# Patient Record
Sex: FEMALE | Race: BLACK OR AFRICAN AMERICAN | NOT HISPANIC OR LATINO | Employment: UNEMPLOYED | URBAN - METROPOLITAN AREA
[De-identification: names, ages, dates, MRNs, and addresses within clinical notes are randomized per-mention and may not be internally consistent; named-entity substitution may affect disease eponyms.]

---

## 2017-03-07 ENCOUNTER — HISTORICAL (OUTPATIENT)
Dept: FAMILY MEDICINE | Facility: CLINIC | Age: 62
End: 2017-03-07

## 2017-03-28 ENCOUNTER — HISTORICAL (OUTPATIENT)
Dept: FAMILY MEDICINE | Facility: CLINIC | Age: 62
End: 2017-03-28

## 2017-07-27 ENCOUNTER — HISTORICAL (OUTPATIENT)
Dept: ADMINISTRATIVE | Facility: HOSPITAL | Age: 62
End: 2017-07-27

## 2017-07-27 LAB
CHOLEST SERPL-MCNC: 150 MG/DL
CHOLEST/HDLC SERPL: 3.5 {RATIO} (ref 0–4.4)
EST. AVERAGE GLUCOSE BLD GHB EST-MCNC: 200 MG/DL
HBA1C MFR BLD: 8.6 % (ref 4.2–6.3)
HDLC SERPL-MCNC: 43 MG/DL
LDLC SERPL CALC-MCNC: 74 MG/DL (ref 0–130)
TRIGL SERPL-MCNC: 165 MG/DL
VLDLC SERPL CALC-MCNC: 33 MG/DL

## 2017-10-20 ENCOUNTER — HISTORICAL (OUTPATIENT)
Dept: INTERNAL MEDICINE | Facility: CLINIC | Age: 62
End: 2017-10-20

## 2017-10-20 LAB
EST. AVERAGE GLUCOSE BLD GHB EST-MCNC: 206 MG/DL
HBA1C MFR BLD: 8.8 % (ref 4.2–6.3)

## 2018-02-05 ENCOUNTER — HISTORICAL (OUTPATIENT)
Dept: ADMINISTRATIVE | Facility: HOSPITAL | Age: 63
End: 2018-02-05

## 2018-02-05 LAB
ABS NEUT (OLG): 6.19 X10(3)/MCL (ref 2.1–9.2)
ALBUMIN SERPL-MCNC: 4.1 GM/DL (ref 3.4–5)
ALBUMIN/GLOB SERPL: 1 RATIO (ref 1–2)
ALP SERPL-CCNC: 96 UNIT/L (ref 45–117)
ALT SERPL-CCNC: 21 UNIT/L (ref 12–78)
AST SERPL-CCNC: 16 UNIT/L (ref 15–37)
BASOPHILS # BLD AUTO: 0.04 X10(3)/MCL
BASOPHILS NFR BLD AUTO: 0 % (ref 0–1)
BILIRUB SERPL-MCNC: 0.3 MG/DL (ref 0.2–1)
BILIRUBIN DIRECT+TOT PNL SERPL-MCNC: <0.1 MG/DL
BILIRUBIN DIRECT+TOT PNL SERPL-MCNC: ABNORMAL MG/DL
BUN SERPL-MCNC: 40 MG/DL (ref 7–18)
CALCIUM SERPL-MCNC: 9.5 MG/DL (ref 8.5–10.1)
CHLORIDE SERPL-SCNC: 109 MMOL/L (ref 98–107)
CO2 SERPL-SCNC: 24 MMOL/L (ref 21–32)
CREAT SERPL-MCNC: 1.8 MG/DL (ref 0.6–1.3)
EOSINOPHIL # BLD AUTO: 0.28 X10(3)/MCL
EOSINOPHIL NFR BLD AUTO: 3 % (ref 0–5)
ERYTHROCYTE [DISTWIDTH] IN BLOOD BY AUTOMATED COUNT: 14 % (ref 11.5–14.5)
EST. AVERAGE GLUCOSE BLD GHB EST-MCNC: 203 MG/DL
GLOBULIN SER-MCNC: 3.7 GM/ML (ref 2.3–3.5)
GLUCOSE SERPL-MCNC: 201 MG/DL (ref 74–106)
HBA1C MFR BLD: 8.7 % (ref 4.2–6.3)
HCT VFR BLD AUTO: 35.8 % (ref 35–46)
HGB BLD-MCNC: 11.2 GM/DL (ref 12–16)
IMM GRANULOCYTES # BLD AUTO: 0.03 10*3/UL
IMM GRANULOCYTES NFR BLD AUTO: 0 %
LYMPHOCYTES # BLD AUTO: 2.63 X10(3)/MCL
LYMPHOCYTES NFR BLD AUTO: 27 % (ref 15–40)
MCH RBC QN AUTO: 26.2 PG (ref 26–34)
MCHC RBC AUTO-ENTMCNC: 31.3 GM/DL (ref 31–37)
MCV RBC AUTO: 83.6 FL (ref 80–100)
MONOCYTES # BLD AUTO: 0.57 X10(3)/MCL
MONOCYTES NFR BLD AUTO: 6 % (ref 4–12)
NEUTROPHILS # BLD AUTO: 6.19 X10(3)/MCL
NEUTROPHILS NFR BLD AUTO: 64 X10(3)/MCL
PLATELET # BLD AUTO: 253 X10(3)/MCL (ref 130–400)
PMV BLD AUTO: 12 FL (ref 7.4–10.4)
POTASSIUM SERPL-SCNC: 5 MMOL/L (ref 3.5–5.1)
PROT SERPL-MCNC: 7.8 GM/DL (ref 6.4–8.2)
RBC # BLD AUTO: 4.28 X10(6)/MCL (ref 4–5.2)
SODIUM SERPL-SCNC: 141 MMOL/L (ref 136–145)
WBC # SPEC AUTO: 9.7 X10(3)/MCL (ref 4.5–11)

## 2018-02-14 ENCOUNTER — HISTORICAL (OUTPATIENT)
Dept: FAMILY MEDICINE | Facility: CLINIC | Age: 63
End: 2018-02-14

## 2018-03-08 ENCOUNTER — HISTORICAL (OUTPATIENT)
Dept: FAMILY MEDICINE | Facility: CLINIC | Age: 63
End: 2018-03-08

## 2018-03-08 LAB
BUN SERPL-MCNC: 32 MG/DL (ref 7–18)
CALCIUM SERPL-MCNC: 9.2 MG/DL (ref 8.5–10.1)
CHLORIDE SERPL-SCNC: 106 MMOL/L (ref 98–107)
CO2 SERPL-SCNC: 27 MMOL/L (ref 21–32)
CREAT SERPL-MCNC: 1.3 MG/DL (ref 0.6–1.3)
CREAT/UREA NIT SERPL: 25
GLUCOSE SERPL-MCNC: 118 MG/DL (ref 74–106)
POTASSIUM SERPL-SCNC: 4 MMOL/L (ref 3.5–5.1)
SODIUM SERPL-SCNC: 141 MMOL/L (ref 136–145)

## 2018-03-29 ENCOUNTER — HISTORICAL (OUTPATIENT)
Dept: RADIOLOGY | Facility: HOSPITAL | Age: 63
End: 2018-03-29

## 2018-04-17 ENCOUNTER — HISTORICAL (OUTPATIENT)
Dept: FAMILY MEDICINE | Facility: CLINIC | Age: 63
End: 2018-04-17

## 2018-05-01 ENCOUNTER — HISTORICAL (OUTPATIENT)
Dept: ADMINISTRATIVE | Facility: HOSPITAL | Age: 63
End: 2018-05-01

## 2018-05-01 LAB
BUN SERPL-MCNC: 30 MG/DL (ref 7–18)
CALCIUM SERPL-MCNC: 8.8 MG/DL (ref 8.5–10.1)
CHLORIDE SERPL-SCNC: 110 MMOL/L (ref 98–107)
CO2 SERPL-SCNC: 26 MMOL/L (ref 21–32)
CREAT SERPL-MCNC: 1.6 MG/DL (ref 0.6–1.3)
CREAT/UREA NIT SERPL: 19
EST. AVERAGE GLUCOSE BLD GHB EST-MCNC: 214 MG/DL
GLUCOSE SERPL-MCNC: 154 MG/DL (ref 74–106)
HBA1C MFR BLD: 9.1 % (ref 4.2–6.3)
POTASSIUM SERPL-SCNC: 4 MMOL/L (ref 3.5–5.1)
SODIUM SERPL-SCNC: 137 MMOL/L (ref 136–145)

## 2018-07-03 ENCOUNTER — HISTORICAL (OUTPATIENT)
Dept: ADMINISTRATIVE | Facility: HOSPITAL | Age: 63
End: 2018-07-03

## 2018-07-03 LAB
BUN SERPL-MCNC: 33 MG/DL (ref 7–18)
CALCIUM SERPL-MCNC: 9.8 MG/DL (ref 8.5–10.1)
CHLORIDE SERPL-SCNC: 105 MMOL/L (ref 98–107)
CHOLEST SERPL-MCNC: 170 MG/DL
CHOLEST/HDLC SERPL: 4.9 {RATIO} (ref 0–4.4)
CO2 SERPL-SCNC: 27 MMOL/L (ref 21–32)
CREAT SERPL-MCNC: 1.6 MG/DL (ref 0.6–1.3)
CREAT/UREA NIT SERPL: 21
GLUCOSE SERPL-MCNC: 254 MG/DL (ref 74–106)
HDLC SERPL-MCNC: 35 MG/DL
LDLC SERPL CALC-MCNC: 82 MG/DL (ref 0–130)
POTASSIUM SERPL-SCNC: 5.8 MMOL/L (ref 3.5–5.1)
SODIUM SERPL-SCNC: 136 MMOL/L (ref 136–145)
TRIGL SERPL-MCNC: 267 MG/DL
VLDLC SERPL CALC-MCNC: 53 MG/DL

## 2018-09-13 ENCOUNTER — HISTORICAL (OUTPATIENT)
Dept: ADMINISTRATIVE | Facility: HOSPITAL | Age: 63
End: 2018-09-13

## 2018-09-13 LAB
ABS NEUT (OLG): 4.99 X10(3)/MCL (ref 2.1–9.2)
ALBUMIN SERPL-MCNC: 3.9 GM/DL (ref 3.4–5)
ALBUMIN/GLOB SERPL: 1 RATIO (ref 1–2)
ALP SERPL-CCNC: 118 UNIT/L (ref 45–117)
ALT SERPL-CCNC: 23 UNIT/L (ref 12–78)
APPEARANCE, UA: CLEAR
AST SERPL-CCNC: 33 UNIT/L (ref 15–37)
BACTERIA #/AREA URNS AUTO: ABNORMAL /[HPF]
BASOPHILS # BLD AUTO: 0.03 X10(3)/MCL
BASOPHILS NFR BLD AUTO: 0 %
BILIRUB SERPL-MCNC: 0.2 MG/DL (ref 0.2–1)
BILIRUB UR QL STRIP: NEGATIVE
BILIRUBIN DIRECT+TOT PNL SERPL-MCNC: <0.1 MG/DL
BILIRUBIN DIRECT+TOT PNL SERPL-MCNC: ABNORMAL MG/DL
BUN SERPL-MCNC: 42 MG/DL (ref 7–18)
CALCIUM SERPL-MCNC: 9.4 MG/DL (ref 8.5–10.1)
CHLORIDE SERPL-SCNC: 106 MMOL/L (ref 98–107)
CO2 SERPL-SCNC: 28 MMOL/L (ref 21–32)
COLOR UR: NORMAL
CREAT SERPL-MCNC: 1.7 MG/DL (ref 0.6–1.3)
EOSINOPHIL # BLD AUTO: 0.23 X10(3)/MCL
EOSINOPHIL NFR BLD AUTO: 3 %
ERYTHROCYTE [DISTWIDTH] IN BLOOD BY AUTOMATED COUNT: 13.3 % (ref 11.5–14.5)
EST. AVERAGE GLUCOSE BLD GHB EST-MCNC: 203.85 MG/DL
GLOBULIN SER-MCNC: 4 GM/ML (ref 2.3–3.5)
GLUCOSE (UA): NORMAL
GLUCOSE SERPL-MCNC: 135 MG/DL (ref 74–106)
HBA1C MFR BLD: 8.73 % (ref 4.2–6.3)
HCT VFR BLD AUTO: 37.4 % (ref 35–46)
HGB BLD-MCNC: 11.4 GM/DL (ref 12–16)
HGB UR QL STRIP: NEGATIVE
HYALINE CASTS #/AREA URNS LPF: ABNORMAL /[LPF]
IMM GRANULOCYTES # BLD AUTO: 0.02 10*3/UL
IMM GRANULOCYTES NFR BLD AUTO: 0 %
KETONES UR QL STRIP: NEGATIVE
LEUKOCYTE ESTERASE UR QL STRIP: NEGATIVE
LYMPHOCYTES # BLD AUTO: 2.56 X10(3)/MCL
LYMPHOCYTES NFR BLD AUTO: 31 % (ref 13–40)
MCH RBC QN AUTO: 26 PG (ref 26–34)
MCHC RBC AUTO-ENTMCNC: 30.5 GM/DL (ref 31–37)
MCV RBC AUTO: 85.2 FL (ref 80–100)
MONOCYTES # BLD AUTO: 0.53 X10(3)/MCL
MONOCYTES NFR BLD AUTO: 6 % (ref 4–12)
NEUTROPHILS # BLD AUTO: 4.99 X10(3)/MCL
NEUTROPHILS NFR BLD AUTO: 60 X10(3)/MCL
NITRITE UR QL STRIP: NEGATIVE
PH UR STRIP: 5.5 [PH] (ref 4.5–8)
PLATELET # BLD AUTO: 229 X10(3)/MCL (ref 130–400)
PMV BLD AUTO: 13.2 FL (ref 7.4–10.4)
POTASSIUM SERPL-SCNC: 4.6 MMOL/L (ref 3.5–5.1)
PROT SERPL-MCNC: 7.9 GM/DL (ref 6.4–8.2)
PROT UR QL STRIP: NEGATIVE
RBC # BLD AUTO: 4.39 X10(6)/MCL (ref 4–5.2)
RBC #/AREA URNS AUTO: ABNORMAL /[HPF]
SODIUM SERPL-SCNC: 141 MMOL/L (ref 136–145)
SP GR UR STRIP: 1.01 (ref 1–1.03)
SQUAMOUS #/AREA URNS LPF: ABNORMAL /[LPF]
UROBILINOGEN UR STRIP-ACNC: NORMAL
WBC # SPEC AUTO: 8.4 X10(3)/MCL (ref 4.5–11)
WBC #/AREA URNS AUTO: ABNORMAL /HPF

## 2018-10-30 ENCOUNTER — HISTORICAL (OUTPATIENT)
Dept: ADMINISTRATIVE | Facility: HOSPITAL | Age: 63
End: 2018-10-30

## 2018-10-30 LAB
ABS NEUT (OLG): 4.93 X10(3)/MCL (ref 2.1–9.2)
ALBUMIN SERPL-MCNC: 3.4 GM/DL (ref 3.4–5)
ALBUMIN/GLOB SERPL: 1 RATIO (ref 1–2)
ALP SERPL-CCNC: 109 UNIT/L (ref 45–117)
ALT SERPL-CCNC: 21 UNIT/L (ref 12–78)
APPEARANCE, UA: CLEAR
AST SERPL-CCNC: 36 UNIT/L (ref 15–37)
BACTERIA #/AREA URNS AUTO: ABNORMAL /[HPF]
BASOPHILS # BLD AUTO: 0.02 X10(3)/MCL
BASOPHILS NFR BLD AUTO: 0 %
BILIRUB SERPL-MCNC: 0.2 MG/DL (ref 0.2–1)
BILIRUB UR QL STRIP: NEGATIVE
BILIRUBIN DIRECT+TOT PNL SERPL-MCNC: 0.1 MG/DL
BILIRUBIN DIRECT+TOT PNL SERPL-MCNC: 0.1 MG/DL
BUN SERPL-MCNC: 25 MG/DL (ref 7–18)
CALCIUM SERPL-MCNC: 9.2 MG/DL (ref 8.5–10.1)
CHLORIDE SERPL-SCNC: 107 MMOL/L (ref 98–107)
CO2 SERPL-SCNC: 29 MMOL/L (ref 21–32)
COLOR UR: YELLOW
CREAT SERPL-MCNC: 1.4 MG/DL (ref 0.6–1.3)
CREAT UR-MCNC: 98 MG/DL
DEPRECATED CALCIDIOL+CALCIFEROL SERPL-MC: 38.25 NG/ML (ref 30–80)
EOSINOPHIL # BLD AUTO: 0.32 10*3/UL
EOSINOPHIL NFR BLD AUTO: 4 %
ERYTHROCYTE [DISTWIDTH] IN BLOOD BY AUTOMATED COUNT: 13.3 % (ref 11.5–14.5)
FERRITIN SERPL-MCNC: 87.7 NG/ML (ref 8–388)
GLOBULIN SER-MCNC: 3.8 GM/ML (ref 2.3–3.5)
GLUCOSE (UA): NORMAL
GLUCOSE SERPL-MCNC: 183 MG/DL (ref 74–106)
HCT VFR BLD AUTO: 34.3 % (ref 35–46)
HGB BLD-MCNC: 10.4 GM/DL (ref 12–16)
HGB UR QL STRIP: NEGATIVE
HYALINE CASTS #/AREA URNS LPF: ABNORMAL /[LPF]
IMM GRANULOCYTES # BLD AUTO: 0.02 10*3/UL
IMM GRANULOCYTES NFR BLD AUTO: 0 %
IRON SATN MFR SERPL: 19.7 % (ref 15–50)
IRON SERPL-MCNC: 45 MCG/DL (ref 50–170)
KETONES UR QL STRIP: NEGATIVE
LEUKOCYTE ESTERASE UR QL STRIP: NEGATIVE
LYMPHOCYTES # BLD AUTO: 2.31 X10(3)/MCL
LYMPHOCYTES NFR BLD AUTO: 28 % (ref 13–40)
MAGNESIUM SERPL-MCNC: 1.8 MG/DL (ref 1.8–2.4)
MCH RBC QN AUTO: 25.7 PG (ref 26–34)
MCHC RBC AUTO-ENTMCNC: 30.3 GM/DL (ref 31–37)
MCV RBC AUTO: 84.9 FL (ref 80–100)
MONOCYTES # BLD AUTO: 0.61 X10(3)/MCL
MONOCYTES NFR BLD AUTO: 7 % (ref 4–12)
NEUTROPHILS # BLD AUTO: 4.93 X10(3)/MCL
NEUTROPHILS NFR BLD AUTO: 60 X10(3)/MCL
NITRITE UR QL STRIP: NEGATIVE
PH UR STRIP: 6 [PH] (ref 4.5–8)
PHOSPHATE SERPL-MCNC: 2.3 MG/DL (ref 2.5–4.9)
PLATELET # BLD AUTO: 228 X10(3)/MCL (ref 130–400)
PMV BLD AUTO: 12.6 FL (ref 7.4–10.4)
POTASSIUM SERPL-SCNC: 4.1 MMOL/L (ref 3.5–5.1)
PROT SERPL-MCNC: 7.2 GM/DL (ref 6.4–8.2)
PROT UR QL STRIP: NEGATIVE
PROT UR STRIP-MCNC: 5.1 MG/DL
PROT/CREAT UR-RTO: 52 MG/GM
PTH-INTACT SERPL-MCNC: 151.8 PG/ML (ref 18.4–80.1)
RBC # BLD AUTO: 4.04 X10(6)/MCL (ref 4–5.2)
RBC #/AREA URNS AUTO: ABNORMAL /[HPF]
SODIUM SERPL-SCNC: 140 MMOL/L (ref 136–145)
SP GR UR STRIP: 1.01 (ref 1–1.03)
SQUAMOUS #/AREA URNS LPF: ABNORMAL /[LPF]
TIBC SERPL-MCNC: 229 MCG/DL (ref 250–450)
TRANSFERRIN SERPL-MCNC: 186 MG/DL (ref 200–360)
UROBILINOGEN UR STRIP-ACNC: NORMAL
WBC # SPEC AUTO: 8.2 X10(3)/MCL (ref 4.5–11)
WBC #/AREA URNS AUTO: ABNORMAL /HPF

## 2019-02-13 ENCOUNTER — HISTORICAL (OUTPATIENT)
Dept: ADMINISTRATIVE | Facility: HOSPITAL | Age: 64
End: 2019-02-13

## 2019-02-13 LAB
EST. AVERAGE GLUCOSE BLD GHB EST-MCNC: 255 MG/DL
HBA1C MFR BLD: 10.5 % (ref 4.2–6.3)

## 2019-02-22 ENCOUNTER — HISTORICAL (OUTPATIENT)
Dept: NEPHROLOGY | Facility: CLINIC | Age: 64
End: 2019-02-22

## 2019-02-22 LAB
ABS NEUT (OLG): 3.65 X10(3)/MCL (ref 2.1–9.2)
ALBUMIN SERPL-MCNC: 3.7 GM/DL (ref 3.4–5)
ALBUMIN/GLOB SERPL: 1 RATIO (ref 1.1–2)
ALP SERPL-CCNC: 85 UNIT/L (ref 45–117)
ALT SERPL-CCNC: 20 UNIT/L (ref 12–78)
APPEARANCE, UA: CLEAR
AST SERPL-CCNC: 20 UNIT/L (ref 15–37)
BACTERIA #/AREA URNS AUTO: ABNORMAL /[HPF]
BASOPHILS # BLD AUTO: 0.03 X10(3)/MCL
BASOPHILS NFR BLD AUTO: 0 %
BILIRUB SERPL-MCNC: 0.3 MG/DL (ref 0.2–1)
BILIRUB UR QL STRIP: NEGATIVE
BILIRUBIN DIRECT+TOT PNL SERPL-MCNC: <0.1 MG/DL
BILIRUBIN DIRECT+TOT PNL SERPL-MCNC: >0.2 MG/DL
BUN SERPL-MCNC: 43 MG/DL (ref 7–18)
CALCIUM SERPL-MCNC: 9.4 MG/DL (ref 8.5–10.1)
CHLORIDE SERPL-SCNC: 109 MMOL/L (ref 98–107)
CO2 SERPL-SCNC: 28 MMOL/L (ref 21–32)
COLOR UR: NORMAL
CREAT SERPL-MCNC: 1.6 MG/DL (ref 0.6–1.3)
CREAT UR-MCNC: 122 MG/DL
DEPRECATED CALCIDIOL+CALCIFEROL SERPL-MC: 42.52 NG/ML (ref 30–80)
EOSINOPHIL # BLD AUTO: 0.23 10*3/UL
EOSINOPHIL NFR BLD AUTO: 3 %
ERYTHROCYTE [DISTWIDTH] IN BLOOD BY AUTOMATED COUNT: 13.6 % (ref 11.5–14.5)
FERRITIN SERPL-MCNC: 202.7 NG/ML (ref 8–388)
GLOBULIN SER-MCNC: 3.8 GM/ML (ref 2.3–3.5)
GLUCOSE (UA): NORMAL
GLUCOSE SERPL-MCNC: 154 MG/DL (ref 74–106)
HCT VFR BLD AUTO: 35.3 % (ref 35–46)
HGB BLD-MCNC: 10.8 GM/DL (ref 12–16)
HGB UR QL STRIP: NEGATIVE
HYALINE CASTS #/AREA URNS LPF: ABNORMAL /[LPF]
IMM GRANULOCYTES # BLD AUTO: 0.01 10*3/UL
IMM GRANULOCYTES NFR BLD AUTO: 0 %
IRON SATN MFR SERPL: 28.3 % (ref 15–50)
IRON SERPL-MCNC: 65 MCG/DL (ref 50–170)
KETONES UR QL STRIP: NEGATIVE
LEUKOCYTE ESTERASE UR QL STRIP: NEGATIVE
LYMPHOCYTES # BLD AUTO: 2.36 X10(3)/MCL
LYMPHOCYTES NFR BLD AUTO: 34 % (ref 13–40)
MAGNESIUM SERPL-MCNC: 2 MG/DL (ref 1.6–2.6)
MCH RBC QN AUTO: 25.8 PG (ref 26–34)
MCHC RBC AUTO-ENTMCNC: 30.6 GM/DL (ref 31–37)
MCV RBC AUTO: 84.2 FL (ref 80–100)
MONOCYTES # BLD AUTO: 0.56 X10(3)/MCL
MONOCYTES NFR BLD AUTO: 8 % (ref 4–12)
NEUTROPHILS # BLD AUTO: 3.65 X10(3)/MCL
NEUTROPHILS NFR BLD AUTO: 53 X10(3)/MCL
NITRITE UR QL STRIP: NEGATIVE
PH UR STRIP: 7 [PH] (ref 4.5–8)
PHOSPHATE SERPL-MCNC: 3 MG/DL (ref 2.5–4.9)
PLATELET # BLD AUTO: 222 X10(3)/MCL (ref 130–400)
PMV BLD AUTO: 11.4 FL (ref 7.4–10.4)
POTASSIUM SERPL-SCNC: 4.2 MMOL/L (ref 3.5–5.1)
PROT SERPL-MCNC: 7.5 GM/DL (ref 6.4–8.2)
PROT UR QL STRIP: NEGATIVE
PROT UR STRIP-MCNC: 9.1 MG/DL
PROT/CREAT UR-RTO: 74.6 MG/GM
PTH-INTACT SERPL-MCNC: 159 PG/ML (ref 18.4–80.1)
RBC # BLD AUTO: 4.19 X10(6)/MCL (ref 4–5.2)
RBC #/AREA URNS AUTO: ABNORMAL /[HPF]
SODIUM SERPL-SCNC: 142 MMOL/L (ref 136–145)
SP GR UR STRIP: 1.01 (ref 1–1.03)
SQUAMOUS #/AREA URNS LPF: ABNORMAL /[LPF]
TIBC SERPL-MCNC: 230 MCG/DL (ref 250–450)
TRANSFERRIN SERPL-MCNC: 184 MG/DL (ref 200–360)
UROBILINOGEN UR STRIP-ACNC: NORMAL
WBC # SPEC AUTO: 6.8 X10(3)/MCL (ref 4.5–11)
WBC #/AREA URNS AUTO: ABNORMAL /HPF

## 2019-05-29 ENCOUNTER — HISTORICAL (OUTPATIENT)
Dept: ADMINISTRATIVE | Facility: HOSPITAL | Age: 64
End: 2019-05-29

## 2019-05-29 LAB
EST. AVERAGE GLUCOSE BLD GHB EST-MCNC: 194 MG/DL
HBA1C MFR BLD: 8.4 % (ref 4.2–6.3)

## 2019-06-13 ENCOUNTER — HISTORICAL (OUTPATIENT)
Dept: ADMINISTRATIVE | Facility: HOSPITAL | Age: 64
End: 2019-06-13

## 2019-06-28 ENCOUNTER — HISTORICAL (OUTPATIENT)
Dept: RADIOLOGY | Facility: HOSPITAL | Age: 64
End: 2019-06-28

## 2019-09-17 ENCOUNTER — HISTORICAL (OUTPATIENT)
Dept: ADMINISTRATIVE | Facility: HOSPITAL | Age: 64
End: 2019-09-17

## 2019-09-23 ENCOUNTER — HISTORICAL (OUTPATIENT)
Dept: FAMILY MEDICINE | Facility: CLINIC | Age: 64
End: 2019-09-23

## 2019-09-23 LAB
ABS NEUT (OLG): 5.01 X10(3)/MCL (ref 2.1–9.2)
ALBUMIN SERPL-MCNC: 3.6 GM/DL (ref 3.4–5)
ALBUMIN/GLOB SERPL: 1 RATIO (ref 1.1–2)
ALP SERPL-CCNC: 79 UNIT/L (ref 45–117)
ALT SERPL-CCNC: 20 UNIT/L (ref 12–78)
APPEARANCE, UA: CLEAR
AST SERPL-CCNC: 19 UNIT/L (ref 15–37)
BACTERIA #/AREA URNS AUTO: ABNORMAL /[HPF]
BASOPHILS # BLD AUTO: 0 X10(3)/MCL (ref 0–0.2)
BASOPHILS NFR BLD AUTO: 0 %
BILIRUB SERPL-MCNC: 0.3 MG/DL (ref 0.2–1)
BILIRUB UR QL STRIP: NEGATIVE
BILIRUBIN DIRECT+TOT PNL SERPL-MCNC: <0.1 MG/DL (ref 0–0.2)
BILIRUBIN DIRECT+TOT PNL SERPL-MCNC: ABNORMAL MG/DL
BUN SERPL-MCNC: 39 MG/DL (ref 7–18)
CALCIUM SERPL-MCNC: 9.4 MG/DL (ref 8.5–10.1)
CHLORIDE SERPL-SCNC: 110 MMOL/L (ref 98–107)
CO2 SERPL-SCNC: 31 MMOL/L (ref 21–32)
COLOR UR: NORMAL
CREAT SERPL-MCNC: 1.9 MG/DL (ref 0.6–1.3)
CREAT UR-MCNC: 113 MG/DL
DEPRECATED CALCIDIOL+CALCIFEROL SERPL-MC: 41.91 NG/ML (ref 30–80)
EOSINOPHIL # BLD AUTO: 0.4 X10(3)/MCL (ref 0–0.9)
EOSINOPHIL NFR BLD AUTO: 5 %
ERYTHROCYTE [DISTWIDTH] IN BLOOD BY AUTOMATED COUNT: 12.9 % (ref 11.5–14.5)
GLOBULIN SER-MCNC: 3.5 GM/ML (ref 2.3–3.5)
GLUCOSE (UA): NORMAL
GLUCOSE SERPL-MCNC: 131 MG/DL (ref 74–106)
HCT VFR BLD AUTO: 36.3 % (ref 35–46)
HGB BLD-MCNC: 10.9 GM/DL (ref 12–16)
HGB UR QL STRIP: NEGATIVE
HYALINE CASTS #/AREA URNS LPF: ABNORMAL /[LPF]
IMM GRANULOCYTES # BLD AUTO: 0.03 10*3/UL
IMM GRANULOCYTES NFR BLD AUTO: 0 %
KETONES UR QL STRIP: NEGATIVE
LEUKOCYTE ESTERASE UR QL STRIP: NEGATIVE
LYMPHOCYTES # BLD AUTO: 2.3 X10(3)/MCL (ref 0.6–4.6)
LYMPHOCYTES NFR BLD AUTO: 27 %
MAGNESIUM SERPL-MCNC: 1.9 MG/DL (ref 1.6–2.6)
MCH RBC QN AUTO: 26.7 PG (ref 26–34)
MCHC RBC AUTO-ENTMCNC: 30 GM/DL (ref 31–37)
MCV RBC AUTO: 88.8 FL (ref 80–100)
MONOCYTES # BLD AUTO: 0.6 X10(3)/MCL (ref 0.1–1.3)
MONOCYTES NFR BLD AUTO: 8 %
NEUTROPHILS # BLD AUTO: 5.01 X10(3)/MCL (ref 2.1–9.2)
NEUTROPHILS NFR BLD AUTO: 60 %
NITRITE UR QL STRIP: NEGATIVE
PH UR STRIP: 7.5 [PH] (ref 4.5–8)
PHOSPHATE SERPL-MCNC: 2.6 MG/DL (ref 2.5–4.9)
PLATELET # BLD AUTO: 243 X10(3)/MCL (ref 130–400)
PMV BLD AUTO: 12 FL (ref 7.4–10.4)
POTASSIUM SERPL-SCNC: 5 MMOL/L (ref 3.5–5.1)
PROT SERPL-MCNC: 7.1 GM/DL (ref 6.4–8.2)
PROT UR QL STRIP: NEGATIVE
PROT UR STRIP-MCNC: 11.6 MG/DL
PROT/CREAT UR-RTO: 102.7 MG/GM
PTH-INTACT SERPL-MCNC: 153.8 PG/ML (ref 18.4–80.1)
RBC # BLD AUTO: 4.09 X10(6)/MCL (ref 4–5.2)
RBC #/AREA URNS AUTO: ABNORMAL /[HPF]
SODIUM SERPL-SCNC: 144 MMOL/L (ref 136–145)
SP GR UR STRIP: 1.01 (ref 1–1.03)
SQUAMOUS #/AREA URNS LPF: ABNORMAL /[LPF]
UROBILINOGEN UR STRIP-ACNC: NORMAL
WBC # SPEC AUTO: 8.4 X10(3)/MCL (ref 4.5–11)
WBC #/AREA URNS AUTO: ABNORMAL /HPF

## 2019-09-26 ENCOUNTER — HISTORICAL (OUTPATIENT)
Dept: ADMINISTRATIVE | Facility: HOSPITAL | Age: 64
End: 2019-09-26

## 2019-09-30 ENCOUNTER — HISTORICAL (OUTPATIENT)
Dept: ADMINISTRATIVE | Facility: HOSPITAL | Age: 64
End: 2019-09-30

## 2019-09-30 LAB — URATE SERPL-MCNC: 11.3 MG/DL (ref 2.6–6)

## 2020-03-02 ENCOUNTER — HISTORICAL (OUTPATIENT)
Dept: ADMINISTRATIVE | Facility: HOSPITAL | Age: 65
End: 2020-03-02

## 2020-03-02 LAB
EST. AVERAGE GLUCOSE BLD GHB EST-MCNC: 151 MG/DL
HBA1C MFR BLD: 6.9 % (ref 4.2–6.3)

## 2020-04-08 ENCOUNTER — HISTORICAL (OUTPATIENT)
Dept: ADMINISTRATIVE | Facility: HOSPITAL | Age: 65
End: 2020-04-08

## 2020-04-08 LAB
BUN SERPL-MCNC: 49 MG/DL (ref 7–18)
CALCIUM SERPL-MCNC: 9.8 MG/DL (ref 8.5–10.1)
CHLORIDE SERPL-SCNC: 108 MMOL/L (ref 98–107)
CO2 SERPL-SCNC: 26 MMOL/L (ref 21–32)
CREAT SERPL-MCNC: 2.1 MG/DL (ref 0.6–1.3)
CREAT/UREA NIT SERPL: 23
GLUCOSE SERPL-MCNC: 196 MG/DL (ref 74–106)
POTASSIUM SERPL-SCNC: 5.3 MMOL/L (ref 3.5–5.1)
SODIUM SERPL-SCNC: 138 MMOL/L (ref 136–145)
URATE SERPL-MCNC: 9.7 MG/DL (ref 2.6–6)

## 2020-06-22 ENCOUNTER — HISTORICAL (OUTPATIENT)
Dept: ADMINISTRATIVE | Facility: HOSPITAL | Age: 65
End: 2020-06-22

## 2020-06-22 LAB
ABS NEUT (OLG): 5.61 X10(3)/MCL (ref 2.1–9.2)
BASOPHILS # BLD AUTO: 0 X10(3)/MCL (ref 0–0.2)
BASOPHILS NFR BLD AUTO: 0 %
BUN SERPL-MCNC: 51 MG/DL (ref 7–18)
CALCIUM SERPL-MCNC: 9.6 MG/DL (ref 8.5–10.1)
CHLORIDE SERPL-SCNC: 110 MMOL/L (ref 98–107)
CO2 SERPL-SCNC: 24 MMOL/L (ref 21–32)
CREAT SERPL-MCNC: 1.9 MG/DL (ref 0.6–1.3)
CREAT/UREA NIT SERPL: 27
EOSINOPHIL # BLD AUTO: 0.4 X10(3)/MCL (ref 0–0.9)
EOSINOPHIL NFR BLD AUTO: 4 %
ERYTHROCYTE [DISTWIDTH] IN BLOOD BY AUTOMATED COUNT: 14.1 % (ref 11.5–14.5)
EST. AVERAGE GLUCOSE BLD GHB EST-MCNC: 237 MG/DL
GLUCOSE SERPL-MCNC: 251 MG/DL (ref 74–106)
HBA1C MFR BLD: 9.9 % (ref 4.2–6.3)
HCT VFR BLD AUTO: 36.3 % (ref 35–46)
HGB BLD-MCNC: 11 GM/DL (ref 12–16)
IMM GRANULOCYTES # BLD AUTO: 0.03 10*3/UL
IMM GRANULOCYTES NFR BLD AUTO: 0 %
LYMPHOCYTES # BLD AUTO: 2.1 X10(3)/MCL (ref 0.6–4.6)
LYMPHOCYTES NFR BLD AUTO: 24 %
MCH RBC QN AUTO: 25.8 PG (ref 26–34)
MCHC RBC AUTO-ENTMCNC: 30.3 GM/DL (ref 31–37)
MCV RBC AUTO: 85 FL (ref 80–100)
MONOCYTES # BLD AUTO: 0.7 X10(3)/MCL (ref 0.1–1.3)
MONOCYTES NFR BLD AUTO: 8 %
NEUTROPHILS # BLD AUTO: 5.61 X10(3)/MCL (ref 2.1–9.2)
NEUTROPHILS NFR BLD AUTO: 64 %
PLATELET # BLD AUTO: 239 X10(3)/MCL (ref 130–400)
PMV BLD AUTO: 12.7 FL (ref 7.4–10.4)
POTASSIUM SERPL-SCNC: 5.5 MMOL/L (ref 3.5–5.1)
RBC # BLD AUTO: 4.27 X10(6)/MCL (ref 4–5.2)
SODIUM SERPL-SCNC: 139 MMOL/L (ref 136–145)
URATE SERPL-MCNC: 9.4 MG/DL (ref 2.6–6)
WBC # SPEC AUTO: 8.8 X10(3)/MCL (ref 4.5–11)

## 2020-07-06 ENCOUNTER — HISTORICAL (OUTPATIENT)
Dept: ADMINISTRATIVE | Facility: HOSPITAL | Age: 65
End: 2020-07-06

## 2020-07-06 LAB
BUN SERPL-MCNC: 53 MG/DL (ref 7–18)
CALCIUM SERPL-MCNC: 9.9 MG/DL (ref 8.5–10.1)
CHLORIDE SERPL-SCNC: 113 MMOL/L (ref 98–107)
CO2 SERPL-SCNC: 24 MMOL/L (ref 21–32)
CREAT SERPL-MCNC: 1.7 MG/DL (ref 0.6–1.3)
CREAT/UREA NIT SERPL: 31
GLUCOSE SERPL-MCNC: 119 MG/DL (ref 74–106)
POTASSIUM SERPL-SCNC: 4.4 MMOL/L (ref 3.5–5.1)
SODIUM SERPL-SCNC: 142 MMOL/L (ref 136–145)

## 2020-07-21 ENCOUNTER — HISTORICAL (OUTPATIENT)
Dept: FAMILY MEDICINE | Facility: CLINIC | Age: 65
End: 2020-07-21

## 2020-07-21 LAB
BUN SERPL-MCNC: 29 MG/DL (ref 7–18)
CALCIUM SERPL-MCNC: 9.6 MG/DL (ref 8.5–10.1)
CHLORIDE SERPL-SCNC: 112 MMOL/L (ref 98–107)
CO2 SERPL-SCNC: 26 MMOL/L (ref 21–32)
CREAT SERPL-MCNC: 1.5 MG/DL (ref 0.6–1.3)
CREAT UR-MCNC: 71 MG/DL
CREAT/UREA NIT SERPL: 19
GLUCOSE SERPL-MCNC: 186 MG/DL (ref 74–106)
MICROALBUMIN UR-MCNC: 7.7 MG/L (ref 0–19)
MICROALBUMIN/CREAT RATIO PNL UR: 10.8 MCG/MG CR (ref 0–29)
POTASSIUM SERPL-SCNC: 4.3 MMOL/L (ref 3.5–5.1)
SODIUM SERPL-SCNC: 143 MMOL/L (ref 136–145)

## 2020-08-03 ENCOUNTER — HISTORICAL (OUTPATIENT)
Dept: ADMINISTRATIVE | Facility: HOSPITAL | Age: 65
End: 2020-08-03

## 2020-08-03 LAB
CHOLEST SERPL-MCNC: 174 MG/DL
CHOLEST/HDLC SERPL: 4.2 {RATIO} (ref 0–4.4)
HDLC SERPL-MCNC: 41 MG/DL (ref 40–59)
LDLC SERPL CALC-MCNC: 93 MG/DL
TRIGL SERPL-MCNC: 202 MG/DL
VLDLC SERPL CALC-MCNC: 40 MG/DL

## 2020-08-05 ENCOUNTER — HISTORICAL (OUTPATIENT)
Dept: RADIOLOGY | Facility: HOSPITAL | Age: 65
End: 2020-08-05

## 2020-12-01 ENCOUNTER — HISTORICAL (OUTPATIENT)
Dept: FAMILY MEDICINE | Facility: CLINIC | Age: 65
End: 2020-12-01

## 2020-12-01 LAB
EST. AVERAGE GLUCOSE BLD GHB EST-MCNC: 185.8 MG/DL
HBA1C MFR BLD: 8.1 %

## 2020-12-06 LAB
LEFT EYE DM RETINOPATHY: NEGATIVE
RIGHT EYE DM RETINOPATHY: NEGATIVE

## 2021-04-23 ENCOUNTER — HISTORICAL (OUTPATIENT)
Dept: ADMINISTRATIVE | Facility: HOSPITAL | Age: 66
End: 2021-04-23

## 2021-04-23 LAB
ABS NEUT (OLG): 5.68 X10(3)/MCL (ref 2.1–9.2)
ALBUMIN SERPL-MCNC: 4.1 GM/DL (ref 3.4–4.8)
ALBUMIN/GLOB SERPL: 1.1 RATIO (ref 1.1–2)
ALP SERPL-CCNC: 123 UNIT/L (ref 40–150)
ALT SERPL-CCNC: 21 UNIT/L (ref 0–55)
AST SERPL-CCNC: 18 UNIT/L (ref 5–34)
BASOPHILS # BLD AUTO: 0 X10(3)/MCL (ref 0–0.2)
BASOPHILS NFR BLD AUTO: 0 %
BILIRUB SERPL-MCNC: 0.3 MG/DL
BILIRUBIN DIRECT+TOT PNL SERPL-MCNC: 0.1 MG/DL (ref 0–0.5)
BILIRUBIN DIRECT+TOT PNL SERPL-MCNC: 0.2 MG/DL (ref 0–0.8)
BUN SERPL-MCNC: 23.8 MG/DL (ref 9.8–20.1)
CALCIUM SERPL-MCNC: 9.8 MG/DL (ref 8.4–10.2)
CHLORIDE SERPL-SCNC: 104 MMOL/L (ref 98–107)
CHOLEST SERPL-MCNC: 163 MG/DL
CHOLEST/HDLC SERPL: 5 {RATIO} (ref 0–5)
CO2 SERPL-SCNC: 26 MMOL/L (ref 23–31)
CREAT SERPL-MCNC: 1.67 MG/DL (ref 0.55–1.02)
DEPRECATED CALCIDIOL+CALCIFEROL SERPL-MC: 49.2 NG/ML (ref 30–80)
EOSINOPHIL # BLD AUTO: 0.4 X10(3)/MCL (ref 0–0.9)
EOSINOPHIL NFR BLD AUTO: 5 %
ERYTHROCYTE [DISTWIDTH] IN BLOOD BY AUTOMATED COUNT: 14 % (ref 11.5–14.5)
EST. AVERAGE GLUCOSE BLD GHB EST-MCNC: 200.1 MG/DL
GLOBULIN SER-MCNC: 3.6 GM/DL (ref 2.4–3.5)
GLUCOSE SERPL-MCNC: 255 MG/DL (ref 82–115)
HBA1C MFR BLD: 8.6 %
HCT VFR BLD AUTO: 36.7 % (ref 35–46)
HDLC SERPL-MCNC: 35 MG/DL (ref 35–60)
HGB BLD-MCNC: 11.3 GM/DL (ref 12–16)
IMM GRANULOCYTES # BLD AUTO: 0.03 10*3/UL
IMM GRANULOCYTES NFR BLD AUTO: 0 %
LDLC SERPL CALC-MCNC: 85 MG/DL (ref 50–140)
LYMPHOCYTES # BLD AUTO: 2.1 X10(3)/MCL (ref 0.6–4.6)
LYMPHOCYTES NFR BLD AUTO: 24 %
MCH RBC QN AUTO: 25.5 PG (ref 26–34)
MCHC RBC AUTO-ENTMCNC: 30.8 GM/DL (ref 31–37)
MCV RBC AUTO: 82.8 FL (ref 80–100)
MONOCYTES # BLD AUTO: 0.5 X10(3)/MCL (ref 0.1–1.3)
MONOCYTES NFR BLD AUTO: 6 %
NEUTROPHILS # BLD AUTO: 5.68 X10(3)/MCL (ref 2.1–9.2)
NEUTROPHILS NFR BLD AUTO: 65 %
PLATELET # BLD AUTO: 243 X10(3)/MCL (ref 130–400)
PMV BLD AUTO: 12.1 FL (ref 7.4–10.4)
POTASSIUM SERPL-SCNC: 3.9 MMOL/L (ref 3.5–5.1)
PROT SERPL-MCNC: 7.7 GM/DL (ref 5.8–7.6)
RBC # BLD AUTO: 4.43 X10(6)/MCL (ref 4–5.2)
SODIUM SERPL-SCNC: 140 MMOL/L (ref 136–145)
TRIGL SERPL-MCNC: 216 MG/DL (ref 37–140)
VLDLC SERPL CALC-MCNC: 43 MG/DL
WBC # SPEC AUTO: 8.8 X10(3)/MCL (ref 4.5–11)

## 2021-08-23 ENCOUNTER — HISTORICAL (OUTPATIENT)
Dept: ADMINISTRATIVE | Facility: HOSPITAL | Age: 66
End: 2021-08-23

## 2021-08-23 LAB
EST. AVERAGE GLUCOSE BLD GHB EST-MCNC: 171.4 MG/DL
HBA1C MFR BLD: 7.6 %

## 2021-09-20 ENCOUNTER — HISTORICAL (OUTPATIENT)
Dept: RADIOLOGY | Facility: HOSPITAL | Age: 66
End: 2021-09-20

## 2022-01-03 LAB — OCCULT BLOOD: NEGATIVE

## 2022-04-10 ENCOUNTER — HISTORICAL (OUTPATIENT)
Dept: ADMINISTRATIVE | Facility: HOSPITAL | Age: 67
End: 2022-04-10
Payer: MEDICARE

## 2022-04-30 VITALS
HEIGHT: 69 IN | SYSTOLIC BLOOD PRESSURE: 142 MMHG | BODY MASS INDEX: 39.87 KG/M2 | SYSTOLIC BLOOD PRESSURE: 119 MMHG | DIASTOLIC BLOOD PRESSURE: 75 MMHG | DIASTOLIC BLOOD PRESSURE: 74 MMHG | WEIGHT: 269.19 LBS | WEIGHT: 269.19 LBS | DIASTOLIC BLOOD PRESSURE: 81 MMHG | BODY MASS INDEX: 39.87 KG/M2 | HEIGHT: 69 IN | SYSTOLIC BLOOD PRESSURE: 134 MMHG | WEIGHT: 261 LBS | BODY MASS INDEX: 42.52 KG/M2 | HEIGHT: 69 IN | OXYGEN SATURATION: 97 % | SYSTOLIC BLOOD PRESSURE: 128 MMHG | WEIGHT: 287.06 LBS | DIASTOLIC BLOOD PRESSURE: 75 MMHG | HEIGHT: 69 IN | BODY MASS INDEX: 38.66 KG/M2

## 2022-05-02 NOTE — HISTORICAL [facility]
This is a historical note converted from [employer]. Formatting and pictures may have been removed.  Please reference Jose Luis for original formatting and attached multimedia. Chief Complaint  LEFT FOOT PAIN. ?SHE BROKE THE ANKLE 10 YEARS AGO.  History of Present Illness  Patient previously seen for L knee OA presents for evaluation of L ankle pain.? Has a history of ORIF L ankle about 10 year ago with worsening L foot/ankle pain for the last 1-2 weeks.? She denies recent injury.? Complains of worsening pain, swelling to L foot and ankle over the last 1 week.? Has had worsening swelling with pain as well.? Unable to take NSAIDs due to renal insufficiency.? Denies popping, catching, locking.  Review of Systems  Denies fevers, chills, chest pain, shortness of breath, swelling.? Comprehensive review of systems performed and otherwise negative except as in HPI.  Physical Exam  Vitals & Measurements  T:?98.1? ?F (Oral)? BP:?128/81?  HT:?175?cm? WT:?122.1?kg? BMI:?39.87?  General: No acute distress, alert and oriented, healthy appearing  HEENT: Head is atraumatic, mucous membranes are moist  Neck: Supples, no JVD  Cardiovascular: Palpable dorsalis pedis and posterior tibial pulses, regular rate and rhythm to those pulses  Lungs: Breathing non-labored  Skin: no rashes appreciated  Neurologic: Can flex and extend knees, ankles, and toes.? Sensation is grossly intact  ?  L ankle - incisions c/d/i.? BCR distally.? mild swelling with TTP to lateral ankle joint.? Dorsiflexion to 10 deg.? PF to 20 deg.? mild pain with forced DF.  Assessment/Plan  1.?Post-traumatic osteoarthritis, left ankle and foot?M19.172  ?Patient with L ankle post-traumatic OA.? Will start with topical NSAIDs and a boot to try and calm this down.? Discussed injection and eventual fusion if pain persists.? Will re-evaluation in 4-6 weeks.  Ordered:  diclofenac topical, 2 gm =, TOP, QID, PRN PRN as needed for pain, # 100 gm, 0 Refill(s), Pharmacy: [id number]  Clinic Follow up, *Est. 10/15/19 15:15:00 CDT, Order for future visit, Post-traumatic osteoarthritis, left ankle and foot, [facility]  Durable Medical Equipment, 09/17/19 16:36:00 CDT, Fracture boot, [id number], Post-traumatic osteoarthritis, left ankle and foot  Office/Outpatient Visit Level 3 Established [id number] PC, Post-traumatic osteoarthritis, left ankle and foot, [facility], 09/17/19 16:36:00 CDT  ?  Orders:  XR Foot Left Minimum 3 Views, Routine, 09/17/19 15:57:00 CDT, Pain, None, Ambulatory, Rad Type, Left foot pain, Not Scheduled, 09/17/19 15:57:00 CDT  Referrals  Clinic Follow up, *Est. 10/15/19 15:15:00 CDT, Order for future visit, Post-traumatic osteoarthritis, left ankle and foot, [facility]   Problem List/Past Medical History  Ongoing  Breast cancer screening  Cervical cancer screening  Chronic GERD  Chronic kidney disease (CKD)  Diabetes  DM (diabetes mellitus), type 2  High blood pressure disorder  Hyperlipidemia  Hypertension  Immunization due  Lower back pain  Need for influenza vaccination  Need for pneumococcal vaccine  Obesity  Historical  No qualifying data  Procedure/Surgical History  Back fusion (2003)   Medications  amlodipine 10 mg oral tablet, 10 mg= 1 tab(s), Oral, Daily, 3 refills  aspirin 81 mg oral Delayed Release (EC) tablet, 81 mg= 1 tab(s), Oral, Daily  aspirin 81 mg oral tablet, 81 mg= 1 tab(s), Oral, Daily, 3 refills  carvedilol 25 mg oral tablet, 25 mg= 1 tab(s), Oral, BID, 6 refills  Claritin 10 mg oral tablet, 10 mg= 1 tab(s), Oral, Daily, 3 refills  Crestor 20 mg oral tablet, 20 mg= 1 tab(s), Oral, Daily, 3 refills  dexamethasone-tobramycin 0.1%-0.3% ophthalmic suspension, 2 drop(s), Eye-Right, TID  diclofenac 1% topical gel, See Instructions, 4 refills  diclofenac 1% topical gel, 2 gm, TOP, QID, PRN  esomeprazole 40 mg oral DR capsule, 40 mg= 1 cap(s), Oral, Daily,? ?Not Taking per Prescriber  ferrous sulfate 325 mg oral tablet, See Instructions, 1  refills  Fish Oil, Oral  furosemide 40 mg oral tablet, 40 mg= 1 tab(s), Oral, BID  furosemide 80 mg oral tablet, 80 mg= 1 tab(s), Oral, BID,? ?Not taking  gabapentin 300 mg oral capsule, 300 mg= 1 cap(s), Oral, BID, 1 refills  glimepiride 4 mg oral tablet, 4 mg= 1 tab(s), Oral, BID, 3 refills  GLIMEPIRIDE 4MG TAB, See Instructions, 3 refills  hydrochlorothiazide-telmisartan 12.5 mg-80 mg oral tablet, 1 tab(s), Oral, Daily, 3 refills  Insulin needles, See Instructions, 11 refills  Lantus Solostar Pen 100 units/mL subcutaneous solution, See Instructions, 3 refills  Lantus Solostar Pen 100 units/mL subcutaneous solution, 50 units, Subcutaneous, At Bedtime, 11 refills  multivitamin with minerals (Adult Tab), 1 tab(s), Oral, Daily  Next Contour Test Strips, See Instructions, 11 refills  ranitidine 150 mg oral tablet, See Instructions, 1 refills  spironolactone 50 mg oral tablet, See Instructions, 3 refills  Trulicity Pen 0.75 mg/0.5 mL subcutaneous solution, 0.75 mg= 0.5 mL, Subcutaneous, qWeek, 11 refills  Allergies  No Known Allergies  Social History  Abuse/Neglect  No, No, Yes, 09/11/2019  Alcohol - Denies Alcohol Use, 05/28/2015  Never, 09/13/2018  Employment/School  Unemployed, 10/21/2016  Exercise  Exercise type: Walking., 10/21/2016  Home/Environment  Lives with Significant other. Living situation: Home/Independent., 02/13/2019  Nutrition/Health  Diabetic, 10/21/2016  Sexual  Sexually active: No. Sexual orientation: Straight or heterosexual. Gender Identity Identifies as female., 02/13/2019  Substance Use - Denies Substance Abuse, 05/28/2015  Never, 09/03/2019  Tobacco - Denies Tobacco Use, 05/28/2015  Never (less than 100 in lifetime), N/A, Household tobacco concerns: No. Smokeless Tobacco Use: Never., 09/11/2019  Family History  Diabetes mellitus type 2: Mother.  Hypertension.: Mother and Father.  Primary malignant neoplasm of colon: Father.  Immunizations  Vaccine Date Status   influenza virus vaccine,  inactivated 10/29/2018 Recorded   influenza virus vaccine, inactivated 10/25/2017 Given   tetanus/diphtheria/pertussis, acel(Tdap) 03/08/2017 Given   influenza virus vaccine, inactivated 10/21/2016 Given   pneumococcal 13-valent conjugate vaccine 03/09/2016 Given   influenza virus vaccine, inactivated 12/21/2015 Given   zoster vaccine live 10/11/2015 Recorded   influenza virus vaccine, inactivated 10/08/2014 Recorded   tetanus/diphtheria/pertussis, acel(Tdap) 05/28/2014 Recorded   influenza virus vaccine, inactivated 10/25/2011 Recorded   influenza virus vaccine, inactivated 10/20/2010 Recorded   Health Maintenance  Health Maintenance  ???Pending?(in the next year)  ??? ??OverDue  ??? ? ? ?Pneumococcal Vaccine due??and every?  ??? ? ? ?Bone Density Screening due??07/16/15??and every 2??year(s)  ??? ? ? ?Diabetes Maintenance-Fasting Lipid Profile due??07/03/19??and every 1??year(s)  ??? ? ? ?Diabetes Maintenance-Foot Exam due??09/13/19??and every 1??year(s)  ??? ??Due?  ??? ? ? ?Diabetes Maintenance-Eye Exam due??09/17/19??and every?  ??? ? ? ?Hypertension Management-Education due??09/17/19??and every 1??year(s)  ??? ? ? ?Pneumococcal Vaccine due??09/17/19??Variable frequency  ??? ??Due In Future?  ??? ? ? ?Advance Directive not due until??01/01/20??and every 1??year(s)  ??? ? ? ?Alcohol Misuse Screening not due until??01/01/20??and every 1??year(s)  ??? ? ? ?Cognitive Screening not due until??01/01/20??and every 1??year(s)  ??? ? ? ?Fall Risk Assessment not due until??01/01/20??and every 1??year(s)  ??? ? ? ?Functional Assessment not due until??01/01/20??and every 1??year(s)  ??? ? ? ?Geriatric Depression Screening not due until??01/01/20??and every 1??year(s)  ??? ? ? ?Obesity Screening not due until??01/01/20??and every 1??year(s)  ??? ? ? ?Colorectal Screening (Senior Wellness) not due until??02/20/20??and every 1??year(s)  ??? ? ? ?Diabetes Maintenance-Urine Dipstick not due until??02/22/20??and every  1??year(s)  ??? ? ? ?Diabetes Maintenance-Serum Creatinine not due until??02/22/20??and every 1??year(s)  ??? ? ? ?ADL Screening not due until??03/01/20??and every 1??year(s)  ??? ? ? ?Diabetes Maintenance-HgbA1c not due until??09/02/20??and every 1??year(s)  ??? ? ? ?Aspirin Therapy for CVD Prevention not due until??09/03/20??and every 1??year(s)  ???Satisfied?(in the past 1 year)  ??? ??Satisfied?  ??? ? ? ?ADL Screening on??03/01/19.??Satisfied by Gissel Miner  ??? ? ? ?Advance Directive on??03/01/19.??Satisfied by Gissel Miner  ??? ? ? ?Alcohol Misuse Screening on??02/13/19.??Satisfied by Roslyn Vogel RN  ??? ? ? ?Aspirin Therapy for CVD Prevention on??09/03/19.??Satisfied by Oscar Lucas MD  ??? ? ? ?Blood Pressure Screening on??09/17/19.??Satisfied by Kiesha Walker  ??? ? ? ?Body Mass Index Check on??09/17/19.??Satisfied by Kiesha Walker  ??? ? ? ?Breast Cancer Screening on??06/28/19.??Satisfied by Darlene Coelho  ??? ? ? ?Breast Cancer Screening (Senior Wellness) on??06/28/19.??Satisfied by Darlene Coelho  ??? ? ? ?Cognitive Screening on??03/01/19.??Satisfied by Gissel Miner  ??? ? ? ?Colorectal Screening on??02/20/19.??Satisfied by Dottie Mercer  ??? ? ? ?Colorectal Screening (Senior Wellness) on??02/20/19.??Satisfied by Dottie Mercer  ??? ? ? ?Depression Screening on??03/01/19.??Satisfied by Gissel Miner  ??? ? ? ?Diabetes Maintenance-HgbA1c on??09/03/19.??Satisfied by Dottie Damon  ??? ? ? ?Diabetes Maintenance-Serum Creatinine on??02/22/19.??Satisfied by Gretchen Jackson  ??? ? ? ?Diabetes Maintenance-Urine Dipstick on??02/22/19.??Satisfied by Cecilio Gibson  ??? ? ? ?Diabetes Screening on??05/29/19.??Satisfied by Omaira Kwan  ??? ? ? ?Fall Risk Assessment on??03/01/19.??Satisfied by Gissel Miner  ??? ? ? ?Functional Assessment on??06/18/19.??Satisfied by Dayami Shaffer LPN  ??? ? ? ?Geriatric Depression Screening  on??03/01/19.??Satisfied by Gissel Miner  ??? ? ? ?Hypertension Management-Blood Pressure on??09/17/19.??Satisfied by Kiesha Walker  ??? ? ? ?Hypertension Management-BMP on??02/22/19.??Satisfied by Gretchen Jackson  ??? ? ? ?Influenza Vaccine on??10/29/18.??Satisfied by Gissel Miner  ??? ? ? ?Obesity Screening on??09/17/19.??Satisfied by Kiesha Walker  ?  Diagnostic Results  AP, Lat L foot reviewed - patient with end stage post traumatic OA to L tibiotalar joint

## 2022-05-02 NOTE — HISTORICAL [facility]
This is a historical note converted from Jose Luis. Formatting and pictures may have been removed.  Please reference Jose Luis for original formatting and attached multimedia. Chief Complaint  Routine follow up for chronic gout. 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  ?  No acute complaints today. ?Still having some infrequent?flares of gout.? Does not want to try Uloric at this time.? Due for labs today.  ?  States her sugars have not been well controlled at home.? Was previously having lows so had stopped Trulicity but was ill and with no appetite at that time due to SJS/TEN.? Not compliant with diet.  Review of Systems  Constitutional:?no fever,?no chills,?no fatigue  Respiratory:?no SOB,?no cough,?no wheezing  Cardiovascular:?no chest pain,?no palpitations,?no peripheral edema  Physical Exam  Vitals & Measurements  T:?36.5? ?C (Oral)? HR:?67(Peripheral)? RR:?18? BP:?134/84? SpO2:?100%?  HT:?175?cm? WT:?123.3?kg? BMI:?40.26?  General: NAD, alert, well-appearing  Neck: supple, full ROM, no LAD, no JVD, no thyromegaly  CV: RRR, S1, S2,?no murmurs; no edema  Resp: CTAB, nonlabored respirations, no adventitious breath sounds  Skin: warm, dry, intact, diffuse post inflammatory hypopigmentation on trunk and extremities  Assessment/Plan  1.?DM (diabetes mellitus), type 2?E11.9  2.?Chronic gout?[id number]  3.?Chronic kidney disease (CKD)?N18.9  4.?Hypertension?I10  Orders:  insulin glargine, See Instructions, 50 units once before bedtime. rotate injection sites., # 45 mL, 0 Refill(s), Pharmacy: [id number], 175, cm, Height/Length Dosing, 06/22/20 11:24:00 CDT, 123.3, kg, Weight Dosing, 06/22/20 11:24:00 CDT  Misc Prescription, Insulin needles, See Instructions, Lantus pen needles give enough for 1 month supply DM E11.9, # 1 kit(s), 11 Refill(s), Pharmacy:  [id number], 175, cm, Height/Length Dosing, 06/22/20 11:24:00 CDT, 123.3, kg, Weight Dosing, 06/22/20 11:24:0...  ?  Labs today: Uric acid, BMP, A1c, CBC, urine mi/cr  Continue current meds; may need to add back Trulicity  RTC in 1 month to est with new PCP  Referrals  Clinic Follow up, *Est. 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 ?Unable to obtain  ferrous sulfate 325 mg (65 mg elemental iron) oral tablet, See Instructions  Fish Oil, Oral  glipiZIDE 10 mg oral tablet, 10 mg= 1 tab(s), Oral, Daily  Insulin needles, See Instructions, 11 refills  Lantus Solostar Pen 100 units/mL subcutaneous solution, See Instructions  Lasix 20 mg oral tablet, 20 mg= 1 tab(s), Oral, Daily  loratadine 10 mg oral tablet, 10 mg= 1 tab(s), Oral, Daily  losartan 100 mg oral tablet, 100 mg= 1 tab(s), Oral, Daily, 2 refills  multivitamin with minerals (Adult Tab), 1 tab(s), Oral, Daily  Next Contour Test Strips, See Instructions, 11 refills  Pantoprazole 40 mg  ORAL EC-Tablet, 40 mg= 1 tab(s), Oral, Daily  RELION PEN NEEDLE [id number] MIS, See Instructions, 11 refills  rosuvastatin 20 mg oral tablet, 20 mg= 1 tab(s), Oral, qPM  Therapeutic Multiple Vitamins with Minerals oral tablet, 1 tab(s), Oral, Daily  Allergies  allopurinol?(Moses Abilio syndrome)  Social History  Abuse/Neglect  No, No, Yes, 06/22/2020  Alcohol - Denies Alcohol Use, 05/28/2015  Never, 10/15/2019  Employment/School  Unemployed, 10/21/2016  Exercise  Exercise type: Walking., 10/21/2016  Home/Environment  Lives with Significant other. Living situation: Home/Independent., 02/13/2019  Nutrition/Health  Diabetic, 10/21/2016  Sexual  Sexually active: No. Sexual orientation: Straight or heterosexual. Gender Identity Identifies as female., 02/13/2019  Spiritual/Cultural  Restorationist, 04/08/2020  Substance Use - Denies Substance Abuse, 05/28/2015  Never, 10/15/2019  Tobacco - Denies Tobacco Use, 05/28/2015  Former smoker, quit more than 30 days ago, N/A, 06/22/2020  Family History  Diabetes mellitus type 2: Mother.  Hypertension.: Mother and Father.  Primary malignant neoplasm of colon: Father.  Immunizations  Vaccine Date Status   pneumococcal 13-valent conjugate vaccine 11/08/2019 Recorded   influenza virus vaccine, inactivated 11/08/2019 Recorded   influenza virus vaccine, inactivated 10/29/2018 Recorded   influenza virus vaccine, inactivated 10/25/2017 Given   tetanus/diphtheria/pertussis, acel(Tdap) 03/08/2017 Given   influenza virus vaccine, inactivated 10/21/2016 Given   pneumococcal 13-valent conjugate vaccine 03/09/2016 Given   influenza virus vaccine, inactivated 12/21/2015 Given   zoster vaccine live 10/11/2015 Recorded   influenza virus vaccine, inactivated 10/08/2014 Recorded   tetanus/diphtheria/pertussis, acel(Tdap) 05/28/2014 Recorded   influenza virus vaccine, inactivated 10/25/2011 Recorded   influenza virus vaccine, inactivated 10/20/2010 Recorded   Health Maintenance  Health  Maintenance  ???Pending?(in the next year)  ??? ??OverDue  ??? ? ? ?Pneumococcal Vaccine due??and every?  ??? ? ? ?Bone Density Screening due??07/16/15??and every 2??year(s)  ??? ? ? ?Diabetes Maintenance-Fasting Lipid Profile due??07/03/19??and every 1??year(s)  ??? ? ? ?Diabetes Maintenance-Foot Exam due??09/13/19??and every 1??year(s)  ??? ? ? ?Advance Directive due??01/01/20??and every 1??year(s)  ??? ? ? ?Colorectal Screening (Senior Wellness) due??02/20/20??and every 1??year(s)  ??? ??Due?  ??? ? ? ?Diabetes Maintenance-Eye Exam due??06/22/20??and every?  ??? ? ? ?Hypertension Management-Education due??06/22/20??and every 1??year(s)  ??? ? ? ?Medicare Annual Wellness Exam due??06/22/20??and every 1??year(s)  ??? ??Due In Future?  ??? ? ? ?Diabetes Maintenance-Urine Dipstick not due until??12/21/20??and every 1??year(s)  ??? ? ? ?ADL Screening not due until??12/23/20??and every 1??year(s)  ??? ? ? ?Alcohol Misuse Screening not due until??01/01/21??and every 1??year(s)  ??? ? ? ?Cognitive Screening not due until??01/01/21??and every 1??year(s)  ??? ? ? ?Fall Risk Assessment not due until??01/01/21??and every 1??year(s)  ??? ? ? ?Functional Assessment not due until??01/01/21??and every 1??year(s)  ??? ? ? ?Obesity Screening not due until??01/01/21??and every 1??year(s)  ??? ? ? ?Aspirin Therapy for CVD Prevention not due until??01/31/21??and every 1??year(s)  ??? ? ? ?Diabetes Maintenance-HgbA1c not due until??03/02/21??and every 1??year(s)  ??? ? ? ?Hypertension Management-BMP not due until??04/08/21??and every 1??year(s)  ??? ? ? ?Diabetes Maintenance-Serum Creatinine not due until??04/08/21??and every 1??year(s)  ???Satisfied?(in the past 1 year)  ??? ??Satisfied?  ??? ? ? ?ADL Screening on??12/23/19.??Satisfied by Dinah Smith RN  ??? ? ? ?Alcohol Misuse Screening on??03/02/20.??Satisfied by Xiomara Bauer LPN  ??? ? ? ?Aspirin Therapy for CVD Prevention on??01/31/20.??Satisfied by Lance KENDRICK,  Oscar  ??? ? ? ?Blood Pressure Screening on??06/22/20.??Satisfied by Genoveva Abel  ??? ? ? ?Body Mass Index Check on??06/22/20.??Satisfied by Genoveva Abel  ??? ? ? ?Breast Cancer Screening on??06/28/19.??Satisfied by Darlene Coelho  ??? ? ? ?Breast Cancer Screening ([facility]) on??06/28/19.??Satisfied by Darlene Coelho  ??? ? ? ?Cognitive Screening on??03/02/20.??Satisfied by Xiomara Bauer LPN  ??? ? ? ?Depression Screening on??01/31/20.??Satisfied by Alexandra Sutton LPN  ??? ? ? ?Diabetes Maintenance-Serum Creatinine on??04/08/20.??Satisfied by Em Calderón  ??? ? ? ?Diabetes Maintenance-HgbA1c on??03/02/20.??Satisfied by Teresa López  ??? ? ? ?Diabetes Maintenance-Urine Dipstick on??12/21/19.??Satisfied by Yolie Truong  ??? ? ? ?Diabetes Screening on??04/08/20.??Satisfied by Em Calderón  ??? ? ? ?Fall Risk Assessment on??06/22/20.??Satisfied by Genoveva Abel  ??? ? ? ?Functional Assessment on??03/02/20.??Satisfied by Xiomara Bauer LPN  ??? ? ? ?Hypertension Management-Blood Pressure on??06/22/20.??Satisfied by Genoveva Abel  ??? ? ? ?Hypertension Management-BMP on??04/08/20.??Satisfied by Em Calderón  ??? ? ? ?Influenza Vaccine on??11/08/19.??Satisfied by Roslyn Vogel RN  ??? ? ? ?Obesity Screening on??06/22/20.??Satisfied by Genoveva Abel  ??? ? ? ?Pneumococcal Vaccine on??11/08/19.??Satisfied by Roslyn Vogel RN  ?      I was present with the resident during the history and exam.  ?  [ x ] I discussed the case with the resident and agree with the findings and plan as documented in the residents note.

## 2022-05-02 NOTE — HISTORICAL [facility]ERNER
This is a historical note converted from [employer]. Formatting and pictures may have been removed.  Please reference C[facility] for original formatting and attached multimedia. Chief Complaint  RIGHT FOOT PAIN STARTED MONDAY AND SHE COULD NOT BEAR WEIGHT.  History of Present Illness  63-year-old female previously seen for bilateral knees as well as left ankle at last week.? She is currently being evaluated for a boot.? Presents today for evaluation of right foot pain. ?This been going on for the last week. ?She is having swelling and pain to her right foot. ?Denies any injuries.? Worse with mobility. ?She is having difficulty walking due to her foot pain.  Review of Systems  Denies fevers, chills, chest pain, shortness of breath. Comprehensive review of systems performed and otherwise negative except as noted in HPI.  Physical Exam  Vitals & Measurements  T:?97.7? ?F (Oral)? BP:?119/74?  HT:?175?cm? WT:?122.1?kg? BMI:?39.87?  General: No acute distress, alert and oriented, healthy appearing?  HEENT: Head is atraumatic, mucous membranes are moist  Neck: Supples, no JVD  Cardiovascular: Palpable dorsalis pedis and posterior tibial pulses, regular rate and rhythm to those pulses  Lungs: Breathing non-labored  Skin: no rashes appreciated  Neurologic: Can flex and extend knees, ankles, and toes. Sensation is grossly intact  ?  Right foot:  Patient overall with a planovalgus foot deformity. ?She has significant falling arches?compared to contralateral side. ?She has diffuse tenderness and swelling about the right foot?mainly in the medial aspect of her deltoid  Assessment/Plan  1.?Swelling of right foot?M79.89  ?Patient with swelling to the right foot. ?We will send her to physical therapy to work on stretching and range of motion given her significant limited?dorsiflexion.? From there if she continues to have pain, we could get her fitted for inserts.  Orders:  XR Foot Right Minimum 3 Views, Routine, 09/26/19 9:13:00 CDT, Pain,  None, Ambulatory, Rad Type, Right foot pain, Not Scheduled, 09/26/19 9:13:00 CDT   Problem List/Past Medical History  Ongoing  Breast cancer screening  Cervical cancer screening  Chronic GERD  Chronic kidney disease (CKD)  Diabetes  DM (diabetes mellitus), type 2  High blood pressure disorder  Hyperlipidemia  Hypertension  Immunization due  Lower back pain  Need for influenza vaccination  Need for pneumococcal vaccine  Obesity  Historical  No qualifying data  Procedure/Surgical History  Back fusion (2003)   Medications  amlodipine 10 mg oral tablet, 10 mg= 1 tab(s), Oral, Daily, 3 refills  aspirin 81 mg oral Delayed Release (EC) tablet, 81 mg= 1 tab(s), Oral, Daily  aspirin 81 mg oral tablet, 81 mg= 1 tab(s), Oral, Daily, 3 refills  carvedilol 25 mg oral tablet, 25 mg= 1 tab(s), Oral, BID, 6 refills  Claritin 10 mg oral tablet, 10 mg= 1 tab(s), Oral, Daily, 3 refills  Crestor 20 mg oral tablet, 20 mg= 1 tab(s), Oral, Daily, 3 refills  dexamethasone-tobramycin 0.1%-0.3% ophthalmic suspension, 2 drop(s), Eye-Right, TID  diclofenac 1% topical gel, See Instructions, 4 refills  diclofenac 1% topical gel, 2 gm, TOP, QID, PRN  esomeprazole 40 mg oral DR capsule, 40 mg= 1 cap(s), Oral, Daily,? ?Not Taking per Prescriber  ferrous sulfate 325 mg oral tablet, See Instructions, 1 refills  Fish Oil, Oral  furosemide 40 mg oral tablet, 40 mg= 1 tab(s), Oral, BID  furosemide 80 mg oral tablet, 80 mg= 1 tab(s), Oral, BID,? ?Not taking  gabapentin 300 mg oral capsule, 300 mg= 1 cap(s), Oral, BID, 1 refills  glimepiride 4 mg oral tablet, 4 mg= 1 tab(s), Oral, BID, 3 refills  GLIMEPIRIDE 4MG TAB, See Instructions, 3 refills  hydrochlorothiazide-telmisartan 12.5 mg-80 mg oral tablet, 1 tab(s), Oral, Daily, 3 refills  Insulin needles, See Instructions, 11 refills  Lantus Solostar Pen 100 units/mL subcutaneous solution, See Instructions, 3 refills  Lantus Solostar Pen 100 units/mL subcutaneous solution, 50 units, Subcutaneous, At  Bedtime, 11 refills  multivitamin with minerals (Adult Tab), 1 tab(s), Oral, Daily  Next Contour Test Strips, See Instructions, 11 refills  ranitidine 150 mg oral tablet, See Instructions, 1 refills  spironolactone 50 mg oral tablet, See Instructions, 3 refills  Trulicity Pen 0.75 mg/0.5 mL subcutaneous solution, 0.75 mg= 0.5 mL, Subcutaneous, qWeek, 11 refills  Allergies  No Known Allergies  Social History  Abuse/Neglect  No, No, Yes, 09/11/2019  Alcohol - Denies Alcohol Use, 05/28/2015  Never, 09/13/2018  Employment/School  Unemployed, 10/21/2016  Exercise  Exercise type: Walking., 10/21/2016  Home/Environment  Lives with Significant other. Living situation: Home/Independent., 02/13/2019  Nutrition/Health  Diabetic, 10/21/2016  Sexual  Sexually active: No. Sexual orientation: Straight or heterosexual. Gender Identity Identifies as female., 02/13/2019  Substance Use - Denies Substance Abuse, 05/28/2015  Never, 09/03/2019  Tobacco - Denies Tobacco Use, 05/28/2015  Never (less than 100 in lifetime), N/A, Household tobacco concerns: No. Smokeless Tobacco Use: Never., 09/11/2019  Family History  Diabetes mellitus type 2: Mother.  Hypertension.: Mother and Father.  Primary malignant neoplasm of colon: Father.  Immunizations  Vaccine Date Status   influenza virus vaccine, inactivated 10/29/2018 Recorded   influenza virus vaccine, inactivated 10/25/2017 Given   tetanus/diphtheria/pertussis, acel(Tdap) 03/08/2017 Given   influenza virus vaccine, inactivated 10/21/2016 Given   pneumococcal 13-valent conjugate vaccine 03/09/2016 Given   influenza virus vaccine, inactivated 12/21/2015 Given   zoster vaccine live 10/11/2015 Recorded   influenza virus vaccine, inactivated 10/08/2014 Recorded   tetanus/diphtheria/pertussis, acel(Tdap) 05/28/2014 Recorded   influenza virus vaccine, inactivated 10/25/2011 Recorded   influenza virus vaccine, inactivated 10/20/2010 Recorded   Health Maintenance  Health Maintenance  ???Pending?(in  the next year)  ??? ??OverDue  ??? ? ? ?Pneumococcal Vaccine due??and every?  ??? ? ? ?Bone Density Screening due??07/16/15??and every 2??year(s)  ??? ? ? ?Diabetes Maintenance-Fasting Lipid Profile due??07/03/19??and every 1??year(s)  ??? ? ? ?Diabetes Maintenance-Foot Exam due??09/13/19??and every 1??year(s)  ??? ??Due?  ??? ? ? ?Diabetes Maintenance-Eye Exam due??09/26/19??and every?  ??? ? ? ?Hypertension Management-Education due??09/26/19??and every 1??year(s)  ??? ? ? ?Pneumococcal Vaccine due??09/26/19??Variable frequency  ??? ??Due In Future?  ??? ? ? ?Advance Directive not due until??01/01/20??and every 1??year(s)  ??? ? ? ?Alcohol Misuse Screening not due until??01/01/20??and every 1??year(s)  ??? ? ? ?Cognitive Screening not due until??01/01/20??and every 1??year(s)  ??? ? ? ?Fall Risk Assessment not due until??01/01/20??and every 1??year(s)  ??? ? ? ?Functional Assessment not due until??01/01/20??and every 1??year(s)  ??? ? ? ?Geriatric Depression Screening not due until??01/01/20??and every 1??year(s)  ??? ? ? ?Obesity Screening not due until??01/01/20??and every 1??year(s)  ??? ? ? ?Colorectal Screening (Senior Wellness) not due until??02/20/20??and every 1??year(s)  ??? ? ? ?ADL Screening not due until??03/01/20??and every 1??year(s)  ??? ? ? ?Aspirin Therapy for CVD Prevention not due until??09/03/20??and every 1??year(s)  ??? ? ? ?Diabetes Maintenance-HgbA1c not due until??09/16/20??and every 1??year(s)  ??? ? ? ?Diabetes Maintenance-Urine Dipstick not due until??09/23/20??and every 1??year(s)  ??? ? ? ?Diabetes Maintenance-Serum Creatinine not due until??09/23/20??and every 1??year(s)  ???Satisfied?(in the past 1 year)  ??? ??Satisfied?  ??? ? ? ?ADL Screening on??03/01/19.??Satisfied by Gissel Miner  ??? ? ? ?Advance Directive on??03/01/19.??Satisfied by Gissel Miner  ??? ? ? ?Alcohol Misuse Screening on??02/13/19.??Satisfied by Roslyn Vogel RN  ??? ? ? ?Aspirin Therapy for CVD  Prevention on??09/03/19.??Satisfied by Oscar Lucas MD  ??? ? ? ?Blood Pressure Screening on??09/26/19.??Satisfied by Kiesha Walker  ??? ? ? ?Body Mass Index Check on??09/26/19.??Satisfied by Kiesha Walker  ??? ? ? ?Breast Cancer Screening on??06/28/19.??Satisfied by Darlene Coelho  ??? ? ? ?Breast Cancer Screening (Senior Wellness) on??06/28/19.??Satisfied by Darlene Coelho  ??? ? ? ?Cognitive Screening on??03/01/19.??Satisfied by Gissel Miner  ??? ? ? ?Colorectal Screening on??02/20/19.??Satisfied by Dottie Mercer  ??? ? ? ?Colorectal Screening (Senior Wellness) on??02/20/19.??Satisfied by Dottie Mercer  ??? ? ? ?Depression Screening on??03/01/19.??Satisfied by Gissel Miner  ??? ? ? ?Diabetes Maintenance-Serum Creatinine on??09/23/19.??Satisfied by Em Calderón  ??? ? ? ?Diabetes Maintenance-Urine Dipstick on??09/23/19.??Satisfied by Yasmin Leal  ??? ? ? ?Diabetes Maintenance-HgbA1c on??09/03/19.??Satisfied by Dottie Damon  ??? ? ? ?Diabetes Screening on??09/23/19.??Satisfied by Em Calderón  ??? ? ? ?Fall Risk Assessment on??03/01/19.??Satisfied by Gissel Miner  ??? ? ? ?Functional Assessment on??06/18/19.??Satisfied by Dayami Shaffer LPN  ??? ? ? ?Geriatric Depression Screening on??03/01/19.??Satisfied by Gissel Miner  ??? ? ? ?Hypertension Management-Blood Pressure on??09/26/19.??Satisfied by Denise, Kiesha  ??? ? ? ?Hypertension Management-BMP on??09/23/19.??Satisfied by Em Calderón  ??? ? ? ?Influenza Vaccine on??10/29/18.??Satisfied by Gissel Miner  ??? ? ? ?Obesity Screening on??09/26/19.??Satisfied by Kiesha Walker  ?  Diagnostic Results  AP lateral right foot reviewed. ?Patient with some early arthritic changes to her midfoot although no acute abnormalities noted.

## 2022-05-02 NOTE — HISTORICAL [facility]
This is a historical note converted from [employer]. Formatting and pictures may have been removed.  Please reference [employer] for original formatting and attached multimedia. Chief Complaint  follow up on diabetes  History of Present Illness  65 yo F here to address and follow up on her diabetes. PCP is Dr. Lucas. She saw him on 01/31/2020 and had her other chronic conditions addressed.  ?  Last HgbA1c: 8.4 % (05/2019)  Rx: Lantus 50 units at bedtime, Glipizide 10mg daily, (was previously on Trulicity, DCd 12/2019)  Sugars: fasting sugars ranging from 115-150. Denies any symptoms of hypoglycemia. Reports compliance with medications and says she has been adhering to diabetic diet.  ?  Due: Eye exam, foot exam.  Review of Systems  Constitutional: no fever, no chills, no weight loss  CV: no swelling, no edema, no chest pain, no palpitations  ENT: no cough, no nasal congestion  : no urinary retention, no urinary incontinence  GI:? no constipation, no diarrhea, no nausea, no vomiting  RESP: no SOB, no wheezing, no difficulty breathing  Skin: + rash, no wound, no discolorations  Psych: no depression, no anxiety  Physical Exam  Vitals & Measurements  T:?36.7? ?C (Oral)? HR:?84(Peripheral)? RR:?22? BP:?132/65? SpO2:?97%? WT:?111.9?kg? BMI:?36.54?  General: NAD; A&O x 3; responds appropriately to commands  PSYCH: alert and oriented with?appropriate mood and affect  RESP: lungs CTA b/l, no wheezing, non-labored respirations  CV: +2 symmetrical pulses (radial), no edema of LEs b/l, no murmurs, rubs or gallops upon auscultation  ABD: NTTP, BS +4, no organomegaly  FOOT: no sensory deficits noted at 8 distinct points bilaterally. No cracks, ulcerations or fissures. Pedal pulses +2  Assessment/Plan  1.?Diabetes?E11.9  -HgbA1c today  -Foot exam today  -Referred for fundus photo today  -Continue Lantus 50 units at bedtime and Glipizide 10mg  -ED precautions given  -Discussed: medication compliance, diet exercise, insulin compliance,  CBG logs  Ordered:  Clinic Follow up, *Est. 04/13/20 3:00:00 CDT, Order for future visit, Diabetes, [facility]  Hemoglobin A1C [facility], Routine collect, *Est. 03/02/20 3:00:00 CST, Blood, Order for future visit, *Est. Stop date 03/02/20 3:00:00 CST, Lab Collect, Diabetes, 03/02/20 15:31:00 CST  ?  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 ?Not taking  carvedilol 25 mg oral tablet, 25 mg= 1 tab(s), Oral, BID  docusate-senna 50 mg-8.6 mg oral tablet, 1 tab(s), Oral, BID  Fish Oil, Oral  glipiZIDE 10 mg oral tablet, 10 mg= 1 tab(s), Oral, Daily  hydrochlorothiazide-losartan 12.5 mg-100 mg oral tablet, 1 tab(s), Oral, Daily  hydrOXYzine hydrochloride 50 mg oral tablet, 50 mg= 1 tab(s), Oral, TID, PRN,? ?Not taking  Insulin needles, See Instructions, 11 refills  Lantus Solostar Pen 100 units/mL subcutaneous solution, Subcutaneous, qPM  loratadine 10 mg oral tablet, 10 mg= 1 tab(s), Oral, Daily  Melatonin 3 mg oral tablet, 3 mg= 1 tab(s), Oral, Once a day (at bedtime), PRN,? ?Not taking  multivitamin with minerals (Adult Tab), 1 tab(s), Oral, Daily  Next Contour Test Strips, See Instructions, 11 refills  Pantoprazole 40 mg ORAL  EC-Tablet, 40 mg= 1 tab(s), Oral, Daily  RELION PEN NEEDLE 46TH5PB MIS, See Instructions, 11 refills  rosuvastatin 20 mg oral tablet, 20 mg= 1 tab(s), Oral, qPM  Therapeutic Multiple Vitamins with Minerals oral tablet, 1 tab(s), Oral, Daily  Allergies  allopurinol?(Moses Abilio syndrome)  Social History  Abuse/Neglect  No, No, 03/02/2020  Alcohol - Denies Alcohol Use, 05/28/2015  Never, 10/15/2019  Employment/School  Unemployed, 10/21/2016  Exercise  Exercise type: Walking., 10/21/2016  Home/Environment  Lives with Significant other. Living situation: Home/Independent., 02/13/2019  Nutrition/Health  Diabetic, 10/21/2016  Sexual  Sexually active: No. Sexual orientation: Straight or heterosexual. Gender Identity Identifies as female., 02/13/2019  Substance Use - Denies Substance Abuse, 05/28/2015  Never, 10/15/2019  Tobacco - Denies Tobacco Use, 05/28/2015  Former smoker, quit more than 30 days ago, N/A, Household tobacco concerns: No. Smokeless Tobacco Use: Never., 03/02/2020  Family History  Diabetes mellitus type 2: Mother.  Hypertension.: Mother and Father.  Primary malignant neoplasm of colon: Father.  Immunizations  Vaccine Date Status   pneumococcal 13-valent conjugate vaccine 11/08/2019 Recorded   influenza virus vaccine, inactivated 11/08/2019 Recorded   influenza virus vaccine, inactivated 10/29/2018 Recorded   influenza virus vaccine, inactivated 10/25/2017 Given   tetanus/diphtheria/pertussis, acel(Tdap) 03/08/2017 Given   influenza virus vaccine, inactivated 10/21/2016 Given   pneumococcal 13-valent conjugate vaccine 03/09/2016 Given   influenza virus vaccine, inactivated 12/21/2015 Given   zoster vaccine live 10/11/2015 Recorded   influenza virus vaccine, inactivated 10/08/2014 Recorded   tetanus/diphtheria/pertussis, acel(Tdap) 05/28/2014 Recorded   influenza virus vaccine, inactivated 10/25/2011 Recorded   influenza virus vaccine, inactivated 10/20/2010 Recorded       Pt discussed and seen with  resident at the time of visit. Agree with assessment and plan.

## 2022-05-02 NOTE — HISTORICAL [facility]ER
This is a historical note converted from Jose Luis. Formatting and pictures may have been removed.  Please reference Jose Luis for original formatting and attached multimedia. Chief Complaint  BILATERAL KNEE PAIN . LEFT IS WORSE. ?SHE ?USED TO EXERCISE EVERY MORNING AND HAD TO STOP. ?NO INJURY.  History of Present Illness  63-year-old female presents today for evaluation of bilateral knee pain left worse than right. ?Patient was doing quite well she went on vacation?with her son had a lot of walking. ?She came back and had significant left knee pain. ?She has tried anti-inflammatories although she is unable to take these extensively given her renal dysfunction. ?She is tried to modification. ?She continues to have pain to both of her knees left worse than right.? Notes popping catching and cracking to her left knee at times.  Review of Systems  Denies fevers, chills, chest pain, shortness of breath. Comprehensive review of systems performed and otherwise negative except as noted in HPI.  Physical Exam  Vitals & Measurements  T:?97.4? ?F (Oral)? BP:?142/75?  HT:?175?cm? WT:?118.38?kg? BMI:?38.65?  General: No acute distress, alert and oriented, healthy appearing?  HEENT: Head is atraumatic, mucous membranes are moist  Neck: Supples, no JVD  Cardiovascular: Palpable dorsalis pedis and posterior tibial pulses, regular rate and rhythm to those pulses  Lungs: Breathing non-labored  Skin: no rashes appreciated  Neurologic: Can flex and extend knees, ankles, and toes. Sensation is grossly intact  ?  Bilateral knee:  Brisk cap refill distally. ?Sensation intact distally. ?Crepitus range of motion bilateral knees.? Range of motion of both knees is from 5-110. ?Stable to varus and valgus.? Significant patellofemoral crepitus to bilateral knees.  Assessment/Plan  1.?Primary osteoarthritis of left knee?M17.12  ?Patient left knee osteoarthritis we will try an injection to calm this down.? Patient would be a candidate for knee  arthroplasty in the future?if she fails?injection other conservative measures.  ?  After verbal consent was obtained the patients left knee was prepped with Betadine and anesthetized with ethyl chloride. The left knee joint was then injected under sterile technique with 2 mL of 2% lidocaine and 12 mg betamethasone. It was dressed with a Band-Aid. The patient received good relief from the injection and was able to ambulate normally from clinic.  Ordered:  betamethasone, 12 mg, Intra-Articular, Once, first dose 06/13/19 16:00:00 CDT, stop date 06/13/19 16:00:00 CDT  Lidocaine inj., 2 mL, Intra-Articular, Once, first dose 06/13/19 15:13:00 CDT, stop date 06/13/19 15:13:00 CDT  asp/inj jnt/bursa, major [id number] PC, 06/13/19 15:13:00 CDT, Orthopaedics Clinic, Routine, 06/13/19 15:13:00 CDT  Clinic Follow up, *Est. 07/11/19 3:00:00 CDT, Order for future visit, Primary osteoarthritis of left knee  Primary osteoarthritis of right knee, Orthopaedics  Office/Outpatient Visit Level 4 New [id number] PC, Primary osteoarthritis of left knee  Primary osteoarthritis of right knee, Orthopaedics Clinic, 06/13/19 15:13:00 CDT  ?  2.?Primary osteoarthritis of right knee?M17.11  ?Patients right knee osteoarthritis. ?She like to hold off on injection today. ?If she gets a good result in the left, she may come back for right knee injection.  Ordered:  Clinic Follow up, *Est. 07/11/19 3:00:00 CDT, Order for future visit, Primary osteoarthritis of left knee  Primary osteoarthritis of right knee, Orthopaedics  Office/Outpatient Visit Level 4 New [id number] PC, Primary osteoarthritis of left knee  Primary osteoarthritis of right knee, Orthopaedics Clinic, 06/13/19 15:13:00 CDT  ?  Orders:  XR Knee Left 4 or More Views, Routine, 06/13/19 14:30:00 CDT, Pain, None, Ambulatory, Rad Type, Bilateral knee pain, Not Scheduled, 06/13/19 14:30:00 CDT  XR Knee Right 4 or More Views, Routine, 06/13/19 14:30:00 CDT, Pain, None, Ambulatory, Rad Type,  Bilateral knee pain, Not Scheduled, 06/13/19 14:30:00 CDT  Referrals  Clinic Follow up, *Est. 07/11/19 3:00:00 CDT, Order for future visit, Primary osteoarthritis of left knee  Primary osteoarthritis of right knee, [facility]   Problem List/Past Medical History  Ongoing  Breast cancer screening  Cervical cancer screening  Chronic GERD  Chronic kidney disease (CKD)  Diabetes  DM (diabetes mellitus), type 2  High blood pressure disorder  Hyperlipidemia  Hypertension  Immunization due  Lower back pain  Need for influenza vaccination  Need for pneumococcal vaccine  Obesity  Historical  No qualifying data  Procedure/Surgical History  Back fusion (2003)   Medications  amlodipine 10 mg oral tablet, 10 mg= 1 tab(s), Oral, Daily, 3 refills  aspirin 81 mg oral tablet, 81 mg= 1 tab(s), Oral, Daily, 3 refills  carvedilol 25 mg oral tablet, 25 mg= 1 tab(s), Oral, BID, 6 refills  Celestone, 12 mg, Intra-Articular, Once  Claritin 10 mg oral tablet, 10 mg= 1 tab(s), Oral, Daily, 3 refills  Crestor 20 mg oral tablet, 20 mg= 1 tab(s), Oral, Daily, 3 refills  ferrous sulfate 325 mg oral tablet, See Instructions, 1 refills  Fish Oil, Oral  gabapentin 300 mg oral capsule, 300 mg= 1 cap(s), Oral, BID, 1 refills  glimepiride 4 mg oral tablet, 4 mg= 1 tab(s), Oral, BID, 3 refills  hydrochlorothiazide-telmisartan 12.5 mg-80 mg oral tablet, 1 tab(s), Oral, Daily, 3 refills  Insulin needles, See Instructions, 11 refills  Lantus Solostar Pen 100 units/mL subcutaneous solution, 50 units, Subcutaneous, At Bedtime, 11 refills  lidocaine 2% injectable solution, 2 mL, Intra-Articular, Once  multivitamin with minerals (Adult Tab), 1 tab(s), Oral, Daily  ranitidine 150 mg oral tablet, See Instructions, 1 refills  spironolactone 50 mg oral tablet, 50 mg= 1 tab(s), Oral, Daily, 3 refills  Trulicity Pen 0.75 mg/0.5 mL subcutaneous solution, 0.75 mg= 0.5 mL, Subcutaneous, qWeek, 11 refills  Allergies  No Known Allergies  Social  History  Abuse/Neglect  No, 06/13/2019  Alcohol - Denies Alcohol Use, 05/28/2015  Never, 09/13/2018  Employment/School  Unemployed, 10/21/2016  Exercise  Exercise type: Walking., 10/21/2016  Home/Environment  Lives with Significant other. Living situation: Home/Independent., 02/13/2019  Nutrition/Health  Diabetic, 10/21/2016  Sexual  Sexually active: No. Sexual orientation: Straight or heterosexual. Gender Identity Identifies as female., 02/13/2019  Substance Use - Denies Substance Abuse, 05/28/2015  Tobacco - Denies Tobacco Use, 05/28/2015  Never (less than 100 in lifetime), N/A, 06/13/2019  Family History  Diabetes mellitus type 2: Mother.  Hypertension.: Mother and Father.  Primary malignant neoplasm of colon: Father.  Immunizations  Vaccine Date Status   influenza virus vaccine, inactivated 10/29/2018 Recorded   influenza virus vaccine, inactivated 10/25/2017 Given   tetanus/diphtheria/pertussis, acel(Tdap) 03/08/2017 Given   influenza virus vaccine, inactivated 10/21/2016 Given   pneumococcal 13-valent conjugate vaccine 03/09/2016 Given   influenza virus vaccine, inactivated 12/21/2015 Given   zoster vaccine live 10/11/2015 Recorded   influenza virus vaccine, inactivated 10/08/2014 Recorded   tetanus/diphtheria/pertussis, acel(Tdap) 05/28/2014 Recorded   influenza virus vaccine, inactivated 10/25/2011 Recorded   influenza virus vaccine, inactivated 10/20/2010 Recorded   Health Maintenance  Health Maintenance  ???Pending?(in the next year)  ??? ??OverDue  ??? ? ? ?Pneumococcal Vaccine due??and every?  ??? ? ? ?Bone Density Screening due??07/16/15??and every 2??year(s)  ??? ? ? ?Diabetes Maintenance-Microalbumin due??03/07/18??and every 1??year(s)  ??? ? ? ?Advance Directive due??01/01/19??and every 1??year(s)  ??? ? ? ?Functional Assessment due??01/01/19??and every 1??year(s)  ??? ??Due?  ??? ? ? ?Diabetes Maintenance-Eye Exam due??06/13/19??and every?  ??? ? ? ?Hypertension Management-Education  due??06/13/19??and every 1??year(s)  ??? ? ? ?Pneumococcal Vaccine due??06/13/19??Variable frequency  ??? ??Due In Future?  ??? ? ? ?Diabetes Maintenance-Fasting Lipid Profile not due until??07/03/19??and every 1??year(s)  ??? ? ? ?Diabetes Maintenance-Foot Exam not due until??09/13/19??and every 1??year(s)  ??? ? ? ?Aspirin Therapy for CVD Prevention not due until??09/13/19??and every 1??year(s)  ??? ? ? ?Alcohol Misuse Screening not due until??01/01/20??and every 1??year(s)  ??? ? ? ?Cognitive Screening not due until??01/01/20??and every 1??year(s)  ??? ? ? ?Fall Risk Assessment not due until??01/01/20??and every 1??year(s)  ??? ? ? ?Geriatric Depression Screening not due until??01/01/20??and every 1??year(s)  ??? ? ? ?Obesity Screening not due until??01/01/20??and every 1??year(s)  ??? ? ? ?Colorectal Screening ([facility]) not due until??02/20/20??and every 1??year(s)  ??? ? ? ?Diabetes Maintenance-Urine Dipstick not due until??02/22/20??and every 1??year(s)  ??? ? ? ?Diabetes Maintenance-Serum Creatinine not due until??02/22/20??and every 1??year(s)  ??? ? ? ?ADL Screening not due until??03/01/20??and every 1??year(s)  ??? ? ? ?Breast Cancer Screening ([facility] Well[facility]) not due until??03/28/20??and every 2??year(s)  ??? ? ? ?Diabetes Maintenance-HgbA1c not due until??05/28/20??and every 1??year(s)  ???Satisfied?(in the past 1 year)  ??? ??Satisfied?  ??? ? ? ?ADL Screening on??03/01/19.??Satisfied by Gissel Miner  ??? ? ? ?Alcohol Misuse Screening on??02/13/19.??Satisfied by Roslyn Vogel RN  ??? ? ? ?Aspirin Therapy for CVD Prevention on??09/13/18.??Satisfied by Michael Hannah MD  ??? ? ? ?Blood Pressure Screening on??06/13/19.??Satisfied by Kiesha Walker  ??? ? ? ?Body Mass Index Check on??06/13/19.??Satisfied by Kiesha Walker  ??? ? ? ?Cognitive Screening on??03/01/19.??Satisfied by Gissel Miner  ??? ? ? ?Colorectal Screening (Sen[facility] Well[facility]) on??02/20/19.??Satisfied by Sylvie  Dottie Pandya  ??? ? ? ?Depression Screening on??03/01/19.??Satisfied by Gissel Miner  ??? ? ? ?Diabetes Maintenance-HgbA1c on??05/29/19.??Satisfied by Omaira Kwan  ??? ? ? ?Diabetes Screening on??05/29/19.??Satisfied by Omaira Kwan  ??? ? ? ?Fall Risk Assessment on??03/01/19.??Satisfied by Gissel Miner  ??? ? ? ?Geriatric Depression Screening on??03/01/19.??Satisfied by Gissel Miner  ??? ? ? ?Hypertension Management-Blood Pressure on??06/13/19.??Satisfied by Kiesha Walker  ??? ? ? ?Influenza Vaccine on??10/29/18.??Satisfied by Gissel Miner  ??? ? ? ?Lipid Screening on??07/03/18.??Satisfied by Yasmin Leal  ??? ? ? ?Obesity Screening on??06/13/19.??Satisfied by Kiesha Walker  ?  ?  Diagnostic Results  AP lateral bilateral knees reviewed. ?Patient with end-stage arthritis to patellofemoral joint as well as significant joint space narrowing and osteophyte formation medial lateral joint lines to both knees.

## 2022-05-20 RX ORDER — DICLOFENAC SODIUM 10 MG/G
GEL TOPICAL
COMMUNITY
Start: 2021-10-07 | End: 2022-06-03 | Stop reason: SDUPTHER

## 2022-05-20 RX ORDER — FUROSEMIDE 20 MG/1
TABLET ORAL
COMMUNITY
Start: 2021-12-28 | End: 2022-10-03 | Stop reason: DRUGHIGH

## 2022-05-20 RX ORDER — FAMOTIDINE 20 MG/1
TABLET, FILM COATED ORAL
COMMUNITY
Start: 2021-08-23 | End: 2022-12-12 | Stop reason: SDUPTHER

## 2022-05-20 RX ORDER — ISOSORBIDE MONONITRATE 60 MG/1
60 TABLET, EXTENDED RELEASE ORAL
COMMUNITY
Start: 2021-08-23

## 2022-05-20 RX ORDER — INSULIN GLARGINE 100 [IU]/ML
53 INJECTION, SOLUTION SUBCUTANEOUS NIGHTLY
Qty: 10 ML | Refills: 2 | Status: SHIPPED | OUTPATIENT
Start: 2022-05-20 | End: 2022-07-16

## 2022-05-20 RX ORDER — HYDRALAZINE HYDROCHLORIDE 100 MG/1
100 TABLET, FILM COATED ORAL 3 TIMES DAILY
COMMUNITY
Start: 2021-08-23 | End: 2024-03-28

## 2022-05-20 RX ORDER — GLIMEPIRIDE 4 MG/1
4 TABLET ORAL DAILY
COMMUNITY
Start: 2021-08-23 | End: 2023-03-27 | Stop reason: SDUPTHER

## 2022-05-20 RX ORDER — INSULIN GLARGINE 100 [IU]/ML
INJECTION, SOLUTION SUBCUTANEOUS
COMMUNITY
Start: 2021-08-23 | End: 2022-09-02 | Stop reason: SDUPTHER

## 2022-05-20 RX ORDER — LORATADINE 10 MG/1
10 TABLET ORAL
COMMUNITY
Start: 2021-08-23 | End: 2022-10-03

## 2022-05-20 RX ORDER — DULAGLUTIDE 1.5 MG/.5ML
INJECTION, SOLUTION SUBCUTANEOUS
COMMUNITY
Start: 2021-08-23 | End: 2022-09-02 | Stop reason: SDUPTHER

## 2022-05-20 RX ORDER — FERROUS SULFATE 325(65) MG
TABLET ORAL
COMMUNITY
Start: 2021-08-23 | End: 2022-05-30 | Stop reason: SDUPTHER

## 2022-05-20 RX ORDER — FLUTICASONE PROPIONATE 50 MCG
SPRAY, SUSPENSION (ML) NASAL
COMMUNITY
Start: 2021-10-28 | End: 2023-12-04 | Stop reason: SDUPTHER

## 2022-05-20 RX ORDER — PANTOPRAZOLE SODIUM 40 MG/1
40 TABLET, DELAYED RELEASE ORAL
COMMUNITY
Start: 2021-08-23 | End: 2022-05-30 | Stop reason: SDUPTHER

## 2022-05-30 RX ORDER — PEN NEEDLE, DIABETIC 30 GX3/16"
NEEDLE, DISPOSABLE MISCELLANEOUS
Qty: 90 EACH | Refills: 3 | Status: SHIPPED | OUTPATIENT
Start: 2022-05-30 | End: 2023-05-09 | Stop reason: SDUPTHER

## 2022-05-30 RX ORDER — PEN NEEDLE, DIABETIC 30 GX3/16"
NEEDLE, DISPOSABLE MISCELLANEOUS
COMMUNITY
Start: 2022-03-02 | End: 2022-05-30 | Stop reason: SDUPTHER

## 2022-05-30 RX ORDER — PANTOPRAZOLE SODIUM 40 MG/1
40 TABLET, DELAYED RELEASE ORAL DAILY
Qty: 30 TABLET | Refills: 3 | Status: SHIPPED | OUTPATIENT
Start: 2022-05-30 | End: 2022-09-29 | Stop reason: SDUPTHER

## 2022-05-30 RX ORDER — FERROUS SULFATE 325(65) MG
TABLET ORAL
Qty: 90 TABLET | Refills: 1 | Status: SHIPPED | OUTPATIENT
Start: 2022-05-30 | End: 2023-12-04 | Stop reason: SDUPTHER

## 2022-06-03 RX ORDER — DICLOFENAC SODIUM 10 MG/G
GEL TOPICAL
Qty: 50 G | Refills: 2 | Status: SHIPPED | OUTPATIENT
Start: 2022-06-03 | End: 2022-09-29 | Stop reason: SDUPTHER

## 2022-09-02 ENCOUNTER — OFFICE VISIT (OUTPATIENT)
Dept: FAMILY MEDICINE | Facility: CLINIC | Age: 67
End: 2022-09-02
Payer: MEDICARE

## 2022-09-02 VITALS
DIASTOLIC BLOOD PRESSURE: 73 MMHG | OXYGEN SATURATION: 98 % | TEMPERATURE: 98 F | SYSTOLIC BLOOD PRESSURE: 146 MMHG | BODY MASS INDEX: 43.3 KG/M2 | HEART RATE: 80 BPM | RESPIRATION RATE: 18 BRPM | HEIGHT: 68 IN | WEIGHT: 285.69 LBS

## 2022-09-02 DIAGNOSIS — N18.9 ANEMIA DUE TO CHRONIC KIDNEY DISEASE, UNSPECIFIED CKD STAGE: ICD-10-CM

## 2022-09-02 DIAGNOSIS — E11.22 TYPE 2 DIABETES MELLITUS WITH CHRONIC KIDNEY DISEASE, WITH LONG-TERM CURRENT USE OF INSULIN, UNSPECIFIED CKD STAGE: Primary | ICD-10-CM

## 2022-09-02 DIAGNOSIS — L60.2 THICKENED NAILS: ICD-10-CM

## 2022-09-02 DIAGNOSIS — I10 HYPERTENSION, UNSPECIFIED TYPE: ICD-10-CM

## 2022-09-02 DIAGNOSIS — Z79.4 TYPE 2 DIABETES MELLITUS WITH CHRONIC KIDNEY DISEASE, WITH LONG-TERM CURRENT USE OF INSULIN, UNSPECIFIED CKD STAGE: Primary | ICD-10-CM

## 2022-09-02 DIAGNOSIS — D63.1 ANEMIA DUE TO CHRONIC KIDNEY DISEASE, UNSPECIFIED CKD STAGE: ICD-10-CM

## 2022-09-02 PROBLEM — M10.9 GOUT: Status: ACTIVE | Noted: 2019-12-31

## 2022-09-02 PROBLEM — E11.9 TYPE 2 DIABETES MELLITUS: Status: ACTIVE | Noted: 2022-09-02

## 2022-09-02 PROBLEM — M54.50 LOW BACK PAIN: Status: ACTIVE | Noted: 2022-09-02

## 2022-09-02 PROBLEM — E78.2 MIXED HYPERLIPIDEMIA: Status: ACTIVE | Noted: 2019-12-31

## 2022-09-02 PROBLEM — E66.01 MORBID OBESITY WITH BMI OF 45.0-49.9, ADULT: Status: ACTIVE | Noted: 2019-12-31

## 2022-09-02 PROBLEM — K21.9 GASTROESOPHAGEAL REFLUX DISEASE: Status: ACTIVE | Noted: 2022-09-02

## 2022-09-02 LAB
CREAT UR-MCNC: 219.2 MG/DL (ref 47–110)
MICROALBUMIN UR-MCNC: 8 UG/ML
MICROALBUMIN/CREAT RATIO PNL UR: 3.6 MG/GM CR (ref 0–30)

## 2022-09-02 PROCEDURE — 82043 UR ALBUMIN QUANTITATIVE: CPT

## 2022-09-02 PROCEDURE — 99215 OFFICE O/P EST HI 40 MIN: CPT | Mod: PBBFAC

## 2022-09-02 RX ORDER — CARVEDILOL 25 MG/1
25 TABLET ORAL 2 TIMES DAILY
COMMUNITY
Start: 2022-08-02

## 2022-09-02 RX ORDER — GABAPENTIN 300 MG/1
300 CAPSULE ORAL 2 TIMES DAILY
COMMUNITY
Start: 2022-06-27 | End: 2023-09-14 | Stop reason: SDUPTHER

## 2022-09-02 RX ORDER — SPIRONOLACTONE 50 MG/1
50 TABLET, FILM COATED ORAL
COMMUNITY
End: 2022-10-05

## 2022-09-02 RX ORDER — ASPIRIN 81 MG/1
81 TABLET ORAL
COMMUNITY
End: 2023-12-04 | Stop reason: SDUPTHER

## 2022-09-02 RX ORDER — BIMATOPROST 0.1 MG/ML
1 SOLUTION/ DROPS OPHTHALMIC NIGHTLY
COMMUNITY
Start: 2022-07-06

## 2022-09-02 RX ORDER — INSULIN GLARGINE 100 [IU]/ML
INJECTION, SOLUTION SUBCUTANEOUS
Status: CANCELLED | OUTPATIENT
Start: 2022-09-02

## 2022-09-02 RX ORDER — LOSARTAN POTASSIUM 100 MG/1
100 TABLET ORAL DAILY
COMMUNITY
Start: 2022-07-17 | End: 2023-10-09 | Stop reason: SDUPTHER

## 2022-09-02 RX ORDER — CETIRIZINE HYDROCHLORIDE 10 MG/1
10 TABLET ORAL NIGHTLY
COMMUNITY
Start: 2022-03-23

## 2022-09-02 RX ORDER — ZOLPIDEM TARTRATE 10 MG/1
10 TABLET ORAL NIGHTLY
COMMUNITY
Start: 2022-07-08 | End: 2022-09-02

## 2022-09-02 RX ORDER — INSULIN GLARGINE 100 [IU]/ML
INJECTION, SOLUTION SUBCUTANEOUS
COMMUNITY
Start: 2022-06-05 | End: 2022-09-02 | Stop reason: SDUPTHER

## 2022-09-02 RX ORDER — INSULIN GLARGINE 100 [IU]/ML
53 INJECTION, SOLUTION SUBCUTANEOUS NIGHTLY
Qty: 20 ML | Refills: 11 | Status: SHIPPED | OUTPATIENT
Start: 2022-09-02 | End: 2023-03-27 | Stop reason: SDUPTHER

## 2022-09-02 RX ORDER — AMLODIPINE BESYLATE 10 MG/1
10 TABLET ORAL DAILY
COMMUNITY
Start: 2022-08-02

## 2022-09-02 RX ORDER — ROSUVASTATIN CALCIUM 20 MG/1
20 TABLET, COATED ORAL DAILY
COMMUNITY
Start: 2022-08-02 | End: 2023-12-04 | Stop reason: SDUPTHER

## 2022-09-02 RX ORDER — INSULIN GLARGINE 100 [IU]/ML
53 INJECTION, SOLUTION SUBCUTANEOUS NIGHTLY
Qty: 10 ML | Refills: 5 | Status: SHIPPED | OUTPATIENT
Start: 2022-09-02 | End: 2022-09-02 | Stop reason: SDUPTHER

## 2022-09-02 NOTE — PROGRESS NOTES
I have seen the patient, reviewed the Resident's history and physical. I have personally interviewed and examined the patient at bedside and: agree with the findings.     Gen: NAD, obese BF  Skin: vitiligo

## 2022-09-02 NOTE — PROGRESS NOTES
"[facility] OFFICE VISIT NOTE  Ivania Hatch  [id number]  09/02/2022      Chief Complaint: Hypertension      Ivania Hatch is a 66 y.o. female  presenting to [facility] for htn, dm, hld follow up.    HTN: reports measurements 130-150 at home. Rarely sees systolic 170. Reviewed medications with patient when importing into Epic. Patient reports taking all medications as prescribed. No lightheadedness on standing. No headaches, SOB. Leg swelling improved with compression stockings. Currently under care of Dr. Lopes for CKD and medication resistant HTN. Appt on 9/14/22    DM: Taking oral medications and trulicity. Out of insulin, pending refill. Last labs >3 months ago. Denies hypoglycemia symptoms.     Joint swelling of left ankle 2/2 hx of fracture. Chronically swollen       Review of Systems   Constitutional:  Negative for chills and fever.   HENT:  Negative for sore throat.    Respiratory:  Negative for cough and shortness of breath.    Cardiovascular:  Negative for chest pain.   Gastrointestinal:  Negative for abdominal pain.   Musculoskeletal:  Positive for joint swelling.   Neurological:  Negative for dizziness, syncope, weakness, light-headedness and headaches.     Blood pressure (!) 146/73, pulse 80, temperature 98.1 °F (36.7 °C), temperature source Oral, resp. rate 18, height 5' 8" (1.727 m), weight 129.6 kg (285 lb 11.5 oz), SpO2 98 %.   Physical Exam   Constitutional: She is oriented to person, place, and time.   HENT:   Head: Normocephalic.   Eyes: Pupils are equal, round, and reactive to light. Conjunctivae are normal.   Cardiovascular: Normal rate, regular rhythm and normal heart sounds.   Pulses:       Dorsalis pedis pulses are 1+ on the right side and 1+ on the left side.   Pulmonary/Chest: Effort normal and breath sounds normal.   Musculoskeletal:      Right foot: No deformity.      Left foot: Normal range of motion. Swelling present. No tenderness, bony tenderness or crepitus. Normal pulse.      Comments: " Isolated, nonpitting swelling of left ankle   Feet:   Right Foot:   Protective Sensation: 5 sites tested.  5 sites sensed.   Skin Integrity: Negative for ulcer, skin breakdown, erythema or warmth.   Left Foot:   Protective Sensation: 5 sites tested.  5 sites sensed.   Skin Integrity: Negative for ulcer, skin breakdown, erythema or warmth.   Neurological: She is alert and oriented to person, place, and time.   Skin: Capillary refill takes less than 2 seconds.   Hypopigmented papules and patches indicative of vitiligo over entire body      Current Medications:   Current Outpatient Medications   Medication Sig Dispense Refill    amLODIPine (NORVASC) 10 MG tablet Take 10 mg by mouth once daily.      aspirin (ECOTRIN) 81 MG EC tablet Take 81 mg by mouth.      carvediloL (COREG) 25 MG tablet Take 25 mg by mouth 2 (two) times daily.      cetirizine (ZYRTEC) 10 MG tablet Take 10 mg by mouth every evening.      diclofenac sodium (VOLTAREN) 1 % Gel See Instructions, APPLY 1 THIN LAYER TOPICALLY 4 TIMES DAILY AS NEEDED FOR PAIN /  NOT  TO  EXCEED  16GRAMS/DAY  SINGLE  JOINT  OF  LOWER  EXTREMITIES, # 300 gm, 1 Refill(s), Pharmacy: [facility], 175, cm, Height/Length Dosing, 08/23/21 10:4... 50 g 2    dulaglutide (TRULICITY) 1.5 mg/0.5 mL pen injector Inject 1.5 mg into the skin every 7 days. 4 pen 2    famotidine (PEPCID) 20 MG tablet   See Instructions, Take 1 tablet by mouth twice daily, # 180 tab(s), 3 Refill(s), Pharmacy: [facility], 175, cm, Height/Length Dosing, 08/23/21 10:41:00 CDT, 126, kg, Weight Dosing, 08/23/21 10:41:00 CDT      ferrous sulfate (FEOSOL) 325 mg (65 mg iron) Tab tablet See Instructions, Take 1 tablet by mouth once daily for 90 days, # 90 tab(s), 3 Refill(s), Pharmacy: [facility], 175, cm, Height/Length Dosing, 08/23/21 10:41:00 CDT, 126, kg, Weight Dosing, 08/23/21 10:41:00 CDT 90 tablet 1    fluticasone propionate (FLONASE ALLERGY RELIEF) 50 mcg/actuation nasal spray by  "Nasal route.      furosemide (LASIX) 20 MG tablet   See Instructions, Take 1 tablet by mouth once daily, # 90 tab(s), 0 Refill(s), Pharmacy: [facility], 175, cm, Height/Length Dosing, 10/28/21 13:28:00 CDT, 124.8, kg, Weight Dosing, 10/28/21 13:28:00 CDT      gabapentin (NEURONTIN) 300 MG capsule Take 300 mg by mouth 2 (two) times daily.      glimepiride (AMARYL) 4 MG tablet Take 4 mg by mouth 2 (two) times a day.      hydrALAZINE (APRESOLINE) 100 MG tablet Take 100 mg by mouth 3 (three) times daily.      insulin glargine (LANTUS) 100 unit/mL injection Inject 53 Units into the skin every evening. 20 mL 11    isosorbide mononitrate (IMDUR) 60 MG 24 hr tablet Take 60 mg by mouth.      loratadine (CLARITIN) 10 mg tablet Take 10 mg by mouth.      losartan (COZAAR) 100 MG tablet Take 100 mg by mouth once daily.      LUMIGAN 0.01 % Drop Place 1 drop into both eyes every evening.      pantoprazole (PROTONIX) 40 MG tablet Take 1 tablet (40 mg total) by mouth once daily. 30 tablet 3    pen needle, diabetic 32 gauge x 5/32" Ndle USE AS DIRECTED ONCE DAILY 90 each 3    rosuvastatin (CRESTOR) 20 MG tablet Take 20 mg by mouth once daily.      spironolactone (ALDACTONE) 50 MG tablet Take 50 mg by mouth.       No current facility-administered medications for this visit.       Assessment:   1. Type 2 diabetes mellitus with chronic kidney disease, with long-term current use of insulin, unspecified CKD stage    2. Hypertension, unspecified type    3. Anemia due to chronic kidney disease, unspecified CKD stage    4. Thickened nails        Plan:  Refilled insulin, continue other labs   Maintenance labs ordered for HTN, anemia, HLD, DM  DFE today, referral to Podiatry for diabetic foot care  Bring BP log to next appointment     Return to clinic in 1 months with medications, or sooner if needed.     Marya Santos MD  [facility]   "

## 2022-09-07 ENCOUNTER — LAB VISIT (OUTPATIENT)
Dept: LAB | Facility: HOSPITAL | Age: 67
End: 2022-09-07
Attending: STUDENT IN AN ORGANIZED HEALTH CARE EDUCATION/TRAINING PROGRAM
Payer: MEDICARE

## 2022-09-07 DIAGNOSIS — I10 HYPERTENSION, UNSPECIFIED TYPE: ICD-10-CM

## 2022-09-07 DIAGNOSIS — Z79.4 TYPE 2 DIABETES MELLITUS WITH CHRONIC KIDNEY DISEASE, WITH LONG-TERM CURRENT USE OF INSULIN, UNSPECIFIED CKD STAGE: ICD-10-CM

## 2022-09-07 DIAGNOSIS — D63.1 ANEMIA DUE TO CHRONIC KIDNEY DISEASE, UNSPECIFIED CKD STAGE: ICD-10-CM

## 2022-09-07 DIAGNOSIS — N18.9 ANEMIA DUE TO CHRONIC KIDNEY DISEASE, UNSPECIFIED CKD STAGE: ICD-10-CM

## 2022-09-07 DIAGNOSIS — E11.22 TYPE 2 DIABETES MELLITUS WITH CHRONIC KIDNEY DISEASE, WITH LONG-TERM CURRENT USE OF INSULIN, UNSPECIFIED CKD STAGE: ICD-10-CM

## 2022-09-07 LAB
ALBUMIN SERPL-MCNC: 3.8 GM/DL (ref 3.4–4.8)
ALBUMIN/GLOB SERPL: 1.2 RATIO (ref 1.1–2)
ALP SERPL-CCNC: 97 UNIT/L (ref 40–150)
ALT SERPL-CCNC: 14 UNIT/L (ref 0–55)
AST SERPL-CCNC: 16 UNIT/L (ref 5–34)
BASOPHILS # BLD AUTO: 0.04 X10(3)/MCL (ref 0–0.2)
BASOPHILS NFR BLD AUTO: 0.5 %
BILIRUBIN DIRECT+TOT PNL SERPL-MCNC: 0.3 MG/DL
BUN SERPL-MCNC: 20.2 MG/DL (ref 9.8–20.1)
CALCIUM SERPL-MCNC: 10.1 MG/DL (ref 8.4–10.2)
CHLORIDE SERPL-SCNC: 107 MMOL/L (ref 98–107)
CHOLEST SERPL-MCNC: 129 MG/DL
CHOLEST/HDLC SERPL: 4 {RATIO} (ref 0–5)
CO2 SERPL-SCNC: 29 MMOL/L (ref 23–31)
CREAT SERPL-MCNC: 1.53 MG/DL (ref 0.55–1.02)
EOSINOPHIL # BLD AUTO: 0.44 X10(3)/MCL (ref 0–0.9)
EOSINOPHIL NFR BLD AUTO: 5.9 %
ERYTHROCYTE [DISTWIDTH] IN BLOOD BY AUTOMATED COUNT: 14.3 % (ref 11.5–17)
EST. AVERAGE GLUCOSE BLD GHB EST-MCNC: 159.9 MG/DL
GFR SERPLBLD CREATININE-BSD FMLA CKD-EPI: 37 MLS/MIN/1.73/M2
GLOBULIN SER-MCNC: 3.2 GM/DL (ref 2.4–3.5)
GLUCOSE SERPL-MCNC: 86 MG/DL (ref 82–115)
HBA1C MFR BLD: 7.2 %
HCT VFR BLD AUTO: 35.3 % (ref 37–47)
HDLC SERPL-MCNC: 30 MG/DL (ref 35–60)
HGB BLD-MCNC: 10.7 GM/DL (ref 12–16)
IMM GRANULOCYTES # BLD AUTO: 0.02 X10(3)/MCL (ref 0–0.04)
IMM GRANULOCYTES NFR BLD AUTO: 0.3 %
LDLC SERPL CALC-MCNC: 73 MG/DL (ref 50–140)
LYMPHOCYTES # BLD AUTO: 2.1 X10(3)/MCL (ref 0.6–4.6)
LYMPHOCYTES NFR BLD AUTO: 28.2 %
MCH RBC QN AUTO: 25.5 PG (ref 27–31)
MCHC RBC AUTO-ENTMCNC: 30.3 MG/DL (ref 33–36)
MCV RBC AUTO: 84 FL (ref 80–94)
MONOCYTES # BLD AUTO: 0.54 X10(3)/MCL (ref 0.1–1.3)
MONOCYTES NFR BLD AUTO: 7.2 %
NEUTROPHILS # BLD AUTO: 4.3 X10(3)/MCL (ref 2.1–9.2)
NEUTROPHILS NFR BLD AUTO: 57.9 %
NRBC BLD AUTO-RTO: 0 %
PLATELET # BLD AUTO: 240 X10(3)/MCL (ref 130–400)
PMV BLD AUTO: 11.7 FL (ref 7.4–10.4)
POTASSIUM SERPL-SCNC: 4.4 MMOL/L (ref 3.5–5.1)
PROT SERPL-MCNC: 7 GM/DL (ref 5.8–7.6)
RBC # BLD AUTO: 4.2 X10(6)/MCL (ref 4.2–5.4)
SODIUM SERPL-SCNC: 143 MMOL/L (ref 136–145)
TRIGL SERPL-MCNC: 130 MG/DL (ref 37–140)
VLDLC SERPL CALC-MCNC: 26 MG/DL
WBC # SPEC AUTO: 7.5 X10(3)/MCL (ref 4.5–11.5)

## 2022-09-07 PROCEDURE — 83036 HEMOGLOBIN GLYCOSYLATED A1C: CPT

## 2022-09-07 PROCEDURE — 80053 COMPREHEN METABOLIC PANEL: CPT

## 2022-09-07 PROCEDURE — 36415 COLL VENOUS BLD VENIPUNCTURE: CPT

## 2022-09-07 PROCEDURE — 85025 COMPLETE CBC W/AUTO DIFF WBC: CPT

## 2022-09-07 PROCEDURE — 84075 ASSAY ALKALINE PHOSPHATASE: CPT

## 2022-09-07 PROCEDURE — 80061 LIPID PANEL: CPT

## 2022-09-29 RX ORDER — DICLOFENAC SODIUM 10 MG/G
GEL TOPICAL
Qty: 50 G | Refills: 2 | Status: SHIPPED | OUTPATIENT
Start: 2022-09-29 | End: 2023-12-04 | Stop reason: SDUPTHER

## 2022-09-29 RX ORDER — PANTOPRAZOLE SODIUM 40 MG/1
40 TABLET, DELAYED RELEASE ORAL DAILY
Qty: 30 TABLET | Refills: 3 | Status: SHIPPED | OUTPATIENT
Start: 2022-09-29 | End: 2022-12-12 | Stop reason: SDUPTHER

## 2022-10-03 ENCOUNTER — OFFICE VISIT (OUTPATIENT)
Dept: FAMILY MEDICINE | Facility: CLINIC | Age: 67
End: 2022-10-03
Payer: MEDICARE

## 2022-10-03 VITALS
DIASTOLIC BLOOD PRESSURE: 72 MMHG | TEMPERATURE: 98 F | BODY MASS INDEX: 42.74 KG/M2 | WEIGHT: 282 LBS | SYSTOLIC BLOOD PRESSURE: 140 MMHG | HEIGHT: 68 IN | RESPIRATION RATE: 20 BRPM | OXYGEN SATURATION: 97 % | HEART RATE: 82 BPM

## 2022-10-03 DIAGNOSIS — Z12.31 BREAST CANCER SCREENING BY MAMMOGRAM: ICD-10-CM

## 2022-10-03 DIAGNOSIS — I10 ESSENTIAL HYPERTENSION: Primary | ICD-10-CM

## 2022-10-03 PROBLEM — I70.1 RENAL ARTERY STENOSIS: Status: ACTIVE | Noted: 2022-08-05

## 2022-10-03 PROBLEM — L51.3 SJS-TEN OVERLAP SYNDROME: Status: ACTIVE | Noted: 2019-12-31

## 2022-10-03 PROBLEM — I11.9 HYPERTENSIVE HEART DISEASE WITHOUT CONGESTIVE HEART FAILURE: Status: ACTIVE | Noted: 2022-08-05

## 2022-10-03 PROBLEM — G47.33 OBSTRUCTIVE SLEEP APNEA OF ADULT: Status: ACTIVE | Noted: 2022-08-05

## 2022-10-03 PROBLEM — I25.10 CORONARY ARTERIOSCLEROSIS: Status: ACTIVE | Noted: 2022-08-05

## 2022-10-03 PROCEDURE — 99215 OFFICE O/P EST HI 40 MIN: CPT | Mod: PBBFAC

## 2022-10-03 RX ORDER — CYANOCOBALAMIN (VITAMIN B-12) 2500 MCG
TABLET, SUBLINGUAL SUBLINGUAL
COMMUNITY
End: 2023-12-04

## 2022-10-03 RX ORDER — ASPIRIN 81 MG
TABLET, DELAYED RELEASE (ENTERIC COATED) ORAL
COMMUNITY

## 2022-10-03 RX ORDER — FUROSEMIDE 80 MG/1
80 TABLET ORAL EVERY OTHER DAY
COMMUNITY
Start: 2022-08-02 | End: 2024-02-19

## 2022-10-03 NOTE — PROGRESS NOTES
[facility] OFFICE VISIT NOTE  Ivania Hatch  [id number]  10/03/2022      Chief Complaint:  follow-up of hypertension      HPI    Ivania Hatch is a 66 y.o. female  presenting to [facility] for follow-up of hypertension.    HTN  -problem medications with her today  -compliant with medications amlodipine 10 mg daily, Coreg 25 mg b.i.d., Lasix 80 mg every other day, Imdur 60 mg daily, losartan 100 mg daily  -Unsure she is taking spironolactone 50 mg daily (not in her medication bag)  -follows Dr. Lopes for CKD and medication resistant HTN.  Had appt on 9/14/22, patient states that no medications were changed.  She was sent for sleep study and now has a CPAP machine that she sleeps at night.  -BP at home:  -denies chest pain, sob, headaches    ROS:  CONSTITUTIONAL: No weight loss, fever, chills, or weakness.    CARDIOVASCULAR: No chest pain, chest pressure, or chest discomfort. No palpitations.    RESPIRATORY: No shortness of breath, cough, or sputum.    GASTROINTESTINAL: No nausea, vomiting or diarrhea. No abdominal pain    Vitals:    10/03/22 1516   BP: (!) 140/72   Pulse: 82   Resp: 20   Temp: 98.1 °F (36.7 °C)       PE:  General: appears well, in no acute distress   Eye: no scleral icterus   HENT: MMM  Respiratory: clear to auscultation bilaterally, nonlabored respirations   Cardiovascular: regular rate and rhythm without murmurs or gallops, no edema in bilateral lower extremities       Current Medications:   Current Outpatient Medications   Medication Sig Dispense Refill    amLODIPine (NORVASC) 10 MG tablet Take 10 mg by mouth once daily.      aspirin (ECOTRIN) 81 MG EC tablet Take 81 mg by mouth.      biotin 5,000 mcg Subl biotin 5,000 mcg sublingual tablet   Place by sublingual route.      carvediloL (COREG) 25 MG tablet Take 25 mg by mouth 2 (two) times daily.      diclofenac sodium (VOLTAREN) 1 % Gel See Instructions, APPLY 1 THIN LAYER TOPICALLY 4 TIMES DAILY AS NEEDED FOR PAIN /  NOT  TO  EXCEED  16GRAMS/DAY   "SINGLE  JOINT  OF  LOWER  EXTREMITIES, # 300 gm, 1 Refill(s), Pharmacy: [facility], 175, cm, Height/Length Dosing, 08/23/21 10:4... 50 g 2    docusate sodium 100 mg capsule Stool Softener 100 mg tablet   Take 1 tablet every day by oral route.      dulaglutide (TRULICITY) 1.5 mg/0.5 mL pen injector Inject 1.5 mg into the skin every 7 days. 4 pen 2    famotidine (PEPCID) 20 MG tablet   See Instructions, Take 1 tablet by mouth twice daily, # 180 tab(s), 3 Refill(s), Pharmacy: [facility], 175, cm, Height/Length Dosing, 08/23/21 10:41:00 CDT, 126, kg, Weight Dosing, 08/23/21 10:41:00 CDT      ferrous sulfate (FEOSOL) 325 mg (65 mg iron) Tab tablet See Instructions, Take 1 tablet by mouth once daily for 90 days, # 90 tab(s), 3 Refill(s), Pharmacy: [facility], 175, cm, Height/Length Dosing, 08/23/21 10:41:00 CDT, 126, kg, Weight Dosing, 08/23/21 10:41:00 CDT 90 tablet 1    fluticasone propionate (FLONASE ALLERGY RELIEF) 50 mcg/actuation nasal spray by Nasal route.      furosemide (LASIX) 80 MG tablet Take 80 mg by mouth every other day.      gabapentin (NEURONTIN) 300 MG capsule Take 300 mg by mouth 2 (two) times daily.      glimepiride (AMARYL) 4 MG tablet Take 4 mg by mouth 2 (two) times a day.      hydrALAZINE (APRESOLINE) 100 MG tablet Take 100 mg by mouth 3 (three) times daily.      insulin glargine (LANTUS) 100 unit/mL injection Inject 53 Units into the skin every evening. 20 mL 11    isosorbide mononitrate (IMDUR) 60 MG 24 hr tablet Take 60 mg by mouth.      losartan (COZAAR) 100 MG tablet Take 100 mg by mouth once daily.      LUMIGAN 0.01 % Drop Place 1 drop into both eyes every evening.      pantoprazole (PROTONIX) 40 MG tablet Take 1 tablet (40 mg total) by mouth once daily. 30 tablet 3    pen needle, diabetic 32 gauge x 5/32" Ndle USE AS DIRECTED ONCE DAILY 90 each 3    rosuvastatin (CRESTOR) 20 MG tablet Take 20 mg by mouth once daily.      cetirizine (ZYRTEC) 10 MG tablet Take 10 " mg by mouth every evening.      spironolactone (ALDACTONE) 50 MG tablet Take 50 mg by mouth.       No current facility-administered medications for this visit.       Assessment:   1. Essential hypertension    2. Breast cancer screening by mammogram        Plan:    Hypertension  -Controlled.   -Continue amlodipine 10 mg daily, Coreg 25 mg b.i.d., Lasix 80 mg every other day, Imdur 60 mg daily, losartan 100 mg daily  -Unsure she is taking spironolactone 50 mg daily, patient to call clinic when she checks her medications at home.  -continue following Nephrology and Cardiology  -Low Sodium Diet (DASH Diet - Less than 2 grams of sodium per day).  -Monitor blood pressure daily and log. Report consistent numbers greater than 140/90.  -Maintain healthy weight with goal BMI <30. Exercise 30 minutes per day, 5 days per week.    Breast cancer screening   -Mammogram ordered today    Return to clinic in 3 months    Flor Kirk M.D.  [facility]

## 2022-10-04 NOTE — PROGRESS NOTES
I reviewed History, PE, A/P and chart was reviewed.  Services provided in a teaching facility, I was immediately available  I agree with resident, care reasonable and necessary.   Management discussed with resident at time of visit.    Agree with offering PAP or at least pelvic, last pap 2016 HPV negative      FH colon cancer -please have and document risk/benefit discussion on colonoscopy on FU, FIT 1/2022 not adequate screening due to FH and high risk status  if desires screening scope and insurance covers, may have to wait until the year passes    Eye exam due      Maday Bustamante MD  [facility] [place]  [facility]

## 2022-10-05 NOTE — PROGRESS NOTES
Spoke to patient.  She is no longer taking Spironolactone.   I will take it off of her medication list.     Flor Kirk M.D.  [facility]

## 2022-10-11 ENCOUNTER — DOCUMENTATION ONLY (OUTPATIENT)
Dept: PRIMARY CARE CLINIC | Facility: CLINIC | Age: 67
End: 2022-10-11
Payer: MEDICARE

## 2022-10-21 ENCOUNTER — HOSPITAL ENCOUNTER (OUTPATIENT)
Dept: RADIOLOGY | Facility: HOSPITAL | Age: 67
Discharge: HOME | End: 2022-10-21
Attending: STUDENT IN AN ORGANIZED HEALTH CARE EDUCATION/TRAINING PROGRAM
Payer: MEDICARE

## 2022-10-21 DIAGNOSIS — Z12.31 BREAST CANCER SCREENING BY MAMMOGRAM: ICD-10-CM

## 2022-10-21 PROCEDURE — 77067 SCR MAMMO BI INCL CAD: CPT | Mod: TC

## 2022-10-21 PROCEDURE — 77063 BREAST TOMOSYNTHESIS BI: CPT | Mod: 26,,, | Performed by: RADIOLOGY

## 2022-10-21 PROCEDURE — 77063 MAMMO DIGITAL SCREENING BILAT WITH TOMO: ICD-10-PCS | Mod: 26,,, | Performed by: RADIOLOGY

## 2022-10-21 PROCEDURE — 77067 SCR MAMMO BI INCL CAD: CPT | Mod: 26,,, | Performed by: RADIOLOGY

## 2022-10-21 PROCEDURE — 77067 MAMMO DIGITAL SCREENING BILAT WITH TOMO: ICD-10-PCS | Mod: 26,,, | Performed by: RADIOLOGY

## 2022-12-12 ENCOUNTER — TELEPHONE (OUTPATIENT)
Dept: FAMILY MEDICINE | Facility: CLINIC | Age: 67
End: 2022-12-12
Payer: MEDICARE

## 2022-12-12 DIAGNOSIS — E11.9 TYPE 2 DIABETES MELLITUS WITHOUT COMPLICATION, WITH LONG-TERM CURRENT USE OF INSULIN: Primary | ICD-10-CM

## 2022-12-12 DIAGNOSIS — K21.9 GASTROESOPHAGEAL REFLUX DISEASE, UNSPECIFIED WHETHER ESOPHAGITIS PRESENT: Primary | ICD-10-CM

## 2022-12-12 DIAGNOSIS — Z79.4 TYPE 2 DIABETES MELLITUS WITHOUT COMPLICATION, WITH LONG-TERM CURRENT USE OF INSULIN: Primary | ICD-10-CM

## 2022-12-12 RX ORDER — FAMOTIDINE 20 MG/1
TABLET, FILM COATED ORAL
Qty: 180 TABLET | Refills: 0 | Status: SHIPPED | OUTPATIENT
Start: 2022-12-12 | End: 2023-06-27

## 2022-12-12 RX ORDER — DULAGLUTIDE 1.5 MG/.5ML
1.5 INJECTION, SOLUTION SUBCUTANEOUS
Qty: 4 PEN | Refills: 3 | Status: SHIPPED | OUTPATIENT
Start: 2022-12-12 | End: 2023-03-27 | Stop reason: SDUPTHER

## 2022-12-12 RX ORDER — PANTOPRAZOLE SODIUM 40 MG/1
40 TABLET, DELAYED RELEASE ORAL DAILY
Qty: 90 TABLET | Refills: 0 | Status: SHIPPED | OUTPATIENT
Start: 2022-12-12 | End: 2023-03-27 | Stop reason: SDUPTHER

## 2022-12-12 NOTE — TELEPHONE ENCOUNTER
Pharmacy requesting refill on Trulicity. Rx not listed on current med list in Epic, but is listed in last OV note. Please send a new Rx if pt is to conitinue on this therapy or advise if Rx has been d/c. Thank you!

## 2023-01-06 ENCOUNTER — OFFICE VISIT (OUTPATIENT)
Dept: FAMILY MEDICINE | Facility: CLINIC | Age: 68
End: 2023-01-06
Payer: MEDICARE

## 2023-01-06 VITALS
HEIGHT: 68 IN | DIASTOLIC BLOOD PRESSURE: 62 MMHG | SYSTOLIC BLOOD PRESSURE: 142 MMHG | HEART RATE: 80 BPM | OXYGEN SATURATION: 97 % | TEMPERATURE: 98 F | BODY MASS INDEX: 41.98 KG/M2 | WEIGHT: 277 LBS | RESPIRATION RATE: 20 BRPM

## 2023-01-06 DIAGNOSIS — B35.9 DERMATOPHYTOSIS: ICD-10-CM

## 2023-01-06 DIAGNOSIS — Z79.4 TYPE 2 DIABETES MELLITUS WITHOUT COMPLICATION, WITH LONG-TERM CURRENT USE OF INSULIN: Primary | ICD-10-CM

## 2023-01-06 DIAGNOSIS — E11.9 TYPE 2 DIABETES MELLITUS WITHOUT COMPLICATION, WITH LONG-TERM CURRENT USE OF INSULIN: Primary | ICD-10-CM

## 2023-01-06 PROCEDURE — 99215 OFFICE O/P EST HI 40 MIN: CPT | Mod: PBBFAC

## 2023-01-06 RX ORDER — CLOTRIMAZOLE 1 %
CREAM (GRAM) TOPICAL 2 TIMES DAILY
Qty: 24 G | Refills: 2 | Status: SHIPPED | OUTPATIENT
Start: 2023-01-06

## 2023-01-06 NOTE — PROGRESS NOTES
[facility] OFFICE VISIT NOTE  Ivania Hatch  [id number]  01/06/2023      Chief Complaint: Follow-up (Routine follow up. )      HPI    Ivania Hatch is a 67 y.o. female  presenting to [facility] for follow up of diabetes.      DM  -A1c 7.2 on 9/7/22.  Had apt with nephrology 12/14/22 with A1c 6.7.  (paper scanned into chart)  -has been having hypoglycemic episodes that wake her up from her sleep (sweating and weakness). Checks CBG at that time and it is around 50.    -saw nephrology on 12/14 and he increased her glimepiride from 4mg bid to 8 mg bid.  Since that medication increase she has been having episodes of hypoglycemia.     Complaints of rash on her R breast  -noticed it a week ago.  -looks like a ring worm to her.  -tried over the counter cream for athletes feet with improvement in lesion but it did not resole.  -denies any breast tenderness or nipple discharge, no new lumps or bumps       ROS:  CONSTITUTIONAL: No weight loss, fever, chills, or weakness.    CARDIOVASCULAR: No chest pain, chest pressure, or chest discomfort. No palpitations.    RESPIRATORY: No shortness of breath, cough, or sputum.    GASTROINTESTINAL: No nausea, vomiting or diarrhea. No abdominal pain.    Vitals:    01/06/23 1318   BP: (!) 142/62   Pulse: 80   Resp: 20   Temp: 98.3 °F (36.8 °C)       PE:  General: appears well, in no acute distress   Eye: no scleral icterus   HENT: MMM  Respiratory: clear to auscultation bilaterally, nonlabored respirations   Cardiovascular: regular rate and rhythm without murmurs or gallops, no edema in bilateral lower extremities   Integumentary: bilateral chronic erythema 1 in circumference sounding both nipples.  R breast 2 o'clock position with raised erythematous circular lesion.      Current Medications:   Current Outpatient Medications   Medication Sig Dispense Refill    amLODIPine (NORVASC) 10 MG tablet Take 10 mg by mouth once daily.      aspirin (ECOTRIN) 81 MG EC tablet Take 81 mg by mouth.      biotin  5,000 mcg Subl biotin 5,000 mcg sublingual tablet   Place by sublingual route.      carvediloL (COREG) 25 MG tablet Take 25 mg by mouth 2 (two) times daily.      cetirizine (ZYRTEC) 10 MG tablet Take 10 mg by mouth every evening.      diclofenac sodium (VOLTAREN) 1 % Gel See Instructions, APPLY 1 THIN LAYER TOPICALLY 4 TIMES DAILY AS NEEDED FOR PAIN /  NOT  TO  EXCEED  16GRAMS/DAY  SINGLE  JOINT  OF  LOWER  EXTREMITIES, # 300 gm, 1 Refill(s), Pharmacy: [facility], 175, cm, Height/Length Dosing, 08/23/21 10:4... 50 g 2    docusate sodium 100 mg capsule Stool Softener 100 mg tablet   Take 1 tablet every day by oral route.      dulaglutide (TRULICITY) 1.5 mg/0.5 mL pen injector Inject 1.5 mg into the skin every 7 days. 4 pen 3    famotidine (PEPCID) 20 MG tablet See Instructions, Take 1 tablet by mouth twice daily, # 180 tab(s), 3 Refill(s), Pharmacy: [facility], 175, cm, Height/Length Dosing, 08/23/21 10:41:00 CDT, 126, kg, Weight Dosing, 08/23/21 10:41:00  tablet 0    ferrous sulfate (FEOSOL) 325 mg (65 mg iron) Tab tablet See Instructions, Take 1 tablet by mouth once daily for 90 days, # 90 tab(s), 3 Refill(s), Pharmacy: [facility], 175, cm, Height/Length Dosing, 08/23/21 10:41:00 CDT, 126, kg, Weight Dosing, 08/23/21 10:41:00 CDT 90 tablet 1    fluticasone propionate (FLONASE) 50 mcg/actuation nasal spray by Nasal route.      furosemide (LASIX) 80 MG tablet Take 80 mg by mouth every other day.      gabapentin (NEURONTIN) 300 MG capsule Take 300 mg by mouth 2 (two) times daily.      glimepiride (AMARYL) 4 MG tablet Take 4 mg by mouth once daily.      hydrALAZINE (APRESOLINE) 100 MG tablet Take 100 mg by mouth 3 (three) times daily.      insulin glargine (LANTUS) 100 unit/mL injection Inject 53 Units into the skin every evening. 20 mL 11    isosorbide mononitrate (IMDUR) 60 MG 24 hr tablet Take 60 mg by mouth.      losartan (COZAAR) 100 MG tablet Take 100 mg by mouth once daily.    "   LUMIGAN 0.01 % Drop Place 1 drop into both eyes every evening.      pantoprazole (PROTONIX) 40 MG tablet Take 1 tablet (40 mg total) by mouth once daily. 90 tablet 0    pen needle, diabetic 32 gauge x 5/32" Ndle USE AS DIRECTED ONCE DAILY 90 each 3    rosuvastatin (CRESTOR) 20 MG tablet Take 20 mg by mouth once daily.      clotrimazole (LOTRIMIN) 1 % cream Apply topically 2 (two) times daily. 24 g 2     No current facility-administered medications for this visit.       Assessment:   1. Type 2 diabetes mellitus without complication, with long-term current use of insulin    2. Dermatophytosis        Plan:    Diabetes Type 2  -Continue Trulicity 1.5 mg, 53 U Lantus QHS  -decrease Glimepiride to 4mg qd (1 tablet daily)  -On ACE and Statin according to guidelines.  -Follow ADA Diet. Avoid soda, simple sweets, and limit rice/pasta/breads/starches.  -Maintain healthy weight with goal BMI <30.  Exercise 5 times per week for 30 minutes per day.  -Last microalbumin/creatine ratio: 9/2/22  -Last diabetic foot exam: 9/2/22  -Last diabetic eye exam: Follows [facility] clinic, release of records signed today     Dermatophytosis of R breast  -start Lotrimin cream     Return to clinic in 2 months    Flor Kirk M.D.  [facility]    "

## 2023-03-10 ENCOUNTER — OFFICE VISIT (OUTPATIENT)
Dept: FAMILY MEDICINE | Facility: CLINIC | Age: 68
End: 2023-03-10
Payer: MEDICARE

## 2023-03-10 VITALS
TEMPERATURE: 99 F | RESPIRATION RATE: 18 BRPM | WEIGHT: 282.63 LBS | OXYGEN SATURATION: 98 % | SYSTOLIC BLOOD PRESSURE: 127 MMHG | BODY MASS INDEX: 42.83 KG/M2 | HEART RATE: 81 BPM | DIASTOLIC BLOOD PRESSURE: 67 MMHG | HEIGHT: 68 IN

## 2023-03-10 DIAGNOSIS — E11.9 TYPE 2 DIABETES MELLITUS WITHOUT COMPLICATION, WITH LONG-TERM CURRENT USE OF INSULIN: Primary | ICD-10-CM

## 2023-03-10 DIAGNOSIS — Z79.4 TYPE 2 DIABETES MELLITUS WITHOUT COMPLICATION, WITH LONG-TERM CURRENT USE OF INSULIN: Primary | ICD-10-CM

## 2023-03-10 LAB — HBA1C MFR BLD: NORMAL %

## 2023-03-10 PROCEDURE — 83036 HEMOGLOBIN GLYCOSYLATED A1C: CPT | Mod: PBBFAC

## 2023-03-10 PROCEDURE — 99215 OFFICE O/P EST HI 40 MIN: CPT | Mod: PBBFAC

## 2023-03-10 NOTE — PROGRESS NOTES
[facility] OFFICE VISIT NOTE  Ivania Hatch  [id number]  03/10/2023      Chief Complaint: Hypertension, Medication Refill, and Diabetes      HPI    Ivania Hatch is a 67 y.o. female  presenting to [facility] for DM2.      -last visit glimepiride was decreased from 8mg to 4 mg qd due to episodes of hypoglycemia   -continued Trulicity 1.5 mg and 53 U Lantus QHS  -fasting CBG's around 120's  -no longer waking up at 2am with dizziness.  Feeling much better       ROS:  CONSTITUTIONAL: No weight loss, fever, chills, or weakness.    CARDIOVASCULAR: No chest pain, chest pressure, or chest discomfort. No palpitations.    RESPIRATORY: No shortness of breath, cough, or sputum.    GASTROINTESTINAL: No nausea, vomiting or diarrhea. No abdominal pain,    Vitals:    03/10/23 1327   BP: 127/67   Pulse: 81   Resp: 18   Temp: 98.5 °F (36.9 °C)       PE:  General: appears well, in no acute distress   Eye: no scleral icterus   HENT: MMM  Respiratory: clear to auscultation bilaterally, nonlabored respirations   Cardiovascular: regular rate and rhythm without murmurs or gallops, no edema in bilateral lower extremities       Current Medications:   Current Outpatient Medications   Medication Sig Dispense Refill    amLODIPine (NORVASC) 10 MG tablet Take 10 mg by mouth once daily.      aspirin (ECOTRIN) 81 MG EC tablet Take 81 mg by mouth.      biotin 5,000 mcg Subl biotin 5,000 mcg sublingual tablet   Place by sublingual route.      carvediloL (COREG) 25 MG tablet Take 25 mg by mouth 2 (two) times daily.      cetirizine (ZYRTEC) 10 MG tablet Take 10 mg by mouth every evening.      clotrimazole (LOTRIMIN) 1 % cream Apply topically 2 (two) times daily. 24 g 2    diclofenac sodium (VOLTAREN) 1 % Gel See Instructions, APPLY 1 THIN LAYER TOPICALLY 4 TIMES DAILY AS NEEDED FOR PAIN /  NOT  TO  EXCEED  16GRAMS/DAY  SINGLE  JOINT  OF  LOWER  EXTREMITIES, # 300 gm, 1 Refill(s), Pharmacy: [facility], 175, cm, Height/Length Dosing, 08/23/21 10:4...  "50 g 2    docusate sodium 100 mg capsule Stool Softener 100 mg tablet   Take 1 tablet every day by oral route.      dulaglutide (TRULICITY) 1.5 mg/0.5 mL pen injector Inject 1.5 mg into the skin every 7 days. 4 pen 3    famotidine (PEPCID) 20 MG tablet See Instructions, Take 1 tablet by mouth twice daily, # 180 tab(s), 3 Refill(s), Pharmacy: [facility], 175, cm, Height/Length Dosing, 08/23/21 10:41:00 CDT, 126, kg, Weight Dosing, 08/23/21 10:41:00  tablet 0    ferrous sulfate (FEOSOL) 325 mg (65 mg iron) Tab tablet See Instructions, Take 1 tablet by mouth once daily for 90 days, # 90 tab(s), 3 Refill(s), Pharmacy: [facility], 175, cm, Height/Length Dosing, 08/23/21 10:41:00 CDT, 126, kg, Weight Dosing, 08/23/21 10:41:00 CDT 90 tablet 1    fluticasone propionate (FLONASE) 50 mcg/actuation nasal spray by Nasal route.      furosemide (LASIX) 80 MG tablet Take 80 mg by mouth every other day.      gabapentin (NEURONTIN) 300 MG capsule Take 300 mg by mouth 2 (two) times daily.      glimepiride (AMARYL) 4 MG tablet Take 4 mg by mouth once daily.      hydrALAZINE (APRESOLINE) 100 MG tablet Take 100 mg by mouth 3 (three) times daily.      insulin glargine (LANTUS) 100 unit/mL injection Inject 53 Units into the skin every evening. 20 mL 11    isosorbide mononitrate (IMDUR) 60 MG 24 hr tablet Take 60 mg by mouth.      losartan (COZAAR) 100 MG tablet Take 100 mg by mouth once daily.      LUMIGAN 0.01 % Drop Place 1 drop into both eyes every evening.      pantoprazole (PROTONIX) 40 MG tablet Take 1 tablet (40 mg total) by mouth once daily. 90 tablet 0    pen needle, diabetic 32 gauge x 5/32" Ndle USE AS DIRECTED ONCE DAILY 90 each 3    rosuvastatin (CRESTOR) 20 MG tablet Take 20 mg by mouth once daily.       No current facility-administered medications for this visit.       Assessment:   1. Type 2 diabetes mellitus without complication, with long-term current use of insulin        Plan:    -Continue " Trulicity 1.5 mg, 53 U Lantus QHS and Glimepiride to 4mg qd (1 tablet daily)  -consider discontinuing glimepiride at next visit if hypoglycemic episodes continue.   -POC A1c today: 7.6  -On ACE and Statin according to guidelines.  -Follow ADA Diet. Avoid soda, simple sweets, and limit rice/pasta/breads/starches.  -Maintain healthy weight with goal BMI <30.  Exercise 5 times per week for 30 minutes per day.  -Last microalbumin/creatine ratio: 9/2/22  -Last diabetic foot exam: 9/2/22  -Last diabetic eye exam: Follows [facility], release of records signed today     Return to clinic in 3 months, or sooner if needed.     Flor Kirk M.D.  [facility]

## 2023-03-27 DIAGNOSIS — K21.9 GASTROESOPHAGEAL REFLUX DISEASE, UNSPECIFIED WHETHER ESOPHAGITIS PRESENT: ICD-10-CM

## 2023-03-27 DIAGNOSIS — E11.9 TYPE 2 DIABETES MELLITUS WITHOUT COMPLICATION, WITH LONG-TERM CURRENT USE OF INSULIN: ICD-10-CM

## 2023-03-27 DIAGNOSIS — Z79.4 TYPE 2 DIABETES MELLITUS WITHOUT COMPLICATION, WITH LONG-TERM CURRENT USE OF INSULIN: ICD-10-CM

## 2023-03-27 RX ORDER — GLIMEPIRIDE 4 MG/1
4 TABLET ORAL DAILY
Qty: 90 TABLET | Refills: 1 | Status: SHIPPED | OUTPATIENT
Start: 2023-03-27 | End: 2023-06-27

## 2023-03-27 RX ORDER — PANTOPRAZOLE SODIUM 40 MG/1
40 TABLET, DELAYED RELEASE ORAL DAILY
Qty: 90 TABLET | Refills: 1 | Status: SHIPPED | OUTPATIENT
Start: 2023-03-27 | End: 2023-09-01 | Stop reason: SDUPTHER

## 2023-03-27 RX ORDER — DULAGLUTIDE 1.5 MG/.5ML
1.5 INJECTION, SOLUTION SUBCUTANEOUS
Qty: 4 PEN | Refills: 6 | Status: SHIPPED | OUTPATIENT
Start: 2023-03-27 | End: 2023-06-26 | Stop reason: SDUPTHER

## 2023-03-27 RX ORDER — INSULIN GLARGINE 100 [IU]/ML
53 INJECTION, SOLUTION SUBCUTANEOUS NIGHTLY
Qty: 20 ML | Refills: 11 | Status: SHIPPED | OUTPATIENT
Start: 2023-03-27 | End: 2023-06-27

## 2023-05-09 RX ORDER — PEN NEEDLE, DIABETIC 30 GX3/16"
NEEDLE, DISPOSABLE MISCELLANEOUS
Qty: 90 EACH | Refills: 3 | Status: SHIPPED | OUTPATIENT
Start: 2023-05-09 | End: 2024-01-18 | Stop reason: SDUPTHER

## 2023-05-18 DIAGNOSIS — M10.9 GOUT OF ANKLE, UNSPECIFIED CAUSE, UNSPECIFIED CHRONICITY, UNSPECIFIED LATERALITY: Primary | ICD-10-CM

## 2023-05-18 RX ORDER — PREDNISONE 20 MG/1
40 TABLET ORAL DAILY
Qty: 6 TABLET | Refills: 0 | Status: SHIPPED | OUTPATIENT
Start: 2023-05-18 | End: 2023-05-21

## 2023-05-18 NOTE — PROGRESS NOTES
Spoke to patient on phone.  She is having another gout flare to her ankle.  Same as previous incidents.  Would like steroids to help to flare.  Will send prednisone 40 mg qd x 3 days.  Instructed patient to check CBGs.  If >250 she is to stop prednisone and call clinic.  She is to also call clinic and notify us of how she is doing in a day or two.  If needed will get her a [facility] apt.     Flor Kirk M.D.  [facility]

## 2023-06-01 DIAGNOSIS — Z79.4 TYPE 2 DIABETES MELLITUS WITHOUT COMPLICATION, WITH LONG-TERM CURRENT USE OF INSULIN: Primary | ICD-10-CM

## 2023-06-01 DIAGNOSIS — E11.9 TYPE 2 DIABETES MELLITUS WITHOUT COMPLICATION, WITH LONG-TERM CURRENT USE OF INSULIN: Primary | ICD-10-CM

## 2023-06-13 ENCOUNTER — OFFICE VISIT (OUTPATIENT)
Dept: FAMILY MEDICINE | Facility: CLINIC | Age: 68
End: 2023-06-13
Payer: MEDICARE

## 2023-06-13 VITALS
TEMPERATURE: 98 F | DIASTOLIC BLOOD PRESSURE: 73 MMHG | SYSTOLIC BLOOD PRESSURE: 129 MMHG | BODY MASS INDEX: 42.74 KG/M2 | WEIGHT: 282 LBS | OXYGEN SATURATION: 98 % | HEIGHT: 68 IN | RESPIRATION RATE: 18 BRPM | HEART RATE: 80 BPM

## 2023-06-13 DIAGNOSIS — Z12.11 COLON CANCER SCREENING: Primary | ICD-10-CM

## 2023-06-13 DIAGNOSIS — M10.9 ACUTE GOUT OF FOOT, UNSPECIFIED CAUSE, UNSPECIFIED LATERALITY: ICD-10-CM

## 2023-06-13 PROCEDURE — 99215 OFFICE O/P EST HI 40 MIN: CPT | Mod: PBBFAC

## 2023-06-13 NOTE — PROGRESS NOTES
[facility] OFFICE VISIT NOTE  Ivania Hatch  [id number]  06/13/2023      Chief Complaint: Medication Refill      HPI    Ivania Hatch is a 67 y.o. female  presenting to [facility] for medication refills.    -Gout flare resolved with prednisone.    -patient did not bring medications with her today.  There is confusion as to what patient is taking at this time.  Follow nephrology (Dr. Lopes and [facility] cardiology).  Requesting refills today.  States she has enough medications to last her for another month.   -no other complaints or concerns today     ROS:  CONSTITUTIONAL: No weight loss, fever, chills, or weakness.    CARDIOVASCULAR: No chest pain, chest pressure, or chest discomfort. No palpitations.    RESPIRATORY: No shortness of breath, cough, or sputum.    GASTROINTESTINAL: No nausea, vomiting or diarrhea. No abdominal latasha    Vitals:    06/13/23 1445   BP: 129/73   Pulse: 80   Resp: 18   Temp: 98.2 °F (36.8 °C)     PE:  General: appears well, in no acute distress   Eye: no scleral icterus   HENT: MMM  Respiratory: clear to auscultation bilaterally, nonlabored respirations   Cardiovascular: regular rate and rhythm without murmurs or gallops, no edema in bilateral lower extremities     Current Medications:   Current Outpatient Medications   Medication Sig Dispense Refill    amLODIPine (NORVASC) 10 MG tablet Take 10 mg by mouth once daily.      aspirin (ECOTRIN) 81 MG EC tablet Take 81 mg by mouth.      biotin 5,000 mcg Subl biotin 5,000 mcg sublingual tablet   Place by sublingual route.      carvediloL (COREG) 25 MG tablet Take 25 mg by mouth 2 (two) times daily.      cetirizine (ZYRTEC) 10 MG tablet Take 10 mg by mouth every evening.      clotrimazole (LOTRIMIN) 1 % cream Apply topically 2 (two) times daily. 24 g 2    diclofenac sodium (VOLTAREN) 1 % Gel See Instructions, APPLY 1 THIN LAYER TOPICALLY 4 TIMES DAILY AS NEEDED FOR PAIN /  NOT  TO  EXCEED  16GRAMS/DAY  SINGLE  JOINT  OF  LOWER  EXTREMITIES, # 300 gm, 1  "Refill(s), Pharmacy: [facility], 175, cm, Height/Length Dosing, 08/23/21 10:4... 50 g 2    docusate sodium 100 mg capsule Stool Softener 100 mg tablet   Take 1 tablet every day by oral route.      dulaglutide (TRULICITY) 1.5 mg/0.5 mL pen injector Inject 1.5 mg into the skin every 7 days. 4 pen 6    famotidine (PEPCID) 20 MG tablet See Instructions, Take 1 tablet by mouth twice daily, # 180 tab(s), 3 Refill(s), Pharmacy: [facility], 175, cm, Height/Length Dosing, 08/23/21 10:41:00 CDT, 126, kg, Weight Dosing, 08/23/21 10:41:00  tablet 0    ferrous sulfate (FEOSOL) 325 mg (65 mg iron) Tab tablet See Instructions, Take 1 tablet by mouth once daily for 90 days, # 90 tab(s), 3 Refill(s), Pharmacy: [facility], 175, cm, Height/Length Dosing, 08/23/21 10:41:00 CDT, 126, kg, Weight Dosing, 08/23/21 10:41:00 CDT 90 tablet 1    fluticasone propionate (FLONASE) 50 mcg/actuation nasal spray by Nasal route.      furosemide (LASIX) 80 MG tablet Take 80 mg by mouth every other day.      gabapentin (NEURONTIN) 300 MG capsule Take 300 mg by mouth 2 (two) times daily.      glimepiride (AMARYL) 4 MG tablet Take 1 tablet (4 mg total) by mouth once daily. 90 tablet 1    hydrALAZINE (APRESOLINE) 100 MG tablet Take 100 mg by mouth 3 (three) times daily.      insulin glargine (LANTUS) 100 unit/mL injection Inject 53 Units into the skin every evening. 20 mL 11    isosorbide mononitrate (IMDUR) 60 MG 24 hr tablet Take 60 mg by mouth.      losartan (COZAAR) 100 MG tablet Take 100 mg by mouth once daily.      LUMIGAN 0.01 % Drop Place 1 drop into both eyes every evening.      pantoprazole (PROTONIX) 40 MG tablet Take 1 tablet (40 mg total) by mouth once daily. 90 tablet 1    pen needle, diabetic 32 gauge x 5/32" Ndle USE AS DIRECTED ONCE DAILY 90 each 3    rosuvastatin (CRESTOR) 20 MG tablet Take 20 mg by mouth once daily.       No current facility-administered medications for this visit.       Assessment: "   1. Colon cancer screening    2. Acute gout of foot, unspecified cause, unspecified laterality        Plan:  -instructed patient that it is not safe for me to refill medications today until she brings all medications with her to her next apt.  Patient understands and agreeable to return in 2 weeks with all medications.   -will complete med rec at next visit  -gout flare resolved  -referral to GI ([facility]) for colonoscopy  -DM eye exam with [facility] done yesterday per patient, release of records signed.     Return to clinic in 2 weeks for pap and medication refill, or sooner if needed.     Flor Kirk M.D.  [facility]

## 2023-06-14 NOTE — PROGRESS NOTES
I reviewed History, PE, A/P and chart was reviewed.  Services provided in the outpatient department of  a teaching facility, I was immediately available.  I agree with resident, care reasonable and necessary.   Management discussed with resident at time of visit.    Unable to refill meds due to multiple medication discrepancies and pt unclear of list. Pt will return  Fully independent and functions below stated age    Has outside renal (Marianne ) and CARD    Agree with offering PAP or at least pelvic, last pap 2016 HPV negative  Scope referral placed,  FIT 1/2022 ( scanned os occult blood but was FIT) not adequate screening due to FH and high risk status    Maday Bustamante MD  [facility] [place]  [facility]

## 2023-06-26 DIAGNOSIS — E11.9 TYPE 2 DIABETES MELLITUS WITHOUT COMPLICATION, WITH LONG-TERM CURRENT USE OF INSULIN: ICD-10-CM

## 2023-06-26 DIAGNOSIS — Z79.4 TYPE 2 DIABETES MELLITUS WITHOUT COMPLICATION, WITH LONG-TERM CURRENT USE OF INSULIN: ICD-10-CM

## 2023-06-26 RX ORDER — DULAGLUTIDE 1.5 MG/.5ML
1.5 INJECTION, SOLUTION SUBCUTANEOUS
Qty: 4 PEN | Refills: 0 | Status: SHIPPED | OUTPATIENT
Start: 2023-06-26 | End: 2023-06-27 | Stop reason: SDUPTHER

## 2023-06-27 ENCOUNTER — OFFICE VISIT (OUTPATIENT)
Dept: FAMILY MEDICINE | Facility: CLINIC | Age: 68
End: 2023-06-27
Payer: MEDICARE

## 2023-06-27 VITALS
RESPIRATION RATE: 18 BRPM | SYSTOLIC BLOOD PRESSURE: 129 MMHG | DIASTOLIC BLOOD PRESSURE: 72 MMHG | OXYGEN SATURATION: 98 % | BODY MASS INDEX: 42.89 KG/M2 | HEART RATE: 77 BPM | WEIGHT: 283 LBS | TEMPERATURE: 98 F | HEIGHT: 68 IN

## 2023-06-27 DIAGNOSIS — Z12.4 CERVICAL CANCER SCREENING: ICD-10-CM

## 2023-06-27 DIAGNOSIS — K21.9 GASTROESOPHAGEAL REFLUX DISEASE, UNSPECIFIED WHETHER ESOPHAGITIS PRESENT: ICD-10-CM

## 2023-06-27 DIAGNOSIS — Z79.4 TYPE 2 DIABETES MELLITUS WITHOUT COMPLICATION, WITH LONG-TERM CURRENT USE OF INSULIN: ICD-10-CM

## 2023-06-27 DIAGNOSIS — Z79.899 MEDICATION MANAGEMENT: Primary | ICD-10-CM

## 2023-06-27 DIAGNOSIS — E11.9 TYPE 2 DIABETES MELLITUS WITHOUT COMPLICATION, WITH LONG-TERM CURRENT USE OF INSULIN: ICD-10-CM

## 2023-06-27 LAB — HBA1C MFR BLD: 7.3 %

## 2023-06-27 PROCEDURE — 87624 HPV HI-RISK TYP POOLED RSLT: CPT

## 2023-06-27 PROCEDURE — 88174 CYTOPATH C/V AUTO IN FLUID: CPT

## 2023-06-27 PROCEDURE — 99215 OFFICE O/P EST HI 40 MIN: CPT | Mod: PBBFAC

## 2023-06-27 PROCEDURE — 83036 HEMOGLOBIN GLYCOSYLATED A1C: CPT | Mod: PBBFAC

## 2023-06-27 RX ORDER — DULAGLUTIDE 1.5 MG/.5ML
1.5 INJECTION, SOLUTION SUBCUTANEOUS
Qty: 4 PEN | Refills: 1 | Status: SHIPPED | OUTPATIENT
Start: 2023-06-27 | End: 2023-09-01 | Stop reason: SDUPTHER

## 2023-06-27 RX ORDER — INSULIN GLARGINE 100 [IU]/ML
INJECTION, SOLUTION SUBCUTANEOUS
COMMUNITY
Start: 2023-05-15 | End: 2023-12-04 | Stop reason: SDUPTHER

## 2023-06-27 RX ORDER — CARVEDILOL 25 MG/1
25 TABLET ORAL 2 TIMES DAILY WITH MEALS
COMMUNITY
End: 2023-06-27

## 2023-06-27 RX ORDER — CHOLECALCIFEROL (VITAMIN D3) 125 MCG
5000 CAPSULE ORAL
COMMUNITY

## 2023-06-27 RX ORDER — GARLIC 1000 MG
1000 CAPSULE ORAL
COMMUNITY

## 2023-06-27 NOTE — PROGRESS NOTES
Chief Complaint  Medication Refill      History of Present Illness  Ivania Hatch is a 67 y.o. female presents to the clinic for Medication refills and pap; last seen 6/13/2023 didn't have medications was unable to reconcile list    No acute complaints. Brought in all medications to be reconciled    DM II  Adherent to Trulicity, Glimepiride 4mg daily, Lantus 55 qhs  No hypoglycemic episodes since taper in glimepride  Requesting refills for Trulicity      Review of Systems   Constitutional:  Negative for chills and fever.   Gastrointestinal:  Negative for abdominal pain, blood in stool, constipation, diarrhea, nausea and vomiting.   Genitourinary:  Negative for hematuria.   Skin:  Negative for rash.       Physical Exam    Vitals:    06/27/23 1354   BP: 129/72   Pulse: 77   Resp: 18   Temp: 98.1 °F (36.7 °C)     Wt Readings from Last 2 Encounters:   06/27/23 128.4 kg (283 lb)   06/13/23 127.9 kg (282 lb)     Gen: NAD  : Vaginal Exam: No inguinal lymphadenopathy on palpation. No vulvar erythema or lesions. Vaginal mucosa with atrophy, no erythema. Physiologic discharge present. Cervix well visualized without lesion or erythema. No cervical motion tenderness to palpation. No adnexal masses or tenderness to adnexal palpation.     Chaperone, Bibiana, MA      Current Outpatient Medications  Current Outpatient Medications   Medication Instructions    amLODIPine (NORVASC) 10 mg, Oral, Daily    aspirin (ECOTRIN) 81 mg, Oral    biotin 5,000 mcg Subl biotin 5,000 mcg sublingual tablet   Place by sublingual route.    carvediloL (COREG) 25 mg, Oral, 2 times daily    cetirizine (ZYRTEC) 10 mg, Oral, Nightly    cholecalciferol (vitamin D3) 5,000 Units, Oral    clotrimazole (LOTRIMIN) 1 % cream Topical (Top), 2 times daily    diclofenac sodium (VOLTAREN) 1 % Gel <BR>See Instructions, APPLY 1 THIN LAYER TOPICALLY 4 TIMES DAILY AS NEEDED FOR PAIN /  NOT  TO  EXCEED  16GRAMS/DAY  SINGLE  JOINT  OF  LOWER  EXTREMITIES, # 300 gm, 1  "Refill(s), Pharmacy: [facility], 175, cm, Height/Length Dosing, 21 10:4...    docusate sodium 100 mg capsule Stool Softener 100 mg tablet   Take 1 tablet every day by oral route.    ferrous sulfate (FEOSOL) 325 mg (65 mg iron) Tab tablet <BR>See Instructions, Take 1 tablet by mouth once daily for 90 days, # 90 tab(s), 3 Refill(s), Pharmacy: [facility], 175, cm, Height/Length Dosing, 21 10:41:00 CDT, 126, kg, Weight Dosing, 21 10:41:00 CDT    fluticasone propionate (FLONASE) 50 mcg/actuation nasal spray Nasal    furosemide (LASIX) 80 mg, Oral, Every other day    gabapentin (NEURONTIN) 300 mg, Oral, 2 times daily    garlic 1,000 mg, Oral    hydrALAZINE (APRESOLINE) 100 mg, Oral, 3 times daily    isosorbide mononitrate (IMDUR) 60 mg, Oral    LANTUS SOLOSTAR U-100 INSULIN glargine 100 units/mL SubQ pen SMARTSI Unit(s) SUB-Q Every Evening    losartan (COZAAR) 100 mg, Oral, Daily    LUMIGAN 0.01 % Drop 1 drop, Both Eyes, Nightly    pantoprazole (PROTONIX) 40 mg, Oral, Daily    pen needle, diabetic 32 gauge x 5/32" Ndle USE AS DIRECTED ONCE DAILY    rosuvastatin (CRESTOR) 20 mg, Oral, Daily    TRULICITY 1.5 mg, Subcutaneous, Every 7 days             Assessment / Plan:    Medication management  Gastroesophageal reflux disease, unspecified whether esophagitis present  Reviewed each medication and explained indications.   Discontinued famotidine  Patient never initiated spironolactone, re=moved from medication list     Type 2 diabetes mellitus without complication, with long-term current use of insulin  -     POCT HEMOGLOBIN A1C 7.3  Discontinue Glimeperide  Refilled and continued Trulicty  Continue monitoring fasting CBGs, if >180 call clinic and inform  -     dulaglutide (TRULICITY) 1.5 mg/0.5 mL pen injector; Inject 1.5 mg into the skin every 7 days.  Dispense: 4 pen; Refill: 1    Cervical cancer screening  Pap obtained today, if WNL no further screening indicated given HPV neg " 2016  -     Liquid-Based Pap Smear, Screening Screening; Future; Expected date: 06/27/2023            Follow up:    In 3 months, or earlier if needed.     Gaviota Briones MD  [facility]

## 2023-06-28 NOTE — PROGRESS NOTES
Faculty Attestation: Ivania Hatch  was seen in Family Medicine Clinic. Discussed with resident at the time of the visit. History of Present Illness, Physical Exam, and Assessment and Plan reviewed. Treatment plan is reasonable and appropriate. Compliance with treatment recommendations is important.  Recommend close follow up.     Kam Rai MD

## 2023-06-30 LAB — PSYCHE PATHOLOGY RESULT: NORMAL

## 2023-08-14 ENCOUNTER — LAB VISIT (OUTPATIENT)
Dept: LAB | Facility: HOSPITAL | Age: 68
End: 2023-08-14
Attending: INTERNAL MEDICINE
Payer: MEDICARE

## 2023-08-14 DIAGNOSIS — E55.9 AVITAMINOSIS D: ICD-10-CM

## 2023-08-14 DIAGNOSIS — E87.79 VOLUME EXCESS, DISTURBED STARLING FORCES: ICD-10-CM

## 2023-08-14 DIAGNOSIS — E66.8 HYPERPLASTIC-HYPERTROPHIC OBESITY: ICD-10-CM

## 2023-08-14 DIAGNOSIS — M10.00 GOUTY NEURITIS: ICD-10-CM

## 2023-08-14 DIAGNOSIS — E11.9 DIABETES MELLITUS WITHOUT COMPLICATION: ICD-10-CM

## 2023-08-14 DIAGNOSIS — N18.32 CHRONIC KIDNEY DISEASE (CKD) STAGE G3B/A1, MODERATELY DECREASED GLOMERULAR FILTRATION RATE (GFR) BETWEEN 30-44 ML/MIN/1.73 SQUARE METER AND ALBUMINURIA CREATININE RATIO LESS THAN 30 MG/G: Primary | ICD-10-CM

## 2023-08-14 DIAGNOSIS — G63 GOUTY NEURITIS: ICD-10-CM

## 2023-08-14 LAB
ALBUMIN SERPL-MCNC: 3.8 G/DL (ref 3.4–4.8)
ALBUMIN/GLOB SERPL: 1 RATIO (ref 1.1–2)
ALP SERPL-CCNC: 98 UNIT/L (ref 40–150)
ALT SERPL-CCNC: 18 UNIT/L (ref 0–55)
AST SERPL-CCNC: 16 UNIT/L (ref 5–34)
BASOPHILS # BLD AUTO: 0.01 X10(3)/MCL
BASOPHILS NFR BLD AUTO: 0.1 %
BILIRUB SERPL-MCNC: 0.3 MG/DL
BUN SERPL-MCNC: 26.1 MG/DL (ref 9.8–20.1)
CALCIUM SERPL-MCNC: 10.3 MG/DL (ref 8.4–10.2)
CHLORIDE SERPL-SCNC: 107 MMOL/L (ref 98–107)
CO2 SERPL-SCNC: 28 MMOL/L (ref 23–31)
CREAT SERPL-MCNC: 1.5 MG/DL (ref 0.55–1.02)
DEPRECATED CALCIDIOL+CALCIFEROL SERPL-MC: 69.1 NG/ML (ref 30–80)
EOSINOPHIL # BLD AUTO: 0.23 X10(3)/MCL (ref 0–0.9)
EOSINOPHIL NFR BLD AUTO: 3 %
ERYTHROCYTE [DISTWIDTH] IN BLOOD BY AUTOMATED COUNT: 14.7 % (ref 11.5–17)
GFR SERPLBLD CREATININE-BSD FMLA CKD-EPI: 38 MLS/MIN/1.73/M2
GLOBULIN SER-MCNC: 3.8 GM/DL (ref 2.4–3.5)
GLUCOSE SERPL-MCNC: 122 MG/DL (ref 82–115)
HCT VFR BLD AUTO: 38.4 % (ref 37–47)
HGB BLD-MCNC: 11.3 G/DL (ref 12–16)
IMM GRANULOCYTES # BLD AUTO: 0.01 X10(3)/MCL (ref 0–0.04)
IMM GRANULOCYTES NFR BLD AUTO: 0.1 %
LYMPHOCYTES # BLD AUTO: 2.05 X10(3)/MCL (ref 0.6–4.6)
LYMPHOCYTES NFR BLD AUTO: 27 %
MAGNESIUM SERPL-MCNC: 1.9 MG/DL (ref 1.6–2.6)
MCH RBC QN AUTO: 24.5 PG (ref 27–31)
MCHC RBC AUTO-ENTMCNC: 29.4 G/DL (ref 33–36)
MCV RBC AUTO: 83.1 FL (ref 80–94)
MONOCYTES # BLD AUTO: 0.61 X10(3)/MCL (ref 0.1–1.3)
MONOCYTES NFR BLD AUTO: 8 %
NEUTROPHILS # BLD AUTO: 4.68 X10(3)/MCL (ref 2.1–9.2)
NEUTROPHILS NFR BLD AUTO: 61.8 %
PHOSPHATE SERPL-MCNC: 3.9 MG/DL (ref 2.3–4.7)
PLATELET # BLD AUTO: 273 X10(3)/MCL (ref 130–400)
PMV BLD AUTO: 11.5 FL (ref 7.4–10.4)
POTASSIUM SERPL-SCNC: 4 MMOL/L (ref 3.5–5.1)
PROT SERPL-MCNC: 7.6 GM/DL (ref 5.8–7.6)
PTH-INTACT SERPL-MCNC: 267.2 PG/ML (ref 8.7–77)
RBC # BLD AUTO: 4.62 X10(6)/MCL (ref 4.2–5.4)
SODIUM SERPL-SCNC: 144 MMOL/L (ref 136–145)
WBC # SPEC AUTO: 7.59 X10(3)/MCL (ref 4.5–11.5)

## 2023-08-14 PROCEDURE — 80053 COMPREHEN METABOLIC PANEL: CPT

## 2023-08-14 PROCEDURE — 36415 COLL VENOUS BLD VENIPUNCTURE: CPT

## 2023-08-14 PROCEDURE — 83970 ASSAY OF PARATHORMONE: CPT

## 2023-08-14 PROCEDURE — 85025 COMPLETE CBC W/AUTO DIFF WBC: CPT

## 2023-08-14 PROCEDURE — 84100 ASSAY OF PHOSPHORUS: CPT

## 2023-08-14 PROCEDURE — 83735 ASSAY OF MAGNESIUM: CPT

## 2023-08-14 PROCEDURE — 82306 VITAMIN D 25 HYDROXY: CPT

## 2023-09-01 ENCOUNTER — OFFICE VISIT (OUTPATIENT)
Dept: FAMILY MEDICINE | Facility: CLINIC | Age: 68
End: 2023-09-01
Payer: MEDICARE

## 2023-09-01 VITALS
WEIGHT: 285.38 LBS | HEIGHT: 68 IN | HEART RATE: 79 BPM | DIASTOLIC BLOOD PRESSURE: 73 MMHG | TEMPERATURE: 98 F | SYSTOLIC BLOOD PRESSURE: 113 MMHG | OXYGEN SATURATION: 98 % | BODY MASS INDEX: 43.25 KG/M2

## 2023-09-01 DIAGNOSIS — K21.9 GASTROESOPHAGEAL REFLUX DISEASE, UNSPECIFIED WHETHER ESOPHAGITIS PRESENT: ICD-10-CM

## 2023-09-01 DIAGNOSIS — E11.9 TYPE 2 DIABETES MELLITUS WITHOUT COMPLICATION, WITH LONG-TERM CURRENT USE OF INSULIN: Primary | ICD-10-CM

## 2023-09-01 DIAGNOSIS — Z12.31 ENCOUNTER FOR SCREENING MAMMOGRAM FOR MALIGNANT NEOPLASM OF BREAST: ICD-10-CM

## 2023-09-01 DIAGNOSIS — Z79.4 TYPE 2 DIABETES MELLITUS WITHOUT COMPLICATION, WITH LONG-TERM CURRENT USE OF INSULIN: Primary | ICD-10-CM

## 2023-09-01 LAB — HBA1C MFR BLD: 7.5 %

## 2023-09-01 PROCEDURE — 83036 HEMOGLOBIN GLYCOSYLATED A1C: CPT | Mod: PBBFAC | Performed by: STUDENT IN AN ORGANIZED HEALTH CARE EDUCATION/TRAINING PROGRAM

## 2023-09-01 PROCEDURE — 99215 OFFICE O/P EST HI 40 MIN: CPT | Mod: PBBFAC | Performed by: STUDENT IN AN ORGANIZED HEALTH CARE EDUCATION/TRAINING PROGRAM

## 2023-09-01 RX ORDER — PANTOPRAZOLE SODIUM 40 MG/1
40 TABLET, DELAYED RELEASE ORAL DAILY
Qty: 90 TABLET | Refills: 1 | Status: SHIPPED | OUTPATIENT
Start: 2023-09-01 | End: 2023-12-04 | Stop reason: SDUPTHER

## 2023-09-01 RX ORDER — DULAGLUTIDE 1.5 MG/.5ML
1.5 INJECTION, SOLUTION SUBCUTANEOUS
Qty: 4 PEN | Refills: 1 | Status: SHIPPED | OUTPATIENT
Start: 2023-09-01 | End: 2023-09-01

## 2023-09-01 RX ORDER — DULAGLUTIDE 3 MG/.5ML
3 INJECTION, SOLUTION SUBCUTANEOUS
Qty: 4 PEN | Refills: 2 | Status: SHIPPED | OUTPATIENT
Start: 2023-09-01 | End: 2023-11-20 | Stop reason: SDUPTHER

## 2023-09-01 RX ORDER — GLIMEPIRIDE 4 MG/1
4 TABLET ORAL
COMMUNITY
Start: 2023-08-20 | End: 2023-09-01

## 2023-09-01 NOTE — PROGRESS NOTES
roChief Complaint  Diabetes      History of Present Illness  Ivania Hatch is a 67 y.o.  female presents to the clinic for routine f/u; last seen 6/27/2023    Acute concerns: None    Chronic conditions:  DM II  Adherent to Trulicity, Lantus 55 qhs  Continued Glimepiride 4mg daily, although discontinued last visit  Forgot to bring logs   No hypoglycemic episodes, polyuria, polydipsia, nausea, vomiting, abdominal pain  Requesting refills for Trulicity    Requesting refills for Protonix    ROS per HPI      Physical Exam    Vitals:    09/01/23 1504   BP: 113/73   Pulse: 79   Temp: 98.1 °F (36.7 °C)     Wt Readings from Last 2 Encounters:   09/01/23 129.5 kg (285 lb 6.4 oz)   06/27/23 128.4 kg (283 lb)     Gen: NAD  Pulm: Nonlabored, CTABL  CV: RRR, no MRG, no peripheral edema  Abd: protuberant, soft, NT, ND, Normoactive BS  MSK: no gait abnormalities      Current Outpatient Medications  Current Outpatient Medications   Medication Instructions    amLODIPine (NORVASC) 10 mg, Oral, Daily    aspirin (ECOTRIN) 81 mg, Oral    biotin 5,000 mcg Subl biotin 5,000 mcg sublingual tablet   Place by sublingual route.    carvediloL (COREG) 25 mg, Oral, 2 times daily    cetirizine (ZYRTEC) 10 mg, Oral, Nightly    cholecalciferol (vitamin D3) 5,000 Units, Oral    clotrimazole (LOTRIMIN) 1 % cream Topical (Top), 2 times daily    diclofenac sodium (VOLTAREN) 1 % Gel <BR>See Instructions, APPLY 1 THIN LAYER TOPICALLY 4 TIMES DAILY AS NEEDED FOR PAIN /  NOT  TO  EXCEED  16GRAMS/DAY  SINGLE  JOINT  OF  LOWER  EXTREMITIES, # 300 gm, 1 Refill(s), Pharmacy: [facility], 175, cm, Height/Length Dosing, 08/23/21 10:4...    docusate sodium 100 mg capsule Stool Softener 100 mg tablet   Take 1 tablet every day by oral route.    ferrous sulfate (FEOSOL) 325 mg (65 mg iron) Tab tablet <BR>See Instructions, Take 1 tablet by mouth once daily for 90 days, # 90 tab(s), 3 Refill(s), Pharmacy: [facility], 175, cm, Height/Length Dosing,  "21 10:41:00 CDT, 126, kg, Weight Dosing, 21 10:41:00 CDT    fluticasone propionate (FLONASE) 50 mcg/actuation nasal spray Nasal    furosemide (LASIX) 80 mg, Oral, Every other day    gabapentin (NEURONTIN) 300 mg, Oral, 2 times daily    garlic 1,000 mg, Oral    hydrALAZINE (APRESOLINE) 100 mg, Oral, 3 times daily    isosorbide mononitrate (IMDUR) 60 mg, Oral    LANTUS SOLOSTAR U-100 INSULIN glargine 100 units/mL SubQ pen SMARTSI Unit(s) SUB-Q Every Evening    losartan (COZAAR) 100 mg, Oral, Daily    LUMIGAN 0.01 % Drop 1 drop, Both Eyes, Nightly    pantoprazole (PROTONIX) 40 mg, Oral, Daily    pen needle, diabetic 32 gauge x 5/32" Ndle USE AS DIRECTED ONCE DAILY    rosuvastatin (CRESTOR) 20 mg, Oral, Daily    TRULICITY 1.5 mg, Subcutaneous, Every 7 days             Assessment / Plan:    Type 2 diabetes mellitus without complication, with long-term current use of insulin  Optimal glycemic control. Discontinue Glimperide. Continue current management of Lantus 55 qhs   Uptirating Trulicity 3mg qweekly  Lipid panel ordered        -     POCT HEMOGLOBIN A1C 7.4  -     dulaglutide (TRULICITY) 3 mg/0.5 mL pen injector; Inject 3mg into the skin every 7 days.  Dispense: 4 pen ; Refill: 2    Gastroesophageal reflux disease, unspecified whether esophagitis present  Refilled and continued current management   -     pantoprazole (PROTONIX) 40 MG tablet; Take 1 tablet (40 mg total) by mouth once daily.  Dispense: 90 tablet; Refill: 1            Follow up:    In 3 months, or earlier if needed. Microalbumin/cr, DFE, Fundoscopy    Gaviota Briones MD  [employer]  PGY-3      "

## 2023-09-04 NOTE — PROGRESS NOTES
I have seen the patient, reviewed the resident's history and physical, assessment, plan, and progress note. I have personally interviewed and examined the patient at bedside and: agree with the findings.     Brandyn Vasquez MD  [facility]

## 2023-09-16 RX ORDER — GABAPENTIN 300 MG/1
300 CAPSULE ORAL 2 TIMES DAILY
Qty: 60 CAPSULE | Refills: 1 | Status: SHIPPED | OUTPATIENT
Start: 2023-09-16 | End: 2023-11-09 | Stop reason: SDUPTHER

## 2023-10-09 RX ORDER — LOSARTAN POTASSIUM 100 MG/1
100 TABLET ORAL DAILY
Qty: 90 TABLET | Refills: 0 | Status: SHIPPED | OUTPATIENT
Start: 2023-10-09 | End: 2024-01-18

## 2023-10-23 LAB
LEFT EYE DM RETINOPATHY: NEGATIVE
RIGHT EYE DM RETINOPATHY: NEGATIVE

## 2023-10-24 ENCOUNTER — HOSPITAL ENCOUNTER (OUTPATIENT)
Dept: RADIOLOGY | Facility: HOSPITAL | Age: 68
Discharge: HOME | End: 2023-10-24
Attending: STUDENT IN AN ORGANIZED HEALTH CARE EDUCATION/TRAINING PROGRAM
Payer: MEDICARE

## 2023-10-24 DIAGNOSIS — Z12.31 ENCOUNTER FOR SCREENING MAMMOGRAM FOR MALIGNANT NEOPLASM OF BREAST: ICD-10-CM

## 2023-10-24 PROCEDURE — 77067 SCR MAMMO BI INCL CAD: CPT | Mod: TC

## 2023-10-24 PROCEDURE — 77067 SCR MAMMO BI INCL CAD: CPT | Mod: 26,,, | Performed by: RADIOLOGY

## 2023-10-24 PROCEDURE — 77063 MAMMO DIGITAL SCREENING BILAT WITH TOMO: ICD-10-PCS | Mod: 26,,, | Performed by: RADIOLOGY

## 2023-10-24 PROCEDURE — 77063 BREAST TOMOSYNTHESIS BI: CPT | Mod: 26,,, | Performed by: RADIOLOGY

## 2023-10-24 PROCEDURE — 77067 MAMMO DIGITAL SCREENING BILAT WITH TOMO: ICD-10-PCS | Mod: 26,,, | Performed by: RADIOLOGY

## 2023-11-07 LAB — CRC RECOMMENDATION EXT: NORMAL

## 2023-11-09 ENCOUNTER — LAB VISIT (OUTPATIENT)
Dept: LAB | Facility: HOSPITAL | Age: 68
End: 2023-11-09
Attending: INTERNAL MEDICINE
Payer: MEDICARE

## 2023-11-09 DIAGNOSIS — E78.5 HYPERLIPIDEMIA, UNSPECIFIED HYPERLIPIDEMIA TYPE: ICD-10-CM

## 2023-11-09 DIAGNOSIS — Z79.4 TYPE 2 DIABETES MELLITUS WITHOUT COMPLICATION, WITH LONG-TERM CURRENT USE OF INSULIN: ICD-10-CM

## 2023-11-09 DIAGNOSIS — M10.00 GOUTY NEURITIS: ICD-10-CM

## 2023-11-09 DIAGNOSIS — R31.29 MICROSCOPIC HEMATURIA: ICD-10-CM

## 2023-11-09 DIAGNOSIS — I12.9 PARENCHYMAL RENAL HYPERTENSION: ICD-10-CM

## 2023-11-09 DIAGNOSIS — E11.9 TYPE 2 DIABETES MELLITUS WITHOUT COMPLICATION, WITH LONG-TERM CURRENT USE OF INSULIN: ICD-10-CM

## 2023-11-09 DIAGNOSIS — N18.32 CHRONIC KIDNEY DISEASE (CKD) STAGE G3B/A1, MODERATELY DECREASED GLOMERULAR FILTRATION RATE (GFR) BETWEEN 30-44 ML/MIN/1.73 SQUARE METER AND ALBUMINURIA CREATININE RATIO LESS THAN 30 MG/G: Primary | ICD-10-CM

## 2023-11-09 DIAGNOSIS — E55.9 AVITAMINOSIS D: ICD-10-CM

## 2023-11-09 DIAGNOSIS — E11.9 DIABETES MELLITUS WITHOUT COMPLICATION: ICD-10-CM

## 2023-11-09 DIAGNOSIS — G63 GOUTY NEURITIS: ICD-10-CM

## 2023-11-09 DIAGNOSIS — E66.8 HYPERPLASTIC-HYPERTROPHIC OBESITY: ICD-10-CM

## 2023-11-09 LAB
ALBUMIN SERPL-MCNC: 3.8 G/DL (ref 3.4–4.8)
ALBUMIN/GLOB SERPL: 1.2 RATIO (ref 1.1–2)
ALP SERPL-CCNC: 99 UNIT/L (ref 40–150)
ALT SERPL-CCNC: 18 UNIT/L (ref 0–55)
AST SERPL-CCNC: 18 UNIT/L (ref 5–34)
BASOPHILS # BLD AUTO: 0.03 X10(3)/MCL
BASOPHILS NFR BLD AUTO: 0.4 %
BILIRUB SERPL-MCNC: 0.3 MG/DL
BUN SERPL-MCNC: 25.9 MG/DL (ref 9.8–20.1)
CALCIUM SERPL-MCNC: 9.5 MG/DL (ref 8.4–10.2)
CHLORIDE SERPL-SCNC: 107 MMOL/L (ref 98–107)
CHOLEST SERPL-MCNC: 137 MG/DL
CHOLEST/HDLC SERPL: 4 {RATIO} (ref 0–5)
CO2 SERPL-SCNC: 28 MMOL/L (ref 23–31)
CREAT SERPL-MCNC: 1.5 MG/DL (ref 0.55–1.02)
EOSINOPHIL # BLD AUTO: 0.36 X10(3)/MCL (ref 0–0.9)
EOSINOPHIL NFR BLD AUTO: 4.5 %
ERYTHROCYTE [DISTWIDTH] IN BLOOD BY AUTOMATED COUNT: 14.6 % (ref 11.5–17)
GFR SERPLBLD CREATININE-BSD FMLA CKD-EPI: 38 MLS/MIN/1.73/M2
GLOBULIN SER-MCNC: 3.2 GM/DL (ref 2.4–3.5)
GLUCOSE SERPL-MCNC: 95 MG/DL (ref 82–115)
HCT VFR BLD AUTO: 36 % (ref 37–47)
HDLC SERPL-MCNC: 32 MG/DL (ref 35–60)
HGB BLD-MCNC: 10.7 G/DL (ref 12–16)
IMM GRANULOCYTES # BLD AUTO: 0.01 X10(3)/MCL (ref 0–0.04)
IMM GRANULOCYTES NFR BLD AUTO: 0.1 %
LDLC SERPL CALC-MCNC: 68 MG/DL (ref 50–140)
LYMPHOCYTES # BLD AUTO: 1.35 X10(3)/MCL (ref 0.6–4.6)
LYMPHOCYTES NFR BLD AUTO: 17 %
MAGNESIUM SERPL-MCNC: 2.5 MG/DL (ref 1.6–2.6)
MCH RBC QN AUTO: 25.1 PG (ref 27–31)
MCHC RBC AUTO-ENTMCNC: 29.7 G/DL (ref 33–36)
MCV RBC AUTO: 84.5 FL (ref 80–94)
MONOCYTES # BLD AUTO: 0.59 X10(3)/MCL (ref 0.1–1.3)
MONOCYTES NFR BLD AUTO: 7.4 %
NEUTROPHILS # BLD AUTO: 5.58 X10(3)/MCL (ref 2.1–9.2)
NEUTROPHILS NFR BLD AUTO: 70.6 %
PHOSPHATE SERPL-MCNC: 2.7 MG/DL (ref 2.3–4.7)
PLATELET # BLD AUTO: 232 X10(3)/MCL (ref 130–400)
PMV BLD AUTO: 11.4 FL (ref 7.4–10.4)
POTASSIUM SERPL-SCNC: 4.2 MMOL/L (ref 3.5–5.1)
PROT SERPL-MCNC: 7 GM/DL (ref 5.8–7.6)
RBC # BLD AUTO: 4.26 X10(6)/MCL (ref 4.2–5.4)
SODIUM SERPL-SCNC: 143 MMOL/L (ref 136–145)
TRIGL SERPL-MCNC: 187 MG/DL (ref 37–140)
VLDLC SERPL CALC-MCNC: 37 MG/DL
WBC # SPEC AUTO: 7.92 X10(3)/MCL (ref 4.5–11.5)

## 2023-11-09 PROCEDURE — 85025 COMPLETE CBC W/AUTO DIFF WBC: CPT

## 2023-11-09 PROCEDURE — 83735 ASSAY OF MAGNESIUM: CPT

## 2023-11-09 PROCEDURE — 84100 ASSAY OF PHOSPHORUS: CPT

## 2023-11-09 PROCEDURE — 36415 COLL VENOUS BLD VENIPUNCTURE: CPT

## 2023-11-09 PROCEDURE — 83970 ASSAY OF PARATHORMONE: CPT

## 2023-11-09 PROCEDURE — 82306 VITAMIN D 25 HYDROXY: CPT

## 2023-11-09 PROCEDURE — 80053 COMPREHEN METABOLIC PANEL: CPT

## 2023-11-09 PROCEDURE — 80061 LIPID PANEL: CPT

## 2023-11-09 RX ORDER — GABAPENTIN 300 MG/1
300 CAPSULE ORAL 2 TIMES DAILY
Qty: 60 CAPSULE | Refills: 1 | Status: SHIPPED | OUTPATIENT
Start: 2023-11-09 | End: 2023-12-04 | Stop reason: SDUPTHER

## 2023-11-10 LAB
DEPRECATED CALCIDIOL+CALCIFEROL SERPL-MC: 59.6 NG/ML (ref 30–80)
PTH-INTACT SERPL-MCNC: 291.3 PG/ML (ref 8.7–77)

## 2023-11-20 DIAGNOSIS — E11.9 TYPE 2 DIABETES MELLITUS WITHOUT COMPLICATION, WITH LONG-TERM CURRENT USE OF INSULIN: ICD-10-CM

## 2023-11-20 DIAGNOSIS — Z79.4 TYPE 2 DIABETES MELLITUS WITHOUT COMPLICATION, WITH LONG-TERM CURRENT USE OF INSULIN: ICD-10-CM

## 2023-11-20 RX ORDER — DULAGLUTIDE 3 MG/.5ML
3 INJECTION, SOLUTION SUBCUTANEOUS
Qty: 4 PEN | Refills: 2 | Status: SHIPPED | OUTPATIENT
Start: 2023-11-20 | End: 2023-12-04 | Stop reason: SDUPTHER

## 2023-12-04 ENCOUNTER — OFFICE VISIT (OUTPATIENT)
Dept: FAMILY MEDICINE | Facility: CLINIC | Age: 68
End: 2023-12-04
Payer: MEDICARE

## 2023-12-04 VITALS
BODY MASS INDEX: 42.46 KG/M2 | SYSTOLIC BLOOD PRESSURE: 156 MMHG | DIASTOLIC BLOOD PRESSURE: 84 MMHG | HEART RATE: 72 BPM | OXYGEN SATURATION: 98 % | WEIGHT: 280.19 LBS | HEIGHT: 68 IN | TEMPERATURE: 98 F

## 2023-12-04 DIAGNOSIS — M85.80 OSTEOPENIA, UNSPECIFIED LOCATION: ICD-10-CM

## 2023-12-04 DIAGNOSIS — Z79.4 TYPE 2 DIABETES MELLITUS WITHOUT COMPLICATION, WITH LONG-TERM CURRENT USE OF INSULIN: Primary | ICD-10-CM

## 2023-12-04 DIAGNOSIS — I11.9 HYPERTENSIVE HEART DISEASE WITHOUT CONGESTIVE HEART FAILURE: ICD-10-CM

## 2023-12-04 DIAGNOSIS — J30.2 SEASONAL ALLERGIES: ICD-10-CM

## 2023-12-04 DIAGNOSIS — Z78.0 POSTMENOPAUSAL: ICD-10-CM

## 2023-12-04 DIAGNOSIS — K21.9 GASTROESOPHAGEAL REFLUX DISEASE, UNSPECIFIED WHETHER ESOPHAGITIS PRESENT: ICD-10-CM

## 2023-12-04 DIAGNOSIS — M54.50 CHRONIC LOW BACK PAIN, UNSPECIFIED BACK PAIN LATERALITY, UNSPECIFIED WHETHER SCIATICA PRESENT: ICD-10-CM

## 2023-12-04 DIAGNOSIS — G89.29 CHRONIC LOW BACK PAIN, UNSPECIFIED BACK PAIN LATERALITY, UNSPECIFIED WHETHER SCIATICA PRESENT: ICD-10-CM

## 2023-12-04 DIAGNOSIS — D64.9 NORMOCYTIC ANEMIA: ICD-10-CM

## 2023-12-04 DIAGNOSIS — E11.9 TYPE 2 DIABETES MELLITUS WITHOUT COMPLICATION, WITH LONG-TERM CURRENT USE OF INSULIN: Primary | ICD-10-CM

## 2023-12-04 LAB
CREAT UR-MCNC: 37.3 MG/DL (ref 45–106)
MICROALBUMIN UR-MCNC: <5 UG/ML
MICROALBUMIN/CREAT RATIO PNL UR: ABNORMAL

## 2023-12-04 PROCEDURE — 99214 OFFICE O/P EST MOD 30 MIN: CPT | Mod: PBBFAC

## 2023-12-04 PROCEDURE — 82043 UR ALBUMIN QUANTITATIVE: CPT

## 2023-12-04 RX ORDER — LOSARTAN POTASSIUM 100 MG/1
100 TABLET ORAL DAILY
Qty: 90 TABLET | Refills: 1 | Status: CANCELLED | OUTPATIENT
Start: 2023-12-04

## 2023-12-04 RX ORDER — ROSUVASTATIN CALCIUM 20 MG/1
20 TABLET, COATED ORAL DAILY
Qty: 90 TABLET | Refills: 1 | Status: SHIPPED | OUTPATIENT
Start: 2023-12-04

## 2023-12-04 RX ORDER — POLYETHYLENE GLYCOL 3350 17 G/17G
17 POWDER, FOR SOLUTION ORAL
COMMUNITY
Start: 2023-11-21

## 2023-12-04 RX ORDER — GABAPENTIN 300 MG/1
300 CAPSULE ORAL 2 TIMES DAILY
Qty: 180 CAPSULE | Refills: 1 | Status: SHIPPED | OUTPATIENT
Start: 2023-12-04

## 2023-12-04 RX ORDER — FLUTICASONE PROPIONATE 50 MCG
1 SPRAY, SUSPENSION (ML) NASAL DAILY PRN
Qty: 18.2 ML | Refills: 1 | Status: SHIPPED | OUTPATIENT
Start: 2023-12-04

## 2023-12-04 RX ORDER — DULAGLUTIDE 3 MG/.5ML
3 INJECTION, SOLUTION SUBCUTANEOUS
Qty: 4 PEN | Refills: 2 | Status: SHIPPED | OUTPATIENT
Start: 2023-12-04 | End: 2024-12-03

## 2023-12-04 RX ORDER — PANTOPRAZOLE SODIUM 40 MG/1
40 TABLET, DELAYED RELEASE ORAL DAILY
Qty: 90 TABLET | Refills: 1 | Status: SHIPPED | OUTPATIENT
Start: 2023-12-04

## 2023-12-04 RX ORDER — ASPIRIN 81 MG/1
81 TABLET ORAL DAILY
Qty: 90 TABLET | Refills: 1 | Status: SHIPPED | OUTPATIENT
Start: 2023-12-04

## 2023-12-04 RX ORDER — AMLODIPINE BESYLATE 10 MG/1
10 TABLET ORAL DAILY
Qty: 90 TABLET | Refills: 1 | Status: CANCELLED | OUTPATIENT
Start: 2023-12-04

## 2023-12-04 RX ORDER — INSULIN GLARGINE 100 [IU]/ML
INJECTION, SOLUTION SUBCUTANEOUS
Qty: 15 ML | Refills: 5 | Status: SHIPPED | OUTPATIENT
Start: 2023-12-04 | End: 2024-02-14 | Stop reason: SDUPTHER

## 2023-12-04 RX ORDER — TIMOLOL MALEATE 5 MG/ML
1 SOLUTION/ DROPS OPHTHALMIC 2 TIMES DAILY
COMMUNITY

## 2023-12-04 RX ORDER — FERROUS SULFATE 325(65) MG
TABLET ORAL
Qty: 90 TABLET | Refills: 1 | Status: SHIPPED | OUTPATIENT
Start: 2023-12-04

## 2023-12-04 RX ORDER — DICLOFENAC SODIUM 10 MG/G
GEL TOPICAL
Qty: 50 G | Refills: 2 | Status: SHIPPED | OUTPATIENT
Start: 2023-12-04

## 2023-12-04 RX ORDER — ISOSORBIDE MONONITRATE 60 MG/1
60 TABLET, EXTENDED RELEASE ORAL DAILY
Qty: 90 TABLET | Refills: 1 | Status: CANCELLED | OUTPATIENT
Start: 2023-12-04

## 2023-12-04 RX ORDER — FUROSEMIDE 80 MG/1
80 TABLET ORAL EVERY OTHER DAY
Qty: 45 TABLET | Refills: 1 | Status: CANCELLED | OUTPATIENT
Start: 2023-12-04

## 2023-12-04 RX ORDER — HYDRALAZINE HYDROCHLORIDE 100 MG/1
100 TABLET, FILM COATED ORAL 3 TIMES DAILY
Qty: 270 TABLET | Refills: 1 | Status: CANCELLED | OUTPATIENT
Start: 2023-12-04

## 2023-12-04 RX ORDER — CARVEDILOL 25 MG/1
25 TABLET ORAL 2 TIMES DAILY
Qty: 180 TABLET | Refills: 1 | Status: CANCELLED | OUTPATIENT
Start: 2023-12-04

## 2023-12-04 NOTE — PROGRESS NOTES
Chief Complaint  Follow-up and Diabetes      History of Present Illness  Ivania Hatch is a 67 y.o.  female presents to the clinic for routine f/u; last seen 9/1/2023.    Acute concerns: None    Chronic conditions:  DM II  Adherent to Trulicity 3 mg, Lantus 53 qhs  Discontinued Glimepiride 4mg daily because of CKD  States FBG 120s at home   -A1c 7.5 9/2023  -hypoglycemia episodes: none  Microalbumin/cr: due this year  DM Foot exam: due this year   DM Eye exam: Matt, done 2023    PMH:   HTN -follows [facility] and [facility]ro. [facility] manages her HTN meds. Next [facility] visit 12/21/23. Losartan 100 mg daily, Coreg 25 mg bid, amlodipine 10 mg daily, Imdur 60mg, Hydralazine 100mg, Lasix 80mg every other day. No chest pain, SOB, vision changes, HA's. Took meds this morning. Does not measure bp at home.   HLD- rosuvastatin 20mg daily  Chronic Gout -no recent flare. Prednisone 20mg x 5 days works for acute flares. 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   CKD- follow with Dr. Foster (nephrology)  GERD- protonix 40  Normocytic anemia- on PO Fe. Denies active bleeding.     Health Maintenance:  Cervical CA: Pap 6/2023 ASCUS HPV neg. Need repeat in 12 months.  Breast CA: 9/2023 Right Breast: Negative (BI-RADS 1) Left Breast: Negative (BI-RADS 1)  Colon CA: has FH colon cancer (father). Colonoscopy done 11/2023, polypectomy x1 with Dr. Knott, pathology results pending  DEXA: 9/2021: osteopenia, not on calcium. No FH of hip fractures. Repeat due.  Lung CA: quit 30 years ago. About 5 pack years of smoking.     ROS per HPI    Vitals:    12/04/23 1456   BP: (!) 156/84   Pulse: 72   Temp: 98.3 °F (36.8 °C)       Wt Readings from Last 2 Encounters:   12/04/23 127.1 kg (280 lb 3.2 oz)   09/01/23 129.5 kg (285 lb 6.4 oz)     Physical Exam  Cardiovascular:      Pulses:           Dorsalis pedis pulses are 2+ on the right side and 2+ on the left side.        Posterior tibial pulses are 1+ on the right side and 1+ on the left side.   Musculoskeletal:      Right foot:  Normal range of motion. No deformity.      Left foot: Normal range of motion. No deformity.   Feet:      Right foot:      Protective Sensation: 10 sites tested.  10 sites sensed.      Skin integrity: Skin integrity normal. No ulcer, blister, skin breakdown, erythema, warmth, callus, dry skin or fissure.      Toenail Condition: Right toenails are abnormally thick.      Left foot:      Protective Sensation: 10 sites tested.  10 sites sensed.      Skin integrity: Skin integrity normal. No ulcer, blister, skin breakdown, erythema, warmth, callus, dry skin or fissure.      Toenail Condition: Left toenails are abnormally thick.     General: appears well, in no acute distress   HENT: MMM, oropharynx without erythema/exudate   Neck: supple, no lymphadenopathy, no carotid bruits   Respiratory: clear to auscultation bilaterally, nonlabored respirations   Cardiovascular: regular rate and rhythm without murmurs or gallops, no edema in bilateral lower extremities   Gastrointestinal: soft, non-tender, non-distended, bowel sounds present   Genitourinary: no suprapubic tenderness   Musculoskeletal: no gross deformities observed   Integumentary: no acute rashes or skin lesions observed    Neuro: No focal lesions observed         Current Outpatient Medications  Current Outpatient Medications   Medication Instructions    amLODIPine (NORVASC) 10 mg, Oral, Daily    aspirin (ECOTRIN) 81 mg, Oral    carvediloL (COREG) 25 mg, Oral, 2 times daily    cetirizine (ZYRTEC) 10 mg, Oral, Nightly    cholecalciferol (vitamin D3) 5,000 Units, Oral    clotrimazole (LOTRIMIN) 1 % cream Topical (Top), 2 times daily    diclofenac sodium (VOLTAREN) 1 % Gel <BR>See Instructions, APPLY 1 THIN LAYER TOPICALLY 4 TIMES DAILY AS NEEDED FOR PAIN /  NOT  TO  EXCEED  16GRAMS/DAY  SINGLE  JOINT  OF  LOWER  EXTREMITIES, # 300 gm, 1 Refill(s), Pharmacy: [facility], 175, cm, Height/Length Dosing, 08/23/21 10:4...    docusate sodium 100 mg capsule Stool  "Softener 100 mg tablet   Take 1 tablet every day by oral route.    ferrous sulfate (FEOSOL) 325 mg (65 mg iron) Tab tablet <BR>See Instructions, Take 1 tablet by mouth once daily for 90 days, # 90 tab(s), 3 Refill(s), Pharmacy: [facility], 175, cm, Height/Length Dosing, 21 10:41:00 CDT, 126, kg, Weight Dosing, 21 10:41:00 CDT    fluticasone propionate (FLONASE) 50 mcg/actuation nasal spray Nasal    furosemide (LASIX) 80 mg, Oral, Every other day    gabapentin (NEURONTIN) 300 mg, Oral, 2 times daily    garlic 1,000 mg, Oral    hydrALAZINE (APRESOLINE) 100 mg, Oral, 3 times daily    isosorbide mononitrate (IMDUR) 60 mg, Oral    LANTUS SOLOSTAR U-100 INSULIN glargine 100 units/mL SubQ pen SMARTSI Unit(s) SUB-Q Every Evening    losartan (COZAAR) 100 mg, Oral, Daily    LUMIGAN 0.01 % Drop 1 drop, Both Eyes, Nightly    MIRALAX 17 g, Oral    pantoprazole (PROTONIX) 40 mg, Oral, Daily    pen needle, diabetic 32 gauge x 5/32" Ndle USE AS DIRECTED ONCE DAILY    rosuvastatin (CRESTOR) 20 mg, Oral, Daily    timolol maleate 0.5% (TIMOPTIC) 0.5 % Drop 1 drop, Both Eyes, 2 times daily    TRULICITY 3 mg, Subcutaneous, Every 7 days       Assessment / Plan:    1. Type 2 diabetes mellitus without complication, with long-term current use of insulin  - Microalbumin/Creatinine Ratio, Urine; Future  - POCT HEMOGLOBIN A1C  - Microalbumin/Creatinine Ratio, Urine  - dulaglutide (TRULICITY) 3 mg/0.5 mL pen injector; Inject 3 mg into the skin every 7 days.  Dispense: 4 pen ; Refill: 2  - LANTUS SOLOSTAR U-100 INSULIN glargine 100 units/mL SubQ pen; SMARTSI Unit(s) SUB-Q Every Evening  Dispense: 15 mL; Refill: 5  - DM foot exam done today  - POC A1c ordered today, but machine broken.     2. Gastroesophageal reflux disease, unspecified whether esophagitis present  - pantoprazole (PROTONIX) 40 MG tablet; Take 1 tablet (40 mg total) by mouth once daily.  Dispense: 90 tablet; Refill: 1    3. Hypertensive heart disease " without congestive heart failure  - aspirin (ECOTRIN) 81 MG EC tablet; Take 1 tablet (81 mg total) by mouth once daily.  Dispense: 90 tablet; Refill: 1  - rosuvastatin (CRESTOR) 20 MG tablet; Take 1 tablet (20 mg total) by mouth once daily.  Dispense: 90 tablet; Refill: 1  - [facility] managing pt's BP meds. Next [facility] appointment in 12/21/23. Pt will call [facility] to possibly schedule for a sooner time slot    4. Chronic low back pain, unspecified back pain laterality, unspecified whether sciatica present  - diclofenac sodium (VOLTAREN) 1 % Gel; See Instructions, APPLY 1 THIN LAYER TOPICALLY 4 TIMES DAILY AS NEEDED FOR PAIN /  NOT  TO  EXCEED  16GRAMS/DAY  SINGLE  JOINT  OF  LOWER  EXTREMITIES, # 300 gm, 1 Refill(s), Pharmacy: [facility], 175, cm, Height/Length Dosing, 08/23/21 10:4...  Dispense: 50 g; Refill: 2  - gabapentin (NEURONTIN) 300 MG capsule; Take 1 capsule (300 mg total) by mouth 2 (two) times daily.  Dispense: 180 capsule; Refill: 1    5. Normocytic anemia  - ferrous sulfate (FEOSOL) 325 mg (65 mg iron) Tab tablet; See Instructions, Take 1 tablet by mouth once daily for 90 days, # 90 tab(s), 3 Refill(s), Pharmacy: [facility], 175, cm, Height/Length Dosing, 08/23/21 10:41:00 CDT, 126, kg, Weight Dosing, 08/23/21 10:41:00 CDT  Dispense: 90 tablet; Refill: 1    6. Seasonal allergies  - fluticasone propionate (FLONASE) 50 mcg/actuation nasal spray; 1 spray (50 mcg total) by Each Nostril route daily as needed for Rhinitis.  Dispense: 18.2 mL; Refill: 1    Health Maintenance:  DEXA ordered  Need Colonoscopy results (Dr. Knott). Will FU.    Follow up:    In 2 months for DM, or earlier if needed. Need A1c.     Aye Davalos MD  [facility] HO-2

## 2023-12-10 NOTE — PROGRESS NOTES
I reviewed History, PE, A/P and chart was reviewed.  Services provided in the outpatient department of  a teaching facility, I was immediately available.  I agree with resident, care reasonable and necessary.   Management discussed with resident at time of visit.    We mainly managed DM today,renal/CARD manage BP - has MATHEW per chart,  Need to call Matt for eye report and Nikos for CRC  Agree with need for FU PAP - no endo cells and ASCUS HPV (-)      Maday Bustamante MD  [facility] - [facility]  [facility]

## 2023-12-27 ENCOUNTER — PATIENT OUTREACH (OUTPATIENT)
Dept: ADMINISTRATIVE | Facility: HOSPITAL | Age: 68
End: 2023-12-27
Payer: MEDICARE

## 2023-12-27 NOTE — PROGRESS NOTES

## 2023-12-27 NOTE — PROGRESS NOTES

## 2024-01-18 ENCOUNTER — OFFICE VISIT (OUTPATIENT)
Dept: FAMILY MEDICINE | Facility: CLINIC | Age: 69
End: 2024-01-18
Payer: MEDICARE

## 2024-01-18 VITALS
TEMPERATURE: 98 F | HEIGHT: 68 IN | OXYGEN SATURATION: 97 % | HEART RATE: 77 BPM | WEIGHT: 276 LBS | DIASTOLIC BLOOD PRESSURE: 71 MMHG | BODY MASS INDEX: 41.83 KG/M2 | SYSTOLIC BLOOD PRESSURE: 136 MMHG

## 2024-01-18 DIAGNOSIS — M10.061 ACUTE IDIOPATHIC GOUT OF RIGHT KNEE: Primary | ICD-10-CM

## 2024-01-18 PROBLEM — R31.29 MICROSCOPIC HEMATURIA: Status: ACTIVE | Noted: 2023-08-23

## 2024-01-18 PROBLEM — D64.9 ANEMIA: Chronic | Status: ACTIVE | Noted: 2023-08-23

## 2024-01-18 PROBLEM — E74.39 OTHER DISORDERS OF INTESTINAL CARBOHYDRATE ABSORPTION: Status: ACTIVE | Noted: 2023-11-27

## 2024-01-18 PROCEDURE — 99215 OFFICE O/P EST HI 40 MIN: CPT | Mod: PBBFAC

## 2024-01-18 RX ORDER — SPIRONOLACTONE 25 MG/1
25 TABLET ORAL
COMMUNITY
Start: 2024-01-17

## 2024-01-18 RX ORDER — PEN NEEDLE, DIABETIC 30 GX3/16"
NEEDLE, DISPOSABLE MISCELLANEOUS
Qty: 90 EACH | Refills: 3 | Status: SHIPPED | OUTPATIENT
Start: 2024-01-18

## 2024-01-18 RX ORDER — CHLORTHALIDONE 25 MG/1
25 TABLET ORAL
COMMUNITY
Start: 2023-12-21 | End: 2024-01-18

## 2024-01-18 RX ORDER — OLMESARTAN MEDOXOMIL 40 MG/1
40 TABLET ORAL
COMMUNITY
Start: 2023-12-11

## 2024-01-18 RX ORDER — PREDNISONE 20 MG/1
40 TABLET ORAL DAILY
Qty: 6 TABLET | Refills: 0 | Status: SHIPPED | OUTPATIENT
Start: 2024-01-18 | End: 2024-01-21

## 2024-01-18 NOTE — PROGRESS NOTES
"[facility] OFFICE VISIT NOTE  Ivania Hatch  [id number]  01/18/2024    Chief Complaint   Patient presents with    MED REFILLS    C/O R KNEE PAIN       Ivania Hatch is a 68 y.o. female with pmh of DM type II and CKD presenting to [facility] for right knee pain x 2 days.    Has a history of gout flares affecting multiple joints, ankle, knee, etc. allopurinol caused a SJS reaction in the past. Has only been able to take prednisone in the past. Right knee is stiff, swollen, and painful. Denies fever, trauma, or falls.    Review of Systems  As per hpi    Vitals:    01/18/24 1528   BP: 136/71   Pulse: 77   Temp: 97.9 °F (36.6 °C)   TempSrc: Oral   SpO2: 97%   Weight: 125.2 kg (276 lb)   Height: 5' 8" (1.727 m)      Physical Exam  General: NAD, pleasant  Cardio: regular rate and rhythm  Pulm: lungs clear to auscultation bilaterally  MSK: right knee larger than left, tender to light palpation, ROM limited due to pain, warm    Current Medications:   Current Outpatient Medications   Medication Sig Dispense Refill    olmesartan (BENICAR) 40 MG tablet Take 40 mg by mouth.      spironolactone (ALDACTONE) 25 MG tablet Take 25 mg by mouth.      amLODIPine (NORVASC) 10 MG tablet Take 10 mg by mouth once daily.      aspirin (ECOTRIN) 81 MG EC tablet Take 1 tablet (81 mg total) by mouth once daily. 90 tablet 1    blood sugar diagnostic Strp 1 each by Misc.(Non-Drug; Combo Route) route 4 (four) times daily with meals and nightly. 200 each 2    carvediloL (COREG) 25 MG tablet Take 25 mg by mouth 2 (two) times daily.      cetirizine (ZYRTEC) 10 MG tablet Take 10 mg by mouth every evening.      cholecalciferol, vitamin D3, 125 mcg (5,000 unit) capsule Take 5,000 Units by mouth.      clotrimazole (LOTRIMIN) 1 % cream Apply topically 2 (two) times daily. 24 g 2    diclofenac sodium (VOLTAREN) 1 % Gel See Instructions, APPLY 1 THIN LAYER TOPICALLY 4 TIMES DAILY AS NEEDED FOR PAIN /  NOT  TO  EXCEED  16GRAMS/DAY  SINGLE  JOINT  OF  LOWER  EXTREMITIES, " "# 300 gm, 1 Refill(s), Pharmacy: [facility], 175, cm, Height/Length Dosing, 21 10:4... 50 g 2    docusate sodium 100 mg capsule Stool Softener 100 mg tablet   Take 1 tablet every day by oral route.      dulaglutide (TRULICITY) 3 mg/0.5 mL pen injector Inject 3 mg into the skin every 7 days. 4 pen 2    ferrous sulfate (FEOSOL) 325 mg (65 mg iron) Tab tablet See Instructions, Take 1 tablet by mouth once daily for 90 days, # 90 tab(s), 3 Refill(s), Pharmacy: [facility], 175, cm, Height/Length Dosing, 21 10:41:00 CDT, 126, kg, Weight Dosing, 21 10:41:00 CDT 90 tablet 1    fluticasone propionate (FLONASE) 50 mcg/actuation nasal spray 1 spray (50 mcg total) by Each Nostril route daily as needed for Rhinitis. 18.2 mL 1    furosemide (LASIX) 80 MG tablet Take 80 mg by mouth every other day.      gabapentin (NEURONTIN) 300 MG capsule Take 1 capsule (300 mg total) by mouth 2 (two) times daily. 180 capsule 1    garlic 1,000 mg Cap Take 1,000 mg by mouth.      hydrALAZINE (APRESOLINE) 100 MG tablet Take 100 mg by mouth 3 (three) times daily.      isosorbide mononitrate (IMDUR) 60 MG 24 hr tablet Take 60 mg by mouth.      LANTUS SOLOSTAR U-100 INSULIN glargine 100 units/mL SubQ pen SMARTSI Unit(s) SUB-Q Every Evening 15 mL 5    LUMIGAN 0.01 % Drop Place 1 drop into both eyes every evening.      MIRALAX 17 gram/dose powder Take 17 g by mouth.      pantoprazole (PROTONIX) 40 MG tablet Take 1 tablet (40 mg total) by mouth once daily. 90 tablet 1    pen needle, diabetic 32 gauge x 5/32" Ndle USE AS DIRECTED ONCE DAILY 90 each 3    predniSONE (DELTASONE) 20 MG tablet Take 2 tablets (40 mg total) by mouth once daily. for 3 days 6 tablet 0    rosuvastatin (CRESTOR) 20 MG tablet Take 1 tablet (20 mg total) by mouth once daily. 90 tablet 1    timolol maleate 0.5% (TIMOPTIC) 0.5 % Drop Place 1 drop into both eyes 2 (two) times daily.       No current facility-administered medications for this " visit.       Assessment:   1. Acute idiopathic gout of right knee  Discussed avoidance of trigger factors for gout flares  Prednisone 40 mg x 3 days.  Monitor blood sugars, if >250 stop medication and return to clinic      Return to clinic in 1 month for routine follow up, or sooner if needed.     Juancho Ng DO  [employer]  Resident, HO-1

## 2024-01-23 DIAGNOSIS — Z79.4 TYPE 2 DIABETES MELLITUS WITHOUT COMPLICATION, WITH LONG-TERM CURRENT USE OF INSULIN: Primary | ICD-10-CM

## 2024-01-23 DIAGNOSIS — E11.9 TYPE 2 DIABETES MELLITUS WITHOUT COMPLICATION, WITH LONG-TERM CURRENT USE OF INSULIN: Primary | ICD-10-CM

## 2024-02-14 DIAGNOSIS — E11.9 TYPE 2 DIABETES MELLITUS WITHOUT COMPLICATION, WITH LONG-TERM CURRENT USE OF INSULIN: ICD-10-CM

## 2024-02-14 DIAGNOSIS — Z79.4 TYPE 2 DIABETES MELLITUS WITHOUT COMPLICATION, WITH LONG-TERM CURRENT USE OF INSULIN: ICD-10-CM

## 2024-02-14 RX ORDER — INSULIN GLARGINE 100 [IU]/ML
INJECTION, SOLUTION SUBCUTANEOUS
Qty: 15 ML | Refills: 5 | Status: SHIPPED | OUTPATIENT
Start: 2024-02-14 | End: 2024-04-18 | Stop reason: SDUPTHER

## 2024-02-19 ENCOUNTER — OFFICE VISIT (OUTPATIENT)
Dept: FAMILY MEDICINE | Facility: CLINIC | Age: 69
End: 2024-02-19
Payer: MEDICARE

## 2024-02-19 VITALS
RESPIRATION RATE: 18 BRPM | BODY MASS INDEX: 41.37 KG/M2 | TEMPERATURE: 98 F | WEIGHT: 273 LBS | HEART RATE: 82 BPM | OXYGEN SATURATION: 99 % | DIASTOLIC BLOOD PRESSURE: 85 MMHG | SYSTOLIC BLOOD PRESSURE: 152 MMHG | HEIGHT: 68 IN

## 2024-02-19 DIAGNOSIS — Z79.4 TYPE 2 DIABETES MELLITUS WITHOUT COMPLICATION, WITH LONG-TERM CURRENT USE OF INSULIN: Primary | ICD-10-CM

## 2024-02-19 DIAGNOSIS — Z78.0 POSTMENOPAUSAL: ICD-10-CM

## 2024-02-19 DIAGNOSIS — M10.9 GOUT, UNSPECIFIED CAUSE, UNSPECIFIED CHRONICITY, UNSPECIFIED SITE: ICD-10-CM

## 2024-02-19 DIAGNOSIS — M85.80 OSTEOPENIA, UNSPECIFIED LOCATION: ICD-10-CM

## 2024-02-19 DIAGNOSIS — E11.9 TYPE 2 DIABETES MELLITUS WITHOUT COMPLICATION, WITH LONG-TERM CURRENT USE OF INSULIN: Primary | ICD-10-CM

## 2024-02-19 LAB — HBA1C MFR BLD: 7.5 %

## 2024-02-19 PROCEDURE — 99215 OFFICE O/P EST HI 40 MIN: CPT | Mod: PBBFAC

## 2024-02-19 PROCEDURE — 83036 HEMOGLOBIN GLYCOSYLATED A1C: CPT | Mod: PBBFAC

## 2024-02-19 RX ORDER — PREDNISONE 20 MG/1
TABLET ORAL
Qty: 6 TABLET | Refills: 0 | Status: SHIPPED | OUTPATIENT
Start: 2024-02-19

## 2024-02-19 RX ORDER — PREDNISONE 20 MG/1
20 TABLET ORAL DAILY
Qty: 6 TABLET | Refills: 0 | Status: SHIPPED | OUTPATIENT
Start: 2024-02-19 | End: 2024-02-19

## 2024-02-19 RX ORDER — FUROSEMIDE 40 MG/1
40 TABLET ORAL
COMMUNITY
Start: 2024-01-18

## 2024-02-19 NOTE — PROGRESS NOTES
Chief Complaint  Diabetes and Sore Throat      History of Present Illness  Ivania Hatch is a 68 y.o.  female presents to the clinic for routine f/u.     Interval Hx: was last seen in [facility] 1/18/24 for acute gout flare over multiple joints, worst over R knee. Was given prednisone 40 x3 days. Reports gout pain much improved. Requesting prednisone Rx prophylactically, as she is going to [place] next month for 2 weeks.     Acute concerns: None    Chronic conditions:  DM II  Adherent to Trulicity 3 mg, Lantus 53 qhs  Discontinued Glimepiride 4mg daily because of CKD  States FBG 120s at home   -A1c 7.5 9/2023. Due.  -hypoglycemia episodes: none  Microalbumin/cr: 12/4/23, wnl  DM Foot exam: 12/4/23  DM Eye exam: Matt, done 10/23/23    HTN -follows [facility] and [facility]. [facility] manages her HTN meds. Losartan 100 mg daily, Coreg 25 mg bid, amlodipine 10 mg daily, Imdur 60mg, Hydralazine 100mg, Lasix dose lowered to 40 every other day from 80mg qd. No chest pain, SOB, vision changes, HA's.   HLD- rosuvastatin 20mg daily, LDL 68 11/2023.   Chronic Gout -see above for recent flare. Prednisone 20mg x 5 days works for acute flares. 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   CKD- follow with Dr. Foster (nephrology)  GERD- protonix 40  Normocytic anemia- on PO Fe.     Health Maintenance:  Cervical CA: Pap 6/2023 ASCUS HPV neg. Need repeat in 12 months. OK to do it next visit  Breast CA: 10/25/23 Right Breast: Negative (BI-RADS 1) Left Breast: Negative (BI-RADS 1)  Colon CA: has FH colon cancer (father). Colonoscopy 11/2023, polypectomy x1 with Dr. Knott, rec repeat in 5 years  DEXA: 9/2021: osteopenia. No FH of hip fractures. Repeat ordered but not done. Frax risk not elevated.    Lung CA: quit 30 years ago. About 5 pack years of smoking.     ROS per HPI    Vitals:    02/19/24 1623   BP: (!) 152/85   Pulse: 82   Resp: 18   Temp: 97.7 °F (36.5 °C)   Physical Exam:  General: appears well, in no acute distress   HENT: MMM, oropharynx without  erythema/exudate   Neck: supple, no lymphadenopathy  Respiratory: clear to auscultation bilaterally, nonlabored respirations   Cardiovascular: regular rate and rhythm without murmurs or gallops, no edema in bilateral lower extremities   Gastrointestinal: soft, non-tender, non-distended, bowel sounds present   Musculoskeletal: no gross deformities observed. No TTP over bilateral knees      Current Outpatient Medications  Current Outpatient Medications   Medication Instructions    amLODIPine (NORVASC) 10 mg, Oral, Daily    aspirin (ECOTRIN) 81 mg, Oral, Daily    blood sugar diagnostic Strp 1 each, Misc.(Non-Drug; Combo Route), 3 times daily    carvediloL (COREG) 25 mg, Oral, 2 times daily    cetirizine (ZYRTEC) 10 mg, Oral, Nightly    cholecalciferol (vitamin D3) 5,000 Units, Oral    clotrimazole (LOTRIMIN) 1 % cream Topical (Top), 2 times daily    diclofenac sodium (VOLTAREN) 1 % Gel <BR>See Instructions, APPLY 1 THIN LAYER TOPICALLY 4 TIMES DAILY AS NEEDED FOR PAIN /  NOT  TO  EXCEED  16GRAMS/DAY  SINGLE  JOINT  OF  LOWER  EXTREMITIES, # 300 gm, 1 Refill(s), Pharmacy: [facility], 175, cm, Height/Length Dosing, 21 10:4...    docusate sodium 100 mg capsule Stool Softener 100 mg tablet   Take 1 tablet every day by oral route.    ferrous sulfate (FEOSOL) 325 mg (65 mg iron) Tab tablet <BR>See Instructions, Take 1 tablet by mouth once daily for 90 days, # 90 tab(s), 3 Refill(s), Pharmacy: [facility], 175, cm, Height/Length Dosing, 21 10:41:00 CDT, 126, kg, Weight Dosing, 21 10:41:00 CDT    fluticasone propionate (FLONASE) 50 mcg, Each Nostril, Daily PRN    furosemide (LASIX) 40 mg, Oral    gabapentin (NEURONTIN) 300 mg, Oral, 2 times daily    garlic 1,000 mg, Oral    hydrALAZINE (APRESOLINE) 100 mg, Oral, 3 times daily    isosorbide mononitrate (IMDUR) 60 mg, Oral    LANTUS SOLOSTAR U-100 INSULIN glargine 100 units/mL SubQ pen SMARTSI Unit(s) SUB-Q Every Evening    LUMIGAN 0.01 %  "Drop 1 drop, Both Eyes, Nightly    MIRALAX 17 g, Oral    olmesartan (BENICAR) 40 mg, Oral    pantoprazole (PROTONIX) 40 mg, Oral, Daily    pen needle, diabetic 32 gauge x 5/32" Ndle USE AS DIRECTED ONCE DAILY    predniSONE (DELTASONE) 20 MG tablet Take 1 tablet (20 mg total) by mouth once daily when having acute gout flare    rosuvastatin (CRESTOR) 20 mg, Oral, Daily    spironolactone (ALDACTONE) 25 mg, Oral    timolol maleate 0.5% (TIMOPTIC) 0.5 % Drop 1 drop, Both Eyes, 2 times daily    TRULICITY 3 mg, Subcutaneous, Every 7 days       Assessment / Plan:  1. Type 2 diabetes mellitus without complication, with long-term current use of insulin  - POCT HEMOGLOBIN A1C today 7.5, unchanged from last time  - continue current DM regimen    2. Osteopenia, unspecified location  - DXA Bone Density Axial Skeleton 1 or more sites; Future    3. Gout, unspecified cause, unspecified chronicity, unspecified site  - predniSONE (DELTASONE) 20 MG tablet; Take 1 tablet (20 mg total) by mouth once daily when having acute gout flare  Dispense: 6 tablet; Refill: 0  - [place]. Advised to continue to measure her BG vigilantly especially if she has to take steroids    Follow up:  RTC in 3-4 months. Pap next visit.     Update: Patient decided to leave before being seen by staff.     Aye Davalos MD  [facility] HO-2    "

## 2024-02-20 NOTE — PROGRESS NOTES
I reviewed History, PE, A/P and chart was reviewed.  Services provided in the outpatient department of  a teaching facility, I was immediately available.  I agree with resident, care reasonable and necessary.   Management discussed with resident at time of visit.    Resident messaged to address sore throat    Maday Bustamante MD  [facility] - [facility]  [facility]    Per resident  Mild sore throat, told her to use salt water gargle or listerine if she wants, she'd done that before in the past and it would work. Exam normal, posterior oropharynx no erythema or exudates or swelling, no fever. I told her at her age and her symptoms not likely strep and to do salt water gargle. Centor 0

## 2024-02-26 ENCOUNTER — LAB VISIT (OUTPATIENT)
Dept: LAB | Facility: HOSPITAL | Age: 69
End: 2024-02-26
Attending: INTERNAL MEDICINE
Payer: MEDICARE

## 2024-02-26 DIAGNOSIS — I12.9 PARENCHYMAL RENAL HYPERTENSION: ICD-10-CM

## 2024-02-26 DIAGNOSIS — G63 GOUTY NEURITIS: ICD-10-CM

## 2024-02-26 DIAGNOSIS — M10.00 GOUTY NEURITIS: ICD-10-CM

## 2024-02-26 DIAGNOSIS — E11.9 DIABETES MELLITUS WITHOUT COMPLICATION: ICD-10-CM

## 2024-02-26 DIAGNOSIS — N18.32 CHRONIC KIDNEY DISEASE (CKD) STAGE G3B/A1, MODERATELY DECREASED GLOMERULAR FILTRATION RATE (GFR) BETWEEN 30-44 ML/MIN/1.73 SQUARE METER AND ALBUMINURIA CREATININE RATIO LESS THAN 30 MG/G: Primary | ICD-10-CM

## 2024-02-26 DIAGNOSIS — D64.9 ANEMIA, UNSPECIFIED TYPE: ICD-10-CM

## 2024-02-26 LAB
ALBUMIN SERPL-MCNC: 3.6 G/DL (ref 3.4–4.8)
ALBUMIN/GLOB SERPL: 1 RATIO (ref 1.1–2)
ALP SERPL-CCNC: 81 UNIT/L (ref 40–150)
ALT SERPL-CCNC: 12 UNIT/L (ref 0–55)
AST SERPL-CCNC: 12 UNIT/L (ref 5–34)
BASOPHILS # BLD AUTO: 0.02 X10(3)/MCL
BASOPHILS NFR BLD AUTO: 0.2 %
BILIRUB SERPL-MCNC: 0.2 MG/DL
BUN SERPL-MCNC: 21.7 MG/DL (ref 9.8–20.1)
CALCIUM SERPL-MCNC: 9.9 MG/DL (ref 8.4–10.2)
CHLORIDE SERPL-SCNC: 106 MMOL/L (ref 98–107)
CO2 SERPL-SCNC: 24 MMOL/L (ref 23–31)
CREAT SERPL-MCNC: 1.58 MG/DL (ref 0.55–1.02)
EOSINOPHIL # BLD AUTO: 0.21 X10(3)/MCL (ref 0–0.9)
EOSINOPHIL NFR BLD AUTO: 2.4 %
ERYTHROCYTE [DISTWIDTH] IN BLOOD BY AUTOMATED COUNT: 14.9 % (ref 11.5–17)
GFR SERPLBLD CREATININE-BSD FMLA CKD-EPI: 35 MLS/MIN/1.73/M2
GLOBULIN SER-MCNC: 3.5 GM/DL (ref 2.4–3.5)
GLUCOSE SERPL-MCNC: 214 MG/DL (ref 82–115)
HCT VFR BLD AUTO: 34.4 % (ref 37–47)
HGB BLD-MCNC: 10.6 G/DL (ref 12–16)
IMM GRANULOCYTES # BLD AUTO: 0.01 X10(3)/MCL (ref 0–0.04)
IMM GRANULOCYTES NFR BLD AUTO: 0.1 %
LYMPHOCYTES # BLD AUTO: 2.04 X10(3)/MCL (ref 0.6–4.6)
LYMPHOCYTES NFR BLD AUTO: 23.2 %
MAGNESIUM SERPL-MCNC: 1.8 MG/DL (ref 1.6–2.6)
MCH RBC QN AUTO: 26 PG (ref 27–31)
MCHC RBC AUTO-ENTMCNC: 30.8 G/DL (ref 33–36)
MCV RBC AUTO: 84.5 FL (ref 80–94)
MONOCYTES # BLD AUTO: 0.54 X10(3)/MCL (ref 0.1–1.3)
MONOCYTES NFR BLD AUTO: 6.1 %
NEUTROPHILS # BLD AUTO: 5.97 X10(3)/MCL (ref 2.1–9.2)
NEUTROPHILS NFR BLD AUTO: 68 %
PHOSPHATE SERPL-MCNC: 2.9 MG/DL (ref 2.3–4.7)
PLATELET # BLD AUTO: 242 X10(3)/MCL (ref 130–400)
PMV BLD AUTO: 11.5 FL (ref 7.4–10.4)
POTASSIUM SERPL-SCNC: 4.2 MMOL/L (ref 3.5–5.1)
PROT SERPL-MCNC: 7.1 GM/DL (ref 5.8–7.6)
RBC # BLD AUTO: 4.07 X10(6)/MCL (ref 4.2–5.4)
SODIUM SERPL-SCNC: 141 MMOL/L (ref 136–145)
URATE SERPL-MCNC: 9.9 MG/DL (ref 2.6–6)
WBC # SPEC AUTO: 8.79 X10(3)/MCL (ref 4.5–11.5)

## 2024-02-26 PROCEDURE — 83970 ASSAY OF PARATHORMONE: CPT

## 2024-02-26 PROCEDURE — 85025 COMPLETE CBC W/AUTO DIFF WBC: CPT

## 2024-02-26 PROCEDURE — 84100 ASSAY OF PHOSPHORUS: CPT

## 2024-02-26 PROCEDURE — 80053 COMPREHEN METABOLIC PANEL: CPT

## 2024-02-26 PROCEDURE — 83735 ASSAY OF MAGNESIUM: CPT

## 2024-02-26 PROCEDURE — 82306 VITAMIN D 25 HYDROXY: CPT

## 2024-02-26 PROCEDURE — 84550 ASSAY OF BLOOD/URIC ACID: CPT

## 2024-02-26 PROCEDURE — 36415 COLL VENOUS BLD VENIPUNCTURE: CPT

## 2024-02-27 LAB
DEPRECATED CALCIDIOL+CALCIFEROL SERPL-MC: 39.8 NG/ML (ref 30–80)
PTH-INTACT SERPL-MCNC: 279.9 PG/ML (ref 8.7–77)

## 2024-03-26 ENCOUNTER — LAB VISIT (OUTPATIENT)
Dept: LAB | Facility: HOSPITAL | Age: 69
End: 2024-03-26
Attending: INTERNAL MEDICINE
Payer: MEDICARE

## 2024-03-26 DIAGNOSIS — M10.00 GOUTY NEURITIS: ICD-10-CM

## 2024-03-26 DIAGNOSIS — E11.9 DIABETES MELLITUS WITHOUT COMPLICATION: ICD-10-CM

## 2024-03-26 DIAGNOSIS — D64.9 ANEMIA, UNSPECIFIED TYPE: ICD-10-CM

## 2024-03-26 DIAGNOSIS — E55.9 AVITAMINOSIS D: ICD-10-CM

## 2024-03-26 DIAGNOSIS — I12.9 PARENCHYMAL RENAL HYPERTENSION: ICD-10-CM

## 2024-03-26 DIAGNOSIS — E66.8 HYPERPLASTIC-HYPERTROPHIC OBESITY: ICD-10-CM

## 2024-03-26 DIAGNOSIS — G63 GOUTY NEURITIS: ICD-10-CM

## 2024-03-26 DIAGNOSIS — E74.39 ACQUIRED MONOSACCHARIDE MALABSORPTION: ICD-10-CM

## 2024-03-26 DIAGNOSIS — E78.5 HYPERLIPIDEMIA, UNSPECIFIED HYPERLIPIDEMIA TYPE: ICD-10-CM

## 2024-03-26 DIAGNOSIS — N18.32 CHRONIC KIDNEY DISEASE (CKD) STAGE G3B/A1, MODERATELY DECREASED GLOMERULAR FILTRATION RATE (GFR) BETWEEN 30-44 ML/MIN/1.73 SQUARE METER AND ALBUMINURIA CREATININE RATIO LESS THAN 30 MG/G: Primary | ICD-10-CM

## 2024-03-26 LAB
ALBUMIN SERPL-MCNC: 3.6 G/DL (ref 3.4–4.8)
ALBUMIN/GLOB SERPL: 1.1 RATIO (ref 1.1–2)
ALP SERPL-CCNC: 83 UNIT/L (ref 40–150)
ALT SERPL-CCNC: 13 UNIT/L (ref 0–55)
AST SERPL-CCNC: 10 UNIT/L (ref 5–34)
BASOPHILS # BLD AUTO: 0.02 X10(3)/MCL
BASOPHILS NFR BLD AUTO: 0.2 %
BILIRUB SERPL-MCNC: 0.2 MG/DL
BUN SERPL-MCNC: 26.5 MG/DL (ref 9.8–20.1)
CALCIUM SERPL-MCNC: 9.7 MG/DL (ref 8.4–10.2)
CHLORIDE SERPL-SCNC: 108 MMOL/L (ref 98–107)
CO2 SERPL-SCNC: 23 MMOL/L (ref 23–31)
CREAT SERPL-MCNC: 2.12 MG/DL (ref 0.55–1.02)
EOSINOPHIL # BLD AUTO: 0.25 X10(3)/MCL (ref 0–0.9)
EOSINOPHIL NFR BLD AUTO: 2.5 %
ERYTHROCYTE [DISTWIDTH] IN BLOOD BY AUTOMATED COUNT: 14.8 % (ref 11.5–17)
GFR SERPLBLD CREATININE-BSD FMLA CKD-EPI: 25 MLS/MIN/1.73/M2
GLOBULIN SER-MCNC: 3.4 GM/DL (ref 2.4–3.5)
GLUCOSE SERPL-MCNC: 207 MG/DL (ref 82–115)
HCT VFR BLD AUTO: 35.3 % (ref 37–47)
HGB BLD-MCNC: 10.7 G/DL (ref 12–16)
IMM GRANULOCYTES # BLD AUTO: 0.02 X10(3)/MCL (ref 0–0.04)
IMM GRANULOCYTES NFR BLD AUTO: 0.2 %
LYMPHOCYTES # BLD AUTO: 2.55 X10(3)/MCL (ref 0.6–4.6)
LYMPHOCYTES NFR BLD AUTO: 25.4 %
MAGNESIUM SERPL-MCNC: 2.5 MG/DL (ref 1.6–2.6)
MCH RBC QN AUTO: 25.7 PG (ref 27–31)
MCHC RBC AUTO-ENTMCNC: 30.3 G/DL (ref 33–36)
MCV RBC AUTO: 84.7 FL (ref 80–94)
MONOCYTES # BLD AUTO: 0.7 X10(3)/MCL (ref 0.1–1.3)
MONOCYTES NFR BLD AUTO: 7 %
NEUTROPHILS # BLD AUTO: 6.48 X10(3)/MCL (ref 2.1–9.2)
NEUTROPHILS NFR BLD AUTO: 64.7 %
PHOSPHATE SERPL-MCNC: 3 MG/DL (ref 2.3–4.7)
PLATELET # BLD AUTO: 250 X10(3)/MCL (ref 130–400)
PMV BLD AUTO: 11.7 FL (ref 7.4–10.4)
POTASSIUM SERPL-SCNC: 4.5 MMOL/L (ref 3.5–5.1)
PROT SERPL-MCNC: 7 GM/DL (ref 5.8–7.6)
PTH-INTACT SERPL-MCNC: 272.9 PG/ML (ref 8.7–77)
RBC # BLD AUTO: 4.17 X10(6)/MCL (ref 4.2–5.4)
SODIUM SERPL-SCNC: 140 MMOL/L (ref 136–145)
WBC # SPEC AUTO: 10.02 X10(3)/MCL (ref 4.5–11.5)

## 2024-03-26 PROCEDURE — 80053 COMPREHEN METABOLIC PANEL: CPT

## 2024-03-26 PROCEDURE — 85025 COMPLETE CBC W/AUTO DIFF WBC: CPT

## 2024-03-26 PROCEDURE — 84100 ASSAY OF PHOSPHORUS: CPT

## 2024-03-26 PROCEDURE — 83735 ASSAY OF MAGNESIUM: CPT

## 2024-03-26 PROCEDURE — 83970 ASSAY OF PARATHORMONE: CPT

## 2024-03-26 PROCEDURE — 36415 COLL VENOUS BLD VENIPUNCTURE: CPT

## 2024-03-28 ENCOUNTER — OFFICE VISIT (OUTPATIENT)
Dept: FAMILY MEDICINE | Facility: CLINIC | Age: 69
End: 2024-03-28
Payer: MEDICARE

## 2024-03-28 VITALS
RESPIRATION RATE: 18 BRPM | OXYGEN SATURATION: 96 % | TEMPERATURE: 99 F | SYSTOLIC BLOOD PRESSURE: 115 MMHG | BODY MASS INDEX: 41.65 KG/M2 | HEART RATE: 80 BPM | DIASTOLIC BLOOD PRESSURE: 69 MMHG | HEIGHT: 68 IN | WEIGHT: 274.81 LBS

## 2024-03-28 DIAGNOSIS — E11.9 TYPE 2 DIABETES MELLITUS WITHOUT COMPLICATION, WITH LONG-TERM CURRENT USE OF INSULIN: Primary | ICD-10-CM

## 2024-03-28 DIAGNOSIS — D64.9 ANEMIA, UNSPECIFIED TYPE: Chronic | ICD-10-CM

## 2024-03-28 DIAGNOSIS — Z79.4 TYPE 2 DIABETES MELLITUS WITHOUT COMPLICATION, WITH LONG-TERM CURRENT USE OF INSULIN: Primary | ICD-10-CM

## 2024-03-28 DIAGNOSIS — N18.4 STAGE 4 CHRONIC KIDNEY DISEASE: ICD-10-CM

## 2024-03-28 DIAGNOSIS — E66.01 CLASS 3 SEVERE OBESITY DUE TO EXCESS CALORIES WITH SERIOUS COMORBIDITY AND BODY MASS INDEX (BMI) OF 40.0 TO 44.9 IN ADULT: ICD-10-CM

## 2024-03-28 DIAGNOSIS — I10 ESSENTIAL HYPERTENSION: ICD-10-CM

## 2024-03-28 PROCEDURE — 99215 OFFICE O/P EST HI 40 MIN: CPT | Mod: PBBFAC

## 2024-03-28 RX ORDER — CHLORTHALIDONE 25 MG/1
25 TABLET ORAL
COMMUNITY
Start: 2024-03-16

## 2024-03-28 RX ORDER — EMPAGLIFLOZIN 25 MG/1
25 TABLET, FILM COATED ORAL EVERY MORNING
COMMUNITY
Start: 2024-03-05

## 2024-03-28 RX ORDER — LANCETS
EACH MISCELLANEOUS
COMMUNITY
Start: 2024-02-18

## 2024-03-28 NOTE — PROGRESS NOTES
"[facility] [place]  Ivania Hatch  [id number]  03/28/2024    Chief Complaint   Patient presents with    Diabetes    Medication Refill       Ivania Hatch is a 68 y.o. female  presenting to [facility] for Diabetes follow up.    HPI  Interval hx: started on Jardiance 25 daily per Dr. Hill. Lasix changed to 40 mg as needed. Also [facility] discontinued Hydralazine 100 mg in January and started spironolactone 25 mg.     Of note, she states she ran out of insulin early this month after adjusting dosage for when sugars were higher.      Review of Systems  As per hpi    Vitals:    03/28/24 1340   BP: 115/69   Pulse: 80   Resp: 18   Temp: 98.5 °F (36.9 °C)   TempSrc: Oral   SpO2: 96%   Weight: 124.6 kg (274 lb 12.8 oz)   Height: 5' 8" (1.727 m)        Physical Exam  Vitals reviewed.   Constitutional:       Appearance: Normal appearance.   HENT:      Head: Normocephalic.   Eyes:      Extraocular Movements: Extraocular movements intact.      Conjunctiva/sclera: Conjunctivae normal.   Cardiovascular:      Rate and Rhythm: Normal rate and regular rhythm.      Heart sounds: Normal heart sounds.   Pulmonary:      Breath sounds: Normal breath sounds.   Skin:     General: Skin is warm and dry.      Comments: Areas of hypopigmentation to bilateral hands, face, and neck.   Neurological:      General: No focal deficit present.      Mental Status: She is alert.   Psychiatric:         Mood and Affect: Mood normal.          Current Medications:   Current Outpatient Medications   Medication Sig Dispense Refill    chlorthalidone (HYGROTEN) 25 MG Tab Take 25 mg by mouth.      JARDIANCE 25 mg tablet Take 25 mg by mouth every morning.      MICROLET LANCET Misc USE 1 LANCET TO CHECK GLUCOSE THREE TIMES DAILY      amLODIPine (NORVASC) 10 MG tablet Take 10 mg by mouth once daily.      aspirin (ECOTRIN) 81 MG EC tablet Take 1 tablet (81 mg total) by mouth once daily. 90 tablet 1    blood sugar diagnostic Strp 1 each by Misc.(Non-Drug; Combo Route) " route 3 (three) times daily. 200 each 2    carvediloL (COREG) 25 MG tablet Take 25 mg by mouth 2 (two) times daily.      cetirizine (ZYRTEC) 10 MG tablet Take 10 mg by mouth every evening.      cholecalciferol, vitamin D3, 125 mcg (5,000 unit) capsule Take 5,000 Units by mouth.      clotrimazole (LOTRIMIN) 1 % cream Apply topically 2 (two) times daily. 24 g 2    diclofenac sodium (VOLTAREN) 1 % Gel See Instructions, APPLY 1 THIN LAYER TOPICALLY 4 TIMES DAILY AS NEEDED FOR PAIN /  NOT  TO  EXCEED  16GRAMS/DAY  SINGLE  JOINT  OF  LOWER  EXTREMITIES, # 300 gm, 1 Refill(s), Pharmacy: [facility], 175, cm, Height/Length Dosing, 21 10:4... 50 g 2    docusate sodium 100 mg capsule Stool Softener 100 mg tablet   Take 1 tablet every day by oral route.      dulaglutide (TRULICITY) 3 mg/0.5 mL pen injector Inject 3 mg into the skin every 7 days. 4 pen 2    ferrous sulfate (FEOSOL) 325 mg (65 mg iron) Tab tablet See Instructions, Take 1 tablet by mouth once daily for 90 days, # 90 tab(s), 3 Refill(s), Pharmacy: [facility], 175, cm, Height/Length Dosing, 21 10:41:00 CDT, 126, kg, Weight Dosing, 21 10:41:00 CDT 90 tablet 1    fluticasone propionate (FLONASE) 50 mcg/actuation nasal spray 1 spray (50 mcg total) by Each Nostril route daily as needed for Rhinitis. 18.2 mL 1    furosemide (LASIX) 40 MG tablet Take 40 mg by mouth.      gabapentin (NEURONTIN) 300 MG capsule Take 1 capsule (300 mg total) by mouth 2 (two) times daily. 180 capsule 1    garlic 1,000 mg Cap Take 1,000 mg by mouth.      isosorbide mononitrate (IMDUR) 60 MG 24 hr tablet Take 60 mg by mouth.      LANTUS SOLOSTAR U-100 INSULIN glargine 100 units/mL SubQ pen SMARTSI Unit(s) SUB-Q Every Evening 15 mL 5    LUMIGAN 0.01 % Drop Place 1 drop into both eyes every evening.      MIRALAX 17 gram/dose powder Take 17 g by mouth.      olmesartan (BENICAR) 40 MG tablet Take 40 mg by mouth.      pantoprazole (PROTONIX) 40 MG tablet Take  "1 tablet (40 mg total) by mouth once daily. 90 tablet 1    pen needle, diabetic 32 gauge x 5/32" Ndle USE AS DIRECTED ONCE DAILY 90 each 3    predniSONE (DELTASONE) 20 MG tablet Take 1 tablet (20 mg total) by mouth once daily when having acute gout flare 6 tablet 0    rosuvastatin (CRESTOR) 20 MG tablet Take 1 tablet (20 mg total) by mouth once daily. 90 tablet 1    spironolactone (ALDACTONE) 25 MG tablet Take 25 mg by mouth.      timolol maleate 0.5% (TIMOPTIC) 0.5 % Drop Place 1 drop into both eyes 2 (two) times daily.       No current facility-administered medications for this visit.       Assessment:   1. Type 2 diabetes mellitus without complication, with long-term current use of insulin  Encouraged lifestyle modification. Provided a list of recipes. Encouraged walking with group of friends starting low and progressing distance  Continue insulin at current dose of 53 units, do not increase dose based on blood sugar levels.   Keep blood sugar log with addition of jardiance  Per renal goal HbA1c of 5, currently at 7.5    2. Stage 4 chronic kidney disease  Not on dialysis, managed per Dr. Hill  Medications changes as listed above    3. Essential hypertension  At goal, medication managed by [facility]  Medication changes as listed above    4. Morbid obesity with BMI of 45.0-49.9, adult  Lifestyle modification as above     5. Anemia, unspecified type   Stable, cbc done 3/26 hgb of 10.7,   Continue current treatment plan         Follow up in about 3 months (around 6/28/2024).     Juancho Ng DO  [facility] Resident, HO-1     "

## 2024-04-15 DIAGNOSIS — E11.9 TYPE 2 DIABETES MELLITUS WITHOUT COMPLICATION, WITH LONG-TERM CURRENT USE OF INSULIN: ICD-10-CM

## 2024-04-15 DIAGNOSIS — Z79.4 TYPE 2 DIABETES MELLITUS WITHOUT COMPLICATION, WITH LONG-TERM CURRENT USE OF INSULIN: ICD-10-CM

## 2024-04-15 RX ORDER — DULAGLUTIDE 3 MG/.5ML
3 INJECTION, SOLUTION SUBCUTANEOUS
Qty: 4 PEN | Refills: 2 | Status: SHIPPED | OUTPATIENT
Start: 2024-04-15 | End: 2024-04-23 | Stop reason: SDUPTHER

## 2024-04-18 DIAGNOSIS — Z79.4 TYPE 2 DIABETES MELLITUS WITHOUT COMPLICATION, WITH LONG-TERM CURRENT USE OF INSULIN: ICD-10-CM

## 2024-04-18 DIAGNOSIS — E11.9 TYPE 2 DIABETES MELLITUS WITHOUT COMPLICATION, WITH LONG-TERM CURRENT USE OF INSULIN: ICD-10-CM

## 2024-04-18 RX ORDER — INSULIN GLARGINE 100 [IU]/ML
INJECTION, SOLUTION SUBCUTANEOUS
Qty: 15 ML | Refills: 5 | Status: SHIPPED | OUTPATIENT
Start: 2024-04-18

## 2024-04-23 ENCOUNTER — TELEPHONE (OUTPATIENT)
Dept: HEPATOLOGY | Facility: HOSPITAL | Age: 69
End: 2024-04-23
Payer: MEDICARE

## 2024-04-23 DIAGNOSIS — E11.9 TYPE 2 DIABETES MELLITUS WITHOUT COMPLICATION, WITH LONG-TERM CURRENT USE OF INSULIN: ICD-10-CM

## 2024-04-23 DIAGNOSIS — Z79.4 TYPE 2 DIABETES MELLITUS WITHOUT COMPLICATION, WITH LONG-TERM CURRENT USE OF INSULIN: ICD-10-CM

## 2024-04-23 RX ORDER — DULAGLUTIDE 3 MG/.5ML
3 INJECTION, SOLUTION SUBCUTANEOUS
Qty: 4 PEN | Refills: 2 | Status: SHIPPED | OUTPATIENT
Start: 2024-04-23 | End: 2025-04-23

## 2024-05-02 DIAGNOSIS — J30.2 SEASONAL ALLERGIES: ICD-10-CM

## 2024-05-02 RX ORDER — FLUTICASONE PROPIONATE 50 MCG
1 SPRAY, SUSPENSION (ML) NASAL DAILY PRN
Qty: 18.2 ML | Refills: 1 | Status: SHIPPED | OUTPATIENT
Start: 2024-05-02 | End: 2024-05-07 | Stop reason: SDUPTHER

## 2024-05-07 ENCOUNTER — TELEPHONE (OUTPATIENT)
Dept: SURGERY | Facility: HOSPITAL | Age: 69
End: 2024-05-07
Payer: MEDICARE

## 2024-05-07 DIAGNOSIS — J30.2 SEASONAL ALLERGIES: ICD-10-CM

## 2024-05-07 RX ORDER — FLUTICASONE PROPIONATE 50 MCG
1 SPRAY, SUSPENSION (ML) NASAL DAILY PRN
Qty: 48 ML | Refills: 1 | Status: SHIPPED | OUTPATIENT
Start: 2024-05-07

## 2024-05-07 NOTE — TELEPHONE ENCOUNTER
Received message from pharmacy requesting rx updated to 3 month supply per patient request. 3 month supply sent.

## 2024-05-21 ENCOUNTER — LAB VISIT (OUTPATIENT)
Dept: LAB | Facility: HOSPITAL | Age: 69
End: 2024-05-21
Attending: INTERNAL MEDICINE
Payer: MEDICARE

## 2024-05-21 DIAGNOSIS — D64.9 ANEMIA, UNSPECIFIED TYPE: ICD-10-CM

## 2024-05-21 DIAGNOSIS — K21.9 GASTROESOPHAGEAL REFLUX DISEASE, UNSPECIFIED WHETHER ESOPHAGITIS PRESENT: ICD-10-CM

## 2024-05-21 DIAGNOSIS — E66.8 HYPERPLASTIC-HYPERTROPHIC OBESITY: ICD-10-CM

## 2024-05-21 DIAGNOSIS — E55.9 AVITAMINOSIS D: ICD-10-CM

## 2024-05-21 DIAGNOSIS — E11.9 DIABETES MELLITUS WITHOUT COMPLICATION: ICD-10-CM

## 2024-05-21 DIAGNOSIS — N18.4 CHRONIC KIDNEY DISEASE, STAGE IV (SEVERE): Primary | ICD-10-CM

## 2024-05-21 LAB
ALBUMIN SERPL-MCNC: 3.7 G/DL (ref 3.4–4.8)
ALBUMIN/GLOB SERPL: 1 RATIO (ref 1.1–2)
ALP SERPL-CCNC: 87 UNIT/L (ref 40–150)
ALT SERPL-CCNC: 12 UNIT/L (ref 0–55)
ANION GAP SERPL CALC-SCNC: 7 MEQ/L
AST SERPL-CCNC: 13 UNIT/L (ref 5–34)
BASOPHILS # BLD AUTO: 0.02 X10(3)/MCL
BASOPHILS NFR BLD AUTO: 0.2 %
BILIRUB SERPL-MCNC: 0.3 MG/DL
BUN SERPL-MCNC: 22.7 MG/DL (ref 9.8–20.1)
CALCIUM SERPL-MCNC: 10.5 MG/DL (ref 8.4–10.2)
CHLORIDE SERPL-SCNC: 109 MMOL/L (ref 98–107)
CO2 SERPL-SCNC: 24 MMOL/L (ref 23–31)
CREAT SERPL-MCNC: 2.07 MG/DL (ref 0.55–1.02)
CREAT/UREA NIT SERPL: 11
EOSINOPHIL # BLD AUTO: 0.35 X10(3)/MCL (ref 0–0.9)
EOSINOPHIL NFR BLD AUTO: 4 %
ERYTHROCYTE [DISTWIDTH] IN BLOOD BY AUTOMATED COUNT: 14.3 % (ref 11.5–17)
GFR SERPLBLD CREATININE-BSD FMLA CKD-EPI: 25 ML/MIN/1.73/M2
GLOBULIN SER-MCNC: 3.8 GM/DL (ref 2.4–3.5)
GLUCOSE SERPL-MCNC: 135 MG/DL (ref 82–115)
HCT VFR BLD AUTO: 37.2 % (ref 37–47)
HGB BLD-MCNC: 11.3 G/DL (ref 12–16)
IMM GRANULOCYTES # BLD AUTO: 0.01 X10(3)/MCL (ref 0–0.04)
IMM GRANULOCYTES NFR BLD AUTO: 0.1 %
LYMPHOCYTES # BLD AUTO: 2.03 X10(3)/MCL (ref 0.6–4.6)
LYMPHOCYTES NFR BLD AUTO: 23.1 %
MAGNESIUM SERPL-MCNC: 1.9 MG/DL (ref 1.6–2.6)
MCH RBC QN AUTO: 26 PG (ref 27–31)
MCHC RBC AUTO-ENTMCNC: 30.4 G/DL (ref 33–36)
MCV RBC AUTO: 85.5 FL (ref 80–94)
MONOCYTES # BLD AUTO: 0.57 X10(3)/MCL (ref 0.1–1.3)
MONOCYTES NFR BLD AUTO: 6.5 %
NEUTROPHILS # BLD AUTO: 5.81 X10(3)/MCL (ref 2.1–9.2)
NEUTROPHILS NFR BLD AUTO: 66.1 %
PHOSPHATE SERPL-MCNC: 3.4 MG/DL (ref 2.3–4.7)
PLATELET # BLD AUTO: 284 X10(3)/MCL (ref 130–400)
PMV BLD AUTO: 12 FL (ref 7.4–10.4)
POTASSIUM SERPL-SCNC: 4.3 MMOL/L (ref 3.5–5.1)
PROT SERPL-MCNC: 7.5 GM/DL (ref 5.8–7.6)
PTH-INTACT SERPL-MCNC: 227.9 PG/ML (ref 8.7–77)
RBC # BLD AUTO: 4.35 X10(6)/MCL (ref 4.2–5.4)
SODIUM SERPL-SCNC: 140 MMOL/L (ref 136–145)
WBC # SPEC AUTO: 8.79 X10(3)/MCL (ref 4.5–11.5)

## 2024-05-21 PROCEDURE — 83970 ASSAY OF PARATHORMONE: CPT

## 2024-05-21 PROCEDURE — 84100 ASSAY OF PHOSPHORUS: CPT

## 2024-05-21 PROCEDURE — 80053 COMPREHEN METABOLIC PANEL: CPT

## 2024-05-21 PROCEDURE — 36415 COLL VENOUS BLD VENIPUNCTURE: CPT

## 2024-05-21 PROCEDURE — 85025 COMPLETE CBC W/AUTO DIFF WBC: CPT

## 2024-05-21 PROCEDURE — 83735 ASSAY OF MAGNESIUM: CPT

## 2024-05-28 ENCOUNTER — HOSPITAL ENCOUNTER (OUTPATIENT)
Dept: RADIOLOGY | Facility: HOSPITAL | Age: 69
Discharge: HOME | End: 2024-05-28
Payer: MEDICARE

## 2024-05-28 ENCOUNTER — OFFICE VISIT (OUTPATIENT)
Dept: FAMILY MEDICINE | Facility: CLINIC | Age: 69
End: 2024-05-28
Payer: MEDICARE

## 2024-05-28 VITALS
OXYGEN SATURATION: 97 % | RESPIRATION RATE: 17 BRPM | DIASTOLIC BLOOD PRESSURE: 69 MMHG | HEIGHT: 68 IN | WEIGHT: 265 LBS | SYSTOLIC BLOOD PRESSURE: 107 MMHG | HEART RATE: 83 BPM | TEMPERATURE: 98 F | BODY MASS INDEX: 40.16 KG/M2

## 2024-05-28 DIAGNOSIS — M25.561 CHRONIC PAIN OF RIGHT KNEE: Primary | ICD-10-CM

## 2024-05-28 DIAGNOSIS — M25.561 CHRONIC PAIN OF RIGHT KNEE: ICD-10-CM

## 2024-05-28 DIAGNOSIS — G89.29 CHRONIC PAIN OF RIGHT KNEE: Primary | ICD-10-CM

## 2024-05-28 DIAGNOSIS — G89.29 CHRONIC PAIN OF RIGHT KNEE: ICD-10-CM

## 2024-05-28 PROCEDURE — 73564 X-RAY EXAM KNEE 4 OR MORE: CPT | Mod: TC,RT

## 2024-05-28 PROCEDURE — 99215 OFFICE O/P EST HI 40 MIN: CPT | Mod: PBBFAC

## 2024-05-28 RX ORDER — HYDRALAZINE HYDROCHLORIDE 100 MG/1
100 TABLET, FILM COATED ORAL 3 TIMES DAILY
COMMUNITY
Start: 2024-05-04

## 2024-05-29 ENCOUNTER — OFFICE VISIT (OUTPATIENT)
Dept: FAMILY MEDICINE | Facility: CLINIC | Age: 69
End: 2024-05-29
Payer: MEDICARE

## 2024-05-29 VITALS
OXYGEN SATURATION: 98 % | RESPIRATION RATE: 20 BRPM | WEIGHT: 266 LBS | TEMPERATURE: 98 F | DIASTOLIC BLOOD PRESSURE: 81 MMHG | HEART RATE: 78 BPM | SYSTOLIC BLOOD PRESSURE: 128 MMHG | BODY MASS INDEX: 40.32 KG/M2 | HEIGHT: 68 IN

## 2024-05-29 DIAGNOSIS — M17.11 PRIMARY OSTEOARTHRITIS OF RIGHT KNEE: Primary | ICD-10-CM

## 2024-05-29 DIAGNOSIS — M54.50 CHRONIC LOW BACK PAIN, UNSPECIFIED BACK PAIN LATERALITY, UNSPECIFIED WHETHER SCIATICA PRESENT: ICD-10-CM

## 2024-05-29 DIAGNOSIS — G89.29 CHRONIC LOW BACK PAIN, UNSPECIFIED BACK PAIN LATERALITY, UNSPECIFIED WHETHER SCIATICA PRESENT: ICD-10-CM

## 2024-05-29 PROCEDURE — 99213 OFFICE O/P EST LOW 20 MIN: CPT | Mod: PBBFAC

## 2024-05-29 PROCEDURE — 20610 DRAIN/INJ JOINT/BURSA W/O US: CPT | Mod: PBBFAC

## 2024-05-29 RX ORDER — DICLOFENAC SODIUM 10 MG/G
GEL TOPICAL
Qty: 200 EACH | Refills: 2 | Status: SHIPPED | OUTPATIENT
Start: 2024-05-29

## 2024-05-29 RX ORDER — TRIAMCINOLONE ACETONIDE 40 MG/ML
40 INJECTION, SUSPENSION INTRA-ARTICULAR; INTRAMUSCULAR
Status: COMPLETED | OUTPATIENT
Start: 2024-05-29 | End: 2024-05-29

## 2024-05-29 RX ORDER — LIDOCAINE HYDROCHLORIDE 10 MG/ML
1 INJECTION, SOLUTION EPIDURAL; INFILTRATION; INTRACAUDAL; PERINEURAL
Status: DISCONTINUED | OUTPATIENT
Start: 2024-05-29 | End: 2024-05-29

## 2024-05-29 RX ORDER — LIDOCAINE HYDROCHLORIDE 10 MG/ML
4 INJECTION, SOLUTION EPIDURAL; INFILTRATION; INTRACAUDAL; PERINEURAL
Status: COMPLETED | OUTPATIENT
Start: 2024-05-29 | End: 2024-05-29

## 2024-05-29 RX ADMIN — LIDOCAINE HYDROCHLORIDE 40 MG: 10 INJECTION, SOLUTION EPIDURAL; INFILTRATION; INTRACAUDAL; PERINEURAL at 01:05

## 2024-05-29 RX ADMIN — TRIAMCINOLONE ACETONIDE 40 MG: 40 INJECTION, SUSPENSION INTRA-ARTICULAR; INTRAMUSCULAR at 01:05

## 2024-05-29 NOTE — PROGRESS NOTES
"Subjective:    Patient ID: Ivania Hatch is a 68 y.o. female  who presented to [facility] for right knee pain.      Chief Complaint: Follow-up (Pt is following up in office for right knee pain)      History of Present Illness:    Ivania Hatch who has a history of bilateral  presented today with with knee pain involving the right knee for the past 1 week. Pain is located medial. Quality of pain is described as Aching and Throbbing.  Inciting event: this is a longstanding problem which has been getting worse. Pain is aggravated by any weight bearing.  Patient has had prior knee problems. Evaluation to date: plain films, PCP evaluation, and Ortho evaluation. Treatment to date: bracing, topical analgesics, oral analgesics, and ice/heat. Expectations for today's visit includes CS injection.  Occupation includes retired private sitter.    Knee Review of Systems:  Swelling?  yes  Instability?  no  Mechanical sx?  no  <30 min AM stiffness? yes  Limited ROM? yes  Fever/Chills? no    Current Choice of Exercise:  Walking and Cycling    ROS   Comorbid conditions: type 2 Dm: last A1c was 7.5 in 2/24 and CKD -avoid NSAIDS       Objective:      Physical Exam:    /81 (BP Location: Left arm, Patient Position: Sitting, BP Method: Large (Automatic))   Pulse 78   Temp 98.1 °F (36.7 °C) (Oral)   Resp 20   Ht 5' 8" (1.727 m)   Wt 120.7 kg (266 lb)   SpO2 98%   BMI 40.45 kg/m²     Ortho/SPM Exam    Appearance:  antalgic  FWB  Alignment: Left: mild varus Right: mild varus   Soft tissue swelling: Left: no Right: no  Effusion: Left:  Negative Right: Negative  Erythema: Left no Right: no  Ecchymosis: Left: no Right: no  Atrophy: Left: no Right: no    Palpation:  Knee Tenderness: Left: None Right: Medial joint line and Lateral joint line    Range of motion:  Flexion (140): Left:  Normal Right: 110  Extension (0): Left: 0 Right: 6    Strength:  Extension: Left 5/5  Pain: no     Right 5/5 Pain: yes  Flexion: Left 5/5 Pain: no Right   " 5/5 Pain: yes        Special Tests:  Ballotable Effusion:Left: Negative Right: Negative   Fluid Wave: Left: Negative Right: Negative   Crepitus: Left: Positive Right: Positive   Patellar grind test: Left: Negative  Right: Positive  Apprehension test: Left: Not performed Right: Not performed   Varus: @ 0, Left Negative Right: Negative.  @ 30, Left Negative  Right Negative   Valgus: @ 0, Left Negative Right: Negative.  @ 30, Left Negative  Right Negative  Lachman: Left: Negative Right: Negative   Ant Drawer: Left: Negative Right: Negative   Posterior Drawer: Left: Negative Right: Negative   Dial Test: Left: Not performed Right: Not performed   Bala: Left: Negative Right: Negative   Apley's: Left: Positive Right: Positive   Thessaly's: Left: Positive Right: Positive   Noble Compression: Left: Not performed Right: Not performed   Katheryn: Left: Not performed Right: Not performed       General appearance: NAD  Peripheral pulses: normal bilaterally   Reflexes: Left: normal Right normal   Sensation: normal    Labs:  Last A1c: 7.5     Imaging:   Previous images reviewed.  X-rays ordered and performed today: no  # of views: 4 Laterality: right  My Interpretation:  shows DJD changes, likely chronic     Assessment:        Encounter Diagnoses   Code Name Primary?    M54.50, G89.29 Chronic low back pain, unspecified back pain laterality, unspecified whether sciatica present         Plan:           No orders of the defined types were placed in this encounter.    Medications Ordered This Encounter   Medications    diclofenac sodium (VOLTAREN) 1 % Gel     Sig: See Instructions, APPLY 1 THIN LAYER TOPICALLY 4 TIMES DAILY AS NEEDED FOR PAIN /  NOT  TO  EXCEED  16GRAMS/DAY  SINGLE  JOINT  OF  LOWER  EXTREMITIES, # 300 gm, 1 Refill(s), Pharmacy: [facility], 175, cm, Height/Length Dosing, 08/23/21 10:4...     Dispense:  200 each     Refill:  2       MDM:     Dx: right Knee Osteoarthritis.    Treatment Plan: Discussed with  patient diagnosis, prognosis, and treatment recommendations. Education provided.    Home physical therapy exercise handouts provided to patient.   topical hot or cold therapy  Over the counter NSAID and/or tylenol provided you do not have contraindications such as but not limited to liver or kidney disease or uncontrolled blood pressure. If you're doctors have told you to to not take them based on your health, do not take them.   Voltaren gel topical sent to patient's pharmacy  Imaging: radiological studies ordered and independently reviewed; discussed with patient; pending radiologist interpretation.   Weight Management: is paramount. recommend at least 10% of total body weight loss if your bmi is 30-34.9. A bmi 24.9 or less may provide further relief..   Procedure: Discussed CSI/VSI as treatment options; discussed CSI vs VS injections as treatment options; since conservative measures did not improve symptoms patient consented for CSI today.  Activity: Activity as tolerated; HEP to include aerobic conditioning and strength training with non-painful activity. ROM/STG exercises. Proper footware; assistive devises to avoid limping.   Therapy: Physical Therapy discussed  Medication: CONTINUE Voltaren Gel 1% as prescribed. Please see your primary care physician for further refills.    Follow up on June 24,2024 for regular healthcare maintainence.       Large Joint Aspiration/Injection: R knee    Date/Time: 5/29/2024 1:00 PM    Performed by: Juancho Ng DO  Authorized by: Juancho Ng, DO    Consent Done?:  Yes (Written)  Indications:  Arthritis and pain  Site marked: the procedure site was marked    Timeout: prior to procedure the correct patient, procedure, and site was verified      Details:  Needle Size:  21 G  Ultrasonic Guidance for needle placement?: No    Approach:  Anterolateral  Location:  Knee  Site:  R knee  Patient tolerance:  Patient tolerated the procedure well with no immediate complications      Staff: Kamila Costa MD     Risks:  Possible complications with the injection include bleeding, infection (.01%), tendon rupture, steroid flare, fat pad or soft tissue atrophy, skin depigmentation, allergic reaction to medications and vasovagal response. (steroid flare treatment is rest, ice, NSAIDs and resolves in 24-36 hours.)    Consent:  No absolute contraindications (cellulitis overlying joint, infection, lack of informed consent, allergy to injection medication, AVN protein or egg allergy for sodium hyaluronate, or history of steroid flare) or relative contraindications (uncontrolled DM2 A1c>10, coagulopathy, INR > 3.5, previous joint replacement or history of AVN).        Description:  The patient was prepped in normal sterile fashion use of chlorhexidine scrub and the appropriate and anatomic landmarks were identified without ultrasound.  Contents of syringe included: 4cc of 1% of lidocaine with 40mg of Kenalog     Post Procedure: Patient alert, and moving all extremities. ROM improved, pain decreased.  Good peripheral pulses, no signs of vascular compromise and range of motion intact.  Aftercare instructions were given to patient at time of discharge.  Relative rest for 3 days-avoiding excess activity.  Place ice on the area for 15 minutes every 4-6 hours. Patient may take Tylenol a 1000 mg b.i.d. or ibuprofen 600 mg t.i.d. for the next 3-4 days if not on medication already and safe to take pending co-morbidities.  Protect the area for the next 1-8 hours if anesthetic was used.  Avoid excessive activity for the next 3-4 weeks.  ER precautions given for fever, severe joint pain or allergic reaction or other new symptoms related to the joint injection.            Juancho Ng DO  Family Medicine Resident, -

## 2024-05-30 DIAGNOSIS — I11.9 HYPERTENSIVE HEART DISEASE WITHOUT CONGESTIVE HEART FAILURE: ICD-10-CM

## 2024-05-30 DIAGNOSIS — D64.9 NORMOCYTIC ANEMIA: ICD-10-CM

## 2024-05-30 RX ORDER — FERROUS SULFATE 325(65) MG
TABLET ORAL
Qty: 90 TABLET | Refills: 1 | Status: SHIPPED | OUTPATIENT
Start: 2024-05-30

## 2024-05-30 RX ORDER — ASPIRIN 81 MG/1
81 TABLET ORAL DAILY
Qty: 90 TABLET | Refills: 1 | Status: SHIPPED | OUTPATIENT
Start: 2024-05-30

## 2024-05-30 NOTE — PROGRESS NOTES
Faculty Attestation: Ivania Hatch  was seen in Family Medicine Clinic. Patient seen and evaluated at the time of the visit. History of Present Illness, Physical Exam, and Assessment and Plan reviewed. Treatment plan is reasonable and appropriate. Compliance with treatment recommendations is important.  Radiology images independently reviewed and agree with radiologist interpretation. Procedure note reviewed. Present for entire procedure with the resident. Patient tolerated procedure well.      Kamila Costa MD  Family Medicine

## 2024-06-11 DIAGNOSIS — M25.561 RIGHT KNEE PAIN: Primary | ICD-10-CM

## 2024-06-17 NOTE — PROGRESS NOTES
I have seen the patient, reviewed the Resident's history and physical. I have personally interviewed and examined the patient at bedside and: agree with the findings.       Gen: overweight elderly BF  Skin: vitiligo  MSK: positive crepitus R knee

## 2024-06-20 DIAGNOSIS — Z79.4 TYPE 2 DIABETES MELLITUS WITHOUT COMPLICATION, WITH LONG-TERM CURRENT USE OF INSULIN: ICD-10-CM

## 2024-06-20 DIAGNOSIS — E11.9 TYPE 2 DIABETES MELLITUS WITHOUT COMPLICATION, WITH LONG-TERM CURRENT USE OF INSULIN: ICD-10-CM

## 2024-06-20 RX ORDER — DULAGLUTIDE 3 MG/.5ML
3 INJECTION, SOLUTION SUBCUTANEOUS
Qty: 13 PEN | Refills: 0 | Status: SHIPPED | OUTPATIENT
Start: 2024-06-20

## 2024-06-24 ENCOUNTER — OFFICE VISIT (OUTPATIENT)
Dept: FAMILY MEDICINE | Facility: CLINIC | Age: 69
End: 2024-06-24
Payer: MEDICARE

## 2024-06-24 VITALS
DIASTOLIC BLOOD PRESSURE: 74 MMHG | WEIGHT: 266 LBS | BODY MASS INDEX: 40.32 KG/M2 | HEART RATE: 72 BPM | TEMPERATURE: 98 F | SYSTOLIC BLOOD PRESSURE: 132 MMHG | OXYGEN SATURATION: 98 % | HEIGHT: 68 IN

## 2024-06-24 DIAGNOSIS — E11.9 TYPE 2 DIABETES MELLITUS WITHOUT COMPLICATION, WITH LONG-TERM CURRENT USE OF INSULIN: ICD-10-CM

## 2024-06-24 DIAGNOSIS — K21.9 GASTROESOPHAGEAL REFLUX DISEASE, UNSPECIFIED WHETHER ESOPHAGITIS PRESENT: ICD-10-CM

## 2024-06-24 DIAGNOSIS — R87.610 ASCUS OF CERVIX WITH NEGATIVE HIGH RISK HPV: Primary | ICD-10-CM

## 2024-06-24 DIAGNOSIS — I11.9 HYPERTENSIVE HEART DISEASE WITHOUT CONGESTIVE HEART FAILURE: ICD-10-CM

## 2024-06-24 DIAGNOSIS — Z79.4 TYPE 2 DIABETES MELLITUS WITHOUT COMPLICATION, WITH LONG-TERM CURRENT USE OF INSULIN: ICD-10-CM

## 2024-06-24 LAB
CHOLEST SERPL-MCNC: 144 MG/DL
CHOLEST/HDLC SERPL: 5 {RATIO} (ref 0–5)
EST. AVERAGE GLUCOSE BLD GHB EST-MCNC: 162.8 MG/DL
HBA1C MFR BLD: 7.3 %
HDLC SERPL-MCNC: 28 MG/DL (ref 35–60)
LDLC SERPL CALC-MCNC: 66 MG/DL (ref 50–140)
TRIGL SERPL-MCNC: 249 MG/DL (ref 37–140)
VLDLC SERPL CALC-MCNC: 50 MG/DL

## 2024-06-24 PROCEDURE — 99213 OFFICE O/P EST LOW 20 MIN: CPT | Mod: PBBFAC

## 2024-06-24 PROCEDURE — 36415 COLL VENOUS BLD VENIPUNCTURE: CPT

## 2024-06-24 PROCEDURE — 87624 HPV HI-RISK TYP POOLED RSLT: CPT

## 2024-06-24 PROCEDURE — 83036 HEMOGLOBIN GLYCOSYLATED A1C: CPT

## 2024-06-24 PROCEDURE — 88174 CYTOPATH C/V AUTO IN FLUID: CPT

## 2024-06-24 PROCEDURE — 80061 LIPID PANEL: CPT

## 2024-06-24 RX ORDER — PEN NEEDLE, DIABETIC 30 GX3/16"
NEEDLE, DISPOSABLE MISCELLANEOUS
Qty: 90 EACH | Refills: 3 | Status: SHIPPED | OUTPATIENT
Start: 2024-06-24

## 2024-06-24 RX ORDER — EMPAGLIFLOZIN 25 MG/1
25 TABLET, FILM COATED ORAL EVERY MORNING
Qty: 90 TABLET | Refills: 1 | Status: SHIPPED | OUTPATIENT
Start: 2024-06-24

## 2024-06-24 RX ORDER — ROSUVASTATIN CALCIUM 20 MG/1
20 TABLET, COATED ORAL DAILY
Qty: 90 TABLET | Refills: 1 | Status: SHIPPED | OUTPATIENT
Start: 2024-06-24

## 2024-06-24 RX ORDER — PANTOPRAZOLE SODIUM 40 MG/1
40 TABLET, DELAYED RELEASE ORAL DAILY
Qty: 90 TABLET | Refills: 1 | Status: SHIPPED | OUTPATIENT
Start: 2024-06-24

## 2024-06-24 RX ORDER — FUROSEMIDE 80 MG/1
80 TABLET ORAL EVERY OTHER DAY
COMMUNITY
Start: 2024-05-29

## 2024-06-24 RX ORDER — INSULIN GLARGINE 100 [IU]/ML
INJECTION, SOLUTION SUBCUTANEOUS
Qty: 15 ML | Refills: 5 | Status: SHIPPED | OUTPATIENT
Start: 2024-06-24

## 2024-06-24 NOTE — PROGRESS NOTES
[place] [place]  Ivania Hatch  [id number]  06/24/2024    Chief Complaint   Patient presents with    Follow-up    Diabetes       Ivania Hatch is a 68 y.o. female  presenting to [facility] for Diabetes f/u.    History of present Illness    Diabetes: last A1c of 7.5, complaint with diet and medications. Does not check sugars at home. Denies medication side effects.    Abnormal pap: ascus on 6/6023 pap smear hpv neg. Postmenopausal, not on hormone therapy.    Health Maintenance:  Cervical CA: Pap 6/2023 ASCUS HPV neg. Repeat due.   Breast CA: 10/25/23 Right Breast: Negative (BI-RADS 1) Left Breast: Negative (BI-RADS 1)  Colon CA: has FH colon cancer (father). Colonoscopy 11/2023, polypectomy x1 with Dr. Knott, rec repeat in 5 years  DEXA: 9/2021: osteopenia. No FH of hip fractures. Repeat ordered due 9/24  Lung CA: quit 30 years ago. About 5 pack years of smoking.     Review of Systems   Respiratory:  Negative for shortness of breath.    Cardiovascular:  Negative for chest pain.   Gastrointestinal:  Negative for nausea and vomiting.       Vitals:    06/24/24 1349   BP: 132/74   Pulse: 72   Temp: 98.1 °F (36.7 °C)   TempSrc: Oral   SpO2: 98%   Weight: 120.7 kg (266 lb)   Height: 5' 8" (1.727 m)        Physical Exam  Vitals reviewed. Exam conducted with a chaperone present.   Constitutional:       Appearance: Normal appearance.   HENT:      Head: Normocephalic and atraumatic.   Eyes:      Extraocular Movements: Extraocular movements intact.      Conjunctiva/sclera: Conjunctivae normal.   Cardiovascular:      Rate and Rhythm: Normal rate and regular rhythm.      Heart sounds: Normal heart sounds.   Pulmonary:      Breath sounds: Normal breath sounds.   Abdominal:      General: There is no distension.      Palpations: Abdomen is soft.      Tenderness: There is no abdominal tenderness.   Genitourinary:     General: Normal vulva.      Vagina: No vaginal discharge.      Cervix: No cervical motion tenderness, discharge, " friability, lesion or cervical bleeding.   Skin:     General: Skin is warm and dry.      Comments: Areas of hypopigmentation diffusely over body, previous sites of SJS   Neurological:      General: No focal deficit present.      Mental Status: She is alert.   Psychiatric:         Mood and Affect: Mood normal.       Procedure Note: pap smear  Procedure: Pap smear  Performed by: Juancho Ng DO  Supervised by: Eric Coelho MD  Description: Chaperone present, patient supine and feet placed in stirrups, cervix visualized with speculum. A brush was used in a clockwise motions and a second brush was turned once clockwise within cervical opening. Speculum removed and patient instructed to push back and slowly raise to sitting position.  The patient tolerated the procedure well with no complications.      Current Medications:   Current Outpatient Medications   Medication Sig Dispense Refill    furosemide (LASIX) 80 MG tablet Take 80 mg by mouth every other day.      amLODIPine (NORVASC) 10 MG tablet Take 10 mg by mouth once daily.      aspirin (ECOTRIN) 81 MG EC tablet Take 1 tablet (81 mg total) by mouth once daily. 90 tablet 1    blood sugar diagnostic Strp 1 each by Misc.(Non-Drug; Combo Route) route 3 (three) times daily. 200 each 2    carvediloL (COREG) 25 MG tablet Take 25 mg by mouth 2 (two) times daily.      cetirizine (ZYRTEC) 10 MG tablet Take 10 mg by mouth every evening.      chlorthalidone (HYGROTEN) 25 MG Tab Take 25 mg by mouth.      cholecalciferol, vitamin D3, 125 mcg (5,000 unit) capsule Take 5,000 Units by mouth.      clotrimazole (LOTRIMIN) 1 % cream Apply topically 2 (two) times daily. 24 g 2    diclofenac sodium (VOLTAREN) 1 % Gel See Instructions, APPLY 1 THIN LAYER TOPICALLY 4 TIMES DAILY AS NEEDED FOR PAIN /  NOT  TO  EXCEED  16GRAMS/DAY  SINGLE  JOINT  OF  LOWER  EXTREMITIES, # 300 gm, 1 Refill(s), Pharmacy: [facility], 175, cm, Height/Length Dosing, 08/23/21 10:4... 200 each 2     "docusate sodium 100 mg capsule Stool Softener 100 mg tablet   Take 1 tablet every day by oral route.      dulaglutide (TRULICITY) 3 mg/0.5 mL pen injector Inject 3 mg into the skin every 7 days. 13 pen 0    ferrous sulfate (FEOSOL) 325 mg (65 mg iron) Tab tablet See Instructions, Take 1 tablet by mouth once daily for 90 days, # 90 tab(s), 3 Refill(s), Pharmacy: [facility], 175, cm, Height/Length Dosing, 21 10:41:00 CDT, 126, kg, Weight Dosing, 21 10:41:00 CDT 90 tablet 1    fluticasone propionate (FLONASE) 50 mcg/actuation nasal spray 1 spray (50 mcg total) by Each Nostril route daily as needed for Rhinitis. 48 mL 1    gabapentin (NEURONTIN) 300 MG capsule Take 1 capsule (300 mg total) by mouth 2 (two) times daily. 180 capsule 1    garlic 1,000 mg Cap Take 1,000 mg by mouth.      hydrALAZINE (APRESOLINE) 100 MG tablet Take 100 mg by mouth 3 (three) times daily.      isosorbide mononitrate (IMDUR) 60 MG 24 hr tablet Take 60 mg by mouth.      JARDIANCE 25 mg tablet Take 1 tablet (25 mg total) by mouth every morning. 90 tablet 1    LANTUS SOLOSTAR U-100 INSULIN 100 unit/mL (3 mL) InPn pen SMARTSI Unit(s) SUB-Q Every Evening 15 mL 5    LUMIGAN 0.01 % Drop Place 1 drop into both eyes every evening.      MICROLET LANCET Misc USE 1 LANCET TO CHECK GLUCOSE THREE TIMES DAILY      MIRALAX 17 gram/dose powder Take 17 g by mouth.      olmesartan (BENICAR) 40 MG tablet Take 40 mg by mouth.      pantoprazole (PROTONIX) 40 MG tablet Take 1 tablet (40 mg total) by mouth once daily. 90 tablet 1    pen needle, diabetic 32 gauge x 5/32" Ndle USE AS DIRECTED ONCE DAILY 90 each 3    predniSONE (DELTASONE) 20 MG tablet Take 1 tablet (20 mg total) by mouth once daily when having acute gout flare 6 tablet 0    rosuvastatin (CRESTOR) 20 MG tablet Take 1 tablet (20 mg total) by mouth once daily. 90 tablet 1    spironolactone (ALDACTONE) 25 MG tablet Take 25 mg by mouth.      timolol maleate 0.5% (TIMOPTIC) 0.5 % " "Drop Place 1 drop into both eyes 2 (two) times daily.       No current facility-administered medications for this visit.       Assessment:   1. Type 2 diabetes mellitus without complication, with long-term current use of insulin  Refilled insulin  Hemoglobin A1C- of 7.3, continue current medication and diet  Lipid Panel- LDL less than 90, at goal continue current medication, HDL decreased and triglycerides elevated.    2. Gastroesophageal reflux disease, unspecified whether esophagitis present  Controlled with current medication. Refills sent    4. ASCUS of cervix with negative high risk HPV  Pap smear performed today  Will refer to gyn for colpo if path returns abnormal       Orders Placed This Encounter    Hemoglobin A1C    Lipid Panel    Liquid-Based Pap Smear, Screening    JARDIANCE 25 mg tablet    LANTUS SOLOSTAR U-100 INSULIN 100 unit/mL (3 mL) InPn pen    pantoprazole (PROTONIX) 40 MG tablet    rosuvastatin (CRESTOR) 20 MG tablet    pen needle, diabetic 32 gauge x 5/32" Ndle       Follow up in about 3 months (around 9/24/2024) for diabetes f/u.     Juancho Ng DO  [employer]  Resident, HO-1     "

## 2024-06-27 LAB
HIGH RISK HPV: NEGATIVE
PSYCHE PATHOLOGY RESULT: NORMAL

## 2024-07-03 ENCOUNTER — TELEPHONE (OUTPATIENT)
Dept: FAMILY MEDICINE | Facility: CLINIC | Age: 69
End: 2024-07-03
Payer: MEDICARE

## 2024-07-03 NOTE — TELEPHONE ENCOUNTER
Patient identity confirmed. Discussed lab results LDL at goal, triglycerides elevated, but not fasting at the time of lab draw. Will monitor for now, but have discussed with patient the need to add medication if elevation continues. Repeat in 3-6 months. Pap smear NIL hpv negative. All questions answered and Ms. Hatch expressed understanding.

## 2024-07-22 ENCOUNTER — OFFICE VISIT (OUTPATIENT)
Dept: ORTHOPEDICS | Facility: CLINIC | Age: 69
End: 2024-07-22
Payer: MEDICARE

## 2024-07-22 ENCOUNTER — HOSPITAL ENCOUNTER (OUTPATIENT)
Dept: RADIOLOGY | Facility: HOSPITAL | Age: 69
Discharge: HOME | End: 2024-07-22
Attending: STUDENT IN AN ORGANIZED HEALTH CARE EDUCATION/TRAINING PROGRAM
Payer: MEDICARE

## 2024-07-22 VITALS
WEIGHT: 270.5 LBS | HEIGHT: 68 IN | SYSTOLIC BLOOD PRESSURE: 146 MMHG | TEMPERATURE: 98 F | BODY MASS INDEX: 41 KG/M2 | HEART RATE: 85 BPM | DIASTOLIC BLOOD PRESSURE: 78 MMHG

## 2024-07-22 DIAGNOSIS — M17.0 BILATERAL PRIMARY OSTEOARTHRITIS OF KNEE: ICD-10-CM

## 2024-07-22 DIAGNOSIS — M25.561 RIGHT KNEE PAIN: ICD-10-CM

## 2024-07-22 DIAGNOSIS — M25.562 LEFT KNEE PAIN, UNSPECIFIED CHRONICITY: ICD-10-CM

## 2024-07-22 DIAGNOSIS — M25.562 LEFT KNEE PAIN, UNSPECIFIED CHRONICITY: Primary | ICD-10-CM

## 2024-07-22 PROCEDURE — 73564 X-RAY EXAM KNEE 4 OR MORE: CPT | Mod: TC,RT

## 2024-07-22 PROCEDURE — 99215 OFFICE O/P EST HI 40 MIN: CPT | Mod: PBBFAC,25

## 2024-07-22 PROCEDURE — 73564 X-RAY EXAM KNEE 4 OR MORE: CPT | Mod: TC,LT

## 2024-07-22 RX ORDER — ACETAMINOPHEN 325 MG/1
650 TABLET ORAL EVERY 8 HOURS PRN
Qty: 120 TABLET | Refills: 1 | Status: SHIPPED | OUTPATIENT
Start: 2024-07-22 | End: 2024-08-31

## 2024-07-24 ENCOUNTER — LAB VISIT (OUTPATIENT)
Dept: LAB | Facility: HOSPITAL | Age: 69
End: 2024-07-24
Attending: INTERNAL MEDICINE
Payer: MEDICARE

## 2024-07-24 DIAGNOSIS — E11.9 DIABETES MELLITUS: Primary | ICD-10-CM

## 2024-07-24 DIAGNOSIS — E11.9 DIABETES MELLITUS WITHOUT COMPLICATION: ICD-10-CM

## 2024-07-24 DIAGNOSIS — K21.9 GASTROESOPHAGEAL REFLUX DISEASE, UNSPECIFIED WHETHER ESOPHAGITIS PRESENT: ICD-10-CM

## 2024-07-24 DIAGNOSIS — E55.9 VITAMIN D DEFICIENCY DISEASE: ICD-10-CM

## 2024-07-24 DIAGNOSIS — D64.9 ANEMIA, UNSPECIFIED TYPE: ICD-10-CM

## 2024-07-24 DIAGNOSIS — E66.8 HYPERPLASTIC-HYPERTROPHIC OBESITY: ICD-10-CM

## 2024-07-24 DIAGNOSIS — M10.00 GOUTY NEURITIS: ICD-10-CM

## 2024-07-24 DIAGNOSIS — G63 GOUTY NEURITIS: ICD-10-CM

## 2024-07-24 LAB
ALBUMIN SERPL-MCNC: 3.7 G/DL (ref 3.4–4.8)
ALBUMIN/GLOB SERPL: 1.2 RATIO (ref 1.1–2)
ALP SERPL-CCNC: 90 UNIT/L (ref 40–150)
ALT SERPL-CCNC: 12 UNIT/L (ref 0–55)
ANION GAP SERPL CALC-SCNC: 8 MEQ/L
AST SERPL-CCNC: 13 UNIT/L (ref 5–34)
BASOPHILS # BLD AUTO: 0.01 X10(3)/MCL
BASOPHILS NFR BLD AUTO: 0.1 %
BILIRUB SERPL-MCNC: 0.3 MG/DL
BUN SERPL-MCNC: 23.7 MG/DL (ref 9.8–20.1)
CALCIUM SERPL-MCNC: 9.7 MG/DL (ref 8.4–10.2)
CHLORIDE SERPL-SCNC: 111 MMOL/L (ref 98–107)
CO2 SERPL-SCNC: 23 MMOL/L (ref 23–31)
CREAT SERPL-MCNC: 1.85 MG/DL (ref 0.55–1.02)
CREAT/UREA NIT SERPL: 13
EOSINOPHIL # BLD AUTO: 0.3 X10(3)/MCL (ref 0–0.9)
EOSINOPHIL NFR BLD AUTO: 3.6 %
ERYTHROCYTE [DISTWIDTH] IN BLOOD BY AUTOMATED COUNT: 14.6 % (ref 11.5–17)
GFR SERPLBLD CREATININE-BSD FMLA CKD-EPI: 29 ML/MIN/1.73/M2
GLOBULIN SER-MCNC: 3.1 GM/DL (ref 2.4–3.5)
GLUCOSE SERPL-MCNC: 206 MG/DL (ref 82–115)
HCT VFR BLD AUTO: 36.5 % (ref 37–47)
HGB BLD-MCNC: 11.2 G/DL (ref 12–16)
IMM GRANULOCYTES # BLD AUTO: 0.01 X10(3)/MCL (ref 0–0.04)
IMM GRANULOCYTES NFR BLD AUTO: 0.1 %
LYMPHOCYTES # BLD AUTO: 1.89 X10(3)/MCL (ref 0.6–4.6)
LYMPHOCYTES NFR BLD AUTO: 22.9 %
MAGNESIUM SERPL-MCNC: 2 MG/DL (ref 1.6–2.6)
MCH RBC QN AUTO: 26 PG (ref 27–31)
MCHC RBC AUTO-ENTMCNC: 30.7 G/DL (ref 33–36)
MCV RBC AUTO: 84.9 FL (ref 80–94)
MONOCYTES # BLD AUTO: 0.58 X10(3)/MCL (ref 0.1–1.3)
MONOCYTES NFR BLD AUTO: 7 %
NEUTROPHILS # BLD AUTO: 5.48 X10(3)/MCL (ref 2.1–9.2)
NEUTROPHILS NFR BLD AUTO: 66.3 %
PHOSPHATE SERPL-MCNC: 3.4 MG/DL (ref 2.3–4.7)
PLATELET # BLD AUTO: 239 X10(3)/MCL (ref 130–400)
PMV BLD AUTO: 11.7 FL (ref 7.4–10.4)
POTASSIUM SERPL-SCNC: 4.9 MMOL/L (ref 3.5–5.1)
PROT SERPL-MCNC: 6.8 GM/DL (ref 5.8–7.6)
RBC # BLD AUTO: 4.3 X10(6)/MCL (ref 4.2–5.4)
SODIUM SERPL-SCNC: 142 MMOL/L (ref 136–145)
WBC # BLD AUTO: 8.27 X10(3)/MCL (ref 4.5–11.5)

## 2024-07-24 PROCEDURE — 84100 ASSAY OF PHOSPHORUS: CPT

## 2024-07-24 PROCEDURE — 36415 COLL VENOUS BLD VENIPUNCTURE: CPT

## 2024-07-24 PROCEDURE — 80053 COMPREHEN METABOLIC PANEL: CPT

## 2024-07-24 PROCEDURE — 83735 ASSAY OF MAGNESIUM: CPT

## 2024-07-24 PROCEDURE — 83970 ASSAY OF PARATHORMONE: CPT

## 2024-07-24 PROCEDURE — 85025 COMPLETE CBC W/AUTO DIFF WBC: CPT

## 2024-07-25 LAB — PTH-INTACT SERPL-MCNC: 258.8 PG/ML (ref 8.7–77)

## 2024-08-23 DIAGNOSIS — E11.9 TYPE 2 DIABETES MELLITUS WITHOUT COMPLICATION, WITH LONG-TERM CURRENT USE OF INSULIN: ICD-10-CM

## 2024-08-23 DIAGNOSIS — Z79.4 TYPE 2 DIABETES MELLITUS WITHOUT COMPLICATION, WITH LONG-TERM CURRENT USE OF INSULIN: ICD-10-CM

## 2024-08-23 RX ORDER — DULAGLUTIDE 3 MG/.5ML
3 INJECTION, SOLUTION SUBCUTANEOUS
Qty: 13 PEN | Refills: 0 | Status: SHIPPED | OUTPATIENT
Start: 2024-08-23

## 2024-09-16 DIAGNOSIS — E11.9 TYPE 2 DIABETES MELLITUS WITHOUT COMPLICATION, WITH LONG-TERM CURRENT USE OF INSULIN: ICD-10-CM

## 2024-09-16 DIAGNOSIS — Z79.4 TYPE 2 DIABETES MELLITUS WITHOUT COMPLICATION, WITH LONG-TERM CURRENT USE OF INSULIN: ICD-10-CM

## 2024-09-16 RX ORDER — DULAGLUTIDE 3 MG/.5ML
3 INJECTION, SOLUTION SUBCUTANEOUS
Qty: 13 PEN | Refills: 0 | Status: SHIPPED | OUTPATIENT
Start: 2024-09-16

## 2024-09-24 ENCOUNTER — OFFICE VISIT (OUTPATIENT)
Dept: FAMILY MEDICINE | Facility: CLINIC | Age: 69
End: 2024-09-24
Payer: MEDICARE

## 2024-09-24 VITALS
TEMPERATURE: 98 F | SYSTOLIC BLOOD PRESSURE: 122 MMHG | HEIGHT: 68 IN | WEIGHT: 264.38 LBS | BODY MASS INDEX: 40.07 KG/M2 | HEART RATE: 76 BPM | DIASTOLIC BLOOD PRESSURE: 72 MMHG | OXYGEN SATURATION: 100 %

## 2024-09-24 DIAGNOSIS — E78.2 MIXED HYPERLIPIDEMIA: ICD-10-CM

## 2024-09-24 DIAGNOSIS — Z79.4 TYPE 2 DIABETES MELLITUS WITHOUT COMPLICATION, WITH LONG-TERM CURRENT USE OF INSULIN: Primary | ICD-10-CM

## 2024-09-24 DIAGNOSIS — N18.32 STAGE 3B CHRONIC KIDNEY DISEASE: ICD-10-CM

## 2024-09-24 DIAGNOSIS — Z13.820 ENCOUNTER FOR OSTEOPOROSIS SCREENING IN ASYMPTOMATIC POSTMENOPAUSAL PATIENT: ICD-10-CM

## 2024-09-24 DIAGNOSIS — Z12.31 ENCOUNTER FOR SCREENING MAMMOGRAM FOR MALIGNANT NEOPLASM OF BREAST: ICD-10-CM

## 2024-09-24 DIAGNOSIS — Z78.0 ENCOUNTER FOR OSTEOPOROSIS SCREENING IN ASYMPTOMATIC POSTMENOPAUSAL PATIENT: ICD-10-CM

## 2024-09-24 DIAGNOSIS — E11.9 TYPE 2 DIABETES MELLITUS WITHOUT COMPLICATION, WITH LONG-TERM CURRENT USE OF INSULIN: Primary | ICD-10-CM

## 2024-09-24 PROBLEM — N18.4 STAGE 4 CHRONIC KIDNEY DISEASE: Status: ACTIVE | Noted: 2024-07-31

## 2024-09-24 PROCEDURE — 99215 OFFICE O/P EST HI 40 MIN: CPT | Mod: PBBFAC

## 2024-09-24 RX ORDER — TIRZEPATIDE 5 MG/.5ML
5 INJECTION, SOLUTION SUBCUTANEOUS
Qty: 3 ML | Refills: 1 | Status: SHIPPED | OUTPATIENT
Start: 2024-09-24

## 2024-09-24 RX ORDER — SPIRONOLACTONE 25 MG/1
25 TABLET ORAL
COMMUNITY
Start: 2024-07-31

## 2024-09-24 NOTE — PROGRESS NOTES
"[facility] OFFICE VISIT NOTE  Ivania Hatch  [id number]  09/25/2024    Chief Complaint   Patient presents with    Follow-up     DM2       Ivania Hatch is a 68 y.o. female  presenting to [facility] for diabetes.    History of present Illness    DM:   did not bring blood sugar log, last A1c 7.3.   Having issues obtaining trulicity at current dose.   Denies nausea, vomiting, diarrhea, constipation, polyuria, polydipsia, urinary frequency, dysuria, or urinary urgency.  Diabetic Eye exams with [facility] eye clinic    CKD  Hyperparathyroidism  follows with Dr. Lopes.    HTN  At goal today  Does record at home and states it may be getting too low. Values of 110 systolic, most running 120s/70s  Follow with [facility]       Health Maintenance:  Cervical CA: Pap 6/2023 ASCUS HPV neg. Repeat on 6/2024 Nil and HRHPV negative  Breast CA: 10/25/23 Right Breast: Negative (BI-RADS 1) Left Breast: Negative (BI-RADS 1)  Colon CA: has FH colon cancer (father). Colonoscopy 11/2023, polypectomy x1 with Dr. Knott, rec repeat in 5 years  DEXA: 9/2021: osteopenia. No FH of hip fractures. Repeat ordered Due  Lung CA: quit 30 years ago. About 5 pack years of smoking.    Review of Systems  As per HPI    Vitals:    09/24/24 1514   BP: 122/72   Pulse: 76   Temp: 98 °F (36.7 °C)   TempSrc: Oral   SpO2: 100%   Weight: 119.9 kg (264 lb 6.4 oz)   Height: 5' 8" (1.727 m)        Physical Exam  Vitals reviewed.   Constitutional:       Appearance: Normal appearance.   HENT:      Head: Normocephalic and atraumatic.   Eyes:      Extraocular Movements: Extraocular movements intact.      Conjunctiva/sclera: Conjunctivae normal.   Cardiovascular:      Rate and Rhythm: Normal rate and regular rhythm.      Heart sounds: Normal heart sounds.   Pulmonary:      Breath sounds: Normal breath sounds.   Abdominal:      General: There is no distension.      Palpations: There is no mass.      Tenderness: There is no abdominal tenderness.   Musculoskeletal:      Cervical back: No " tenderness.   Lymphadenopathy:      Cervical: No cervical adenopathy.   Skin:     General: Skin is warm and dry.      Comments: Scattered hypopigmentation to skin   Neurological:      General: No focal deficit present.      Mental Status: She is alert.   Psychiatric:         Mood and Affect: Mood normal.          Current Medications:   Current Outpatient Medications   Medication Instructions    amLODIPine (NORVASC) 10 mg, Oral, Daily    aspirin (ECOTRIN) 81 mg, Oral, Daily    blood sugar diagnostic Strp 1 each, Misc.(Non-Drug; Combo Route), 3 times daily    carvediloL (COREG) 25 mg, Oral, 2 times daily    cetirizine (ZYRTEC) 10 mg, Oral, Nightly    chlorthalidone (HYGROTEN) 25 mg, Oral    cholecalciferol (vitamin D3) 5,000 Units, Oral    clotrimazole (LOTRIMIN) 1 % cream Topical (Top), 2 times daily    diclofenac sodium (VOLTAREN) 1 % Gel <BR>See Instructions, APPLY 1 THIN LAYER TOPICALLY 4 TIMES DAILY AS NEEDED FOR PAIN /  NOT  TO  EXCEED  16GRAMS/DAY  SINGLE  JOINT  OF  LOWER  EXTREMITIES, # 300 gm, 1 Refill(s), Pharmacy: [facility], 175, cm, Height/Length Dosing, 21 10:4...    docusate sodium 100 mg capsule Stool Softener 100 mg tablet   Take 1 tablet every day by oral route.    ferrous sulfate (FEOSOL) 325 mg (65 mg iron) Tab tablet <BR>See Instructions, Take 1 tablet by mouth once daily for 90 days, # 90 tab(s), 3 Refill(s), Pharmacy: [facility], 175, cm, Height/Length Dosing, 21 10:41:00 CDT, 126, kg, Weight Dosing, 21 10:41:00 CDT    fluticasone propionate (FLONASE) 50 mcg, Each Nostril, Daily PRN    gabapentin (NEURONTIN) 300 mg, Oral, 2 times daily    garlic 1,000 mg, Oral    hydrALAZINE (APRESOLINE) 100 mg, Oral, 3 times daily    isosorbide mononitrate (IMDUR) 60 mg, Oral    JARDIANCE 25 mg, Oral, Every morning    LANTUS SOLOSTAR U-100 INSULIN 100 unit/mL (3 mL) InPn pen SMARTSI Unit(s) SUB-Q Every Evening    LUMIGAN 0.01 % Drop 1 drop, Both Eyes, Nightly     "MICROLET LANCET Misc USE 1 LANCET TO CHECK GLUCOSE THREE TIMES DAILY    MIRALAX 17 g, Oral    MOUNJARO 5 mg, Subcutaneous, Every 7 days    olmesartan (BENICAR) 40 mg, Oral    pantoprazole (PROTONIX) 40 mg, Oral, Daily    pen needle, diabetic 32 gauge x 5/32" Ndle USE AS DIRECTED ONCE DAILY    rosuvastatin (CRESTOR) 20 mg, Oral, Daily    spironolactone (ALDACTONE) 25 mg, Oral    timolol maleate 0.5% (TIMOPTIC) 0.5 % Drop 1 drop, Both Eyes, 2 times daily           Assessment:   1. Type 2 diabetes mellitus without complication, with long-term current use of insulin  Switching trulicity to mounjaro 5mg   Hemoglobin A1C ordered  Continue lifestyle management    2. Stage 3b chronic kidney disease  Stable, next appointment with Dr. Lopes on 10/15.  CMP ordered    3. Mixed hyperlipidemia  Stable, last lipid panel not fasting  Fasting Lipid Panel ordered    4. Encounter for screening mammogram for malignant neoplasm of breast  Mammo Digital Screening Bilat w/ Marcellus ordered    5. Encounter for osteoporosis screening in asymptomatic postmenopausal patient  DXA Bone Density Axial Skeleton 1 or more site ordered       Orders Placed This Encounter    Mammo Digital Screening Bilat w/ Marcellus    DXA Bone Density Axial Skeleton 1 or more sites    Hemoglobin A1C    Comprehensive Metabolic Panel    Lipid Panel    tirzepatide (MOUNJARO) 5 mg/0.5 mL PnIj       Follow up in about 4 weeks (around 10/22/2024) for diabetes.     Juancho Ng DO  U  Resident, [facility]   "

## 2024-10-03 NOTE — PROGRESS NOTES
"  Subjective:    Patient ID: Ivania Hatch is a 68 y.o. female  who presented to [facility] for follow up.    Chief Complaint: Pain of the Right Knee and Pain of the Left Knee      History of Present Illness:  Ivania Hatch with a PMHx of bilateral knee osteoarthritis presents to the clinic with acute on chronic knee pain (s/p right CSI 5/29/24 at PCP) today with for follow up on a knee problem involving both knees for the past 6 month. Pain is located medial knee bilaterally. Quality of pain is described as Aching. She is unable to take NSAIDs due to her kidney function per her PCP and has been taking tylenol with little relief. She has been compliant with home exercise program, she had attended PT but she was unable to continue financially. Inciting event: none known. Pain is aggravated by any weight bearing, going up and down stairs, rising after sitting, and walking.  Patient has had no prior knee problems. Evaluation to date: plain films and PT evaluation. Treatment to date: avoidance of activity, CSI, home exercises, and ice/heat. Expectations for today's visit includes discussion next steps.  Occupation: retired, CNA previously. PCP is Dr. Ng.      Knee Review of Systems:  Swelling?  yes  Instability?  no  Mechanical sx?  yes  <30 min AM stiffness? yes  Limited ROM? yes  Fever/Chills? no       Objective:      Physical Exam:    /69   Pulse 73   Ht 5' 8" (1.727 m)   Wt 119 kg (262 lb 5.6 oz)   BMI 39.89 kg/m²     Appearance:  antalgic  FWB  Alignment: Left: mild valgus Right: mild valgus   Soft tissue swelling: Left: no Right: no  Effusion: Left:  Negative Right: Negative  Erythema: Left no Right: no  Ecchymosis: Left: no Right: no  Atrophy: Left: no Right: no    Palpation:  Knee Tenderness: Left: Medial joint line Right: Medial joint line and Lateral joint line    Range of motion:  Flexion (140): Left:  60 Right: 40  Extension (0): Left: 0 Right: " 0    Strength:  Extension: Left 5/5  Pain: yes     Right 5/5 Pain: yes  Flexion: Left 5/5 Pain: yes Right   5/5 Pain: yes        Special Tests:  Ballotable Effusion:Left: Negative Right: Negative   Fluid Wave: Left: Negative Right: Negative   Crepitus: Left: Positive Right: Positive   Patellar grind test: Left: Positive  Right: Positive  Apprehension test: Left: Negative Right: Negative   Varus: @ 0, Left Negative Right: Positive.  @ 30, Left Negative  Right Positive   Valgus: @ 0, Left Positive Right: Positive.  @ 30, Left Positive  Right Positive  Lachman: Left: Negative Right: Negative   Ant Drawer: Left: Negative Right: Negative   Posterior Drawer: Left: Negative Right: Negative   Dial Test: Left: Not performed Right: Not performed   Bala: Left: Positive Right: Positive   Apley's: Left: Not performed Right: Not performed  Thessaly's: Left: Not performed Right: Not performed   Noble Compression: Left: Not performed Right: Not performed   Katheryn: Left: Not performed Right: Not performed       General appearance: NAD  Peripheral pulses: normal bilaterally   Reflexes: Left: normal Right normal   Sensation: normal    Labs:  Last A1c: 6.9     Imaging:   Previous images reviewed.  X-rays ordered and performed today: no  My Interpretation of 7/22/24:  Bilateral medial joint narrowing with patellofemoral joint articular space degeneration, KI grade 3      Assessment:        Encounter Diagnoses   Code Name Primary?    M17.0 Bilateral primary osteoarthritis of knee Yes        Plan:      Dx: bilateral Knee Osteoarthritis.    Treatment Plan: Discussed with patient diagnosis and treatment recommendations. Education provided. Handout given. Recommend conservative treatment to include: avoidance of aggravating activity, significant modification of daily activities, hot/cold therapies, topical and oral medications, braces, HEP/PT/OT, and injections. Patient would not like a CSI today and would like to attempt VSI PA. Will  submit PA for VSI bilaterally.   Imaging: prior radiological studies independently reviewed; discussed with patient; agree with radiologist interpretation.   Weight Management: is paramount. Recommend to discuss with PCP about medication and bariatric surgery options for weight loss if your BMI is >35 and applicable. A BMI of <24.9 may provide further relief.  Procedure: Discussed injections as treatment options; discussed injections as treatment options; since conservative measures did not improve symptoms patient consent for VS; pending PA approval.  Activity: Activity as tolerated; HEP to include aerobic conditioning and strength training with non-painful activity. ROM/STG exercises. Proper footware; assistive devises to avoid limping.   Therapy: HEP  Medication: START Voltaren Gel 1% as prescribed to affected area  CONTINUE over-the-counter acetaminophen (Tylenol 1000 mg three times per day as needed). Please see your primary care physician for further refills.  RTC: schedule after PA obtained; schedule for once a week for 3 weeks and then 6 months for follow-up appointment.           This note is dictated using the M*Modal Fluency Direct word recognition program. There are word recognition mistakes that are occasionally missed on review.     Susan Morejon MD  Sports Medicine Fellow

## 2024-10-04 ENCOUNTER — LAB VISIT (OUTPATIENT)
Dept: LAB | Facility: HOSPITAL | Age: 69
End: 2024-10-04
Payer: MEDICARE

## 2024-10-04 ENCOUNTER — OFFICE VISIT (OUTPATIENT)
Dept: ORTHOPEDICS | Facility: CLINIC | Age: 69
End: 2024-10-04
Payer: MEDICARE

## 2024-10-04 VITALS
HEIGHT: 68 IN | SYSTOLIC BLOOD PRESSURE: 123 MMHG | BODY MASS INDEX: 39.76 KG/M2 | HEART RATE: 73 BPM | WEIGHT: 262.38 LBS | DIASTOLIC BLOOD PRESSURE: 69 MMHG

## 2024-10-04 DIAGNOSIS — E11.9 TYPE 2 DIABETES MELLITUS WITHOUT COMPLICATION, WITH LONG-TERM CURRENT USE OF INSULIN: ICD-10-CM

## 2024-10-04 DIAGNOSIS — N18.32 STAGE 3B CHRONIC KIDNEY DISEASE: ICD-10-CM

## 2024-10-04 DIAGNOSIS — Z79.4 TYPE 2 DIABETES MELLITUS WITHOUT COMPLICATION, WITH LONG-TERM CURRENT USE OF INSULIN: ICD-10-CM

## 2024-10-04 DIAGNOSIS — E78.2 MIXED HYPERLIPIDEMIA: ICD-10-CM

## 2024-10-04 DIAGNOSIS — M17.0 BILATERAL PRIMARY OSTEOARTHRITIS OF KNEE: Primary | ICD-10-CM

## 2024-10-04 LAB
ALBUMIN SERPL-MCNC: 3.5 G/DL (ref 3.4–4.8)
ALBUMIN/GLOB SERPL: 1.1 RATIO (ref 1.1–2)
ALP SERPL-CCNC: 76 UNIT/L (ref 40–150)
ALT SERPL-CCNC: 8 UNIT/L (ref 0–55)
ANION GAP SERPL CALC-SCNC: 8 MEQ/L
AST SERPL-CCNC: 13 UNIT/L (ref 5–34)
BILIRUB SERPL-MCNC: 0.3 MG/DL
BUN SERPL-MCNC: 28.2 MG/DL (ref 9.8–20.1)
CALCIUM SERPL-MCNC: 10.2 MG/DL (ref 8.4–10.2)
CHLORIDE SERPL-SCNC: 113 MMOL/L (ref 98–107)
CHOLEST SERPL-MCNC: 122 MG/DL
CHOLEST/HDLC SERPL: 4 {RATIO} (ref 0–5)
CO2 SERPL-SCNC: 23 MMOL/L (ref 23–31)
CREAT SERPL-MCNC: 1.79 MG/DL (ref 0.55–1.02)
CREAT/UREA NIT SERPL: 16
EST. AVERAGE GLUCOSE BLD GHB EST-MCNC: 151.3 MG/DL
GFR SERPLBLD CREATININE-BSD FMLA CKD-EPI: 30 ML/MIN/1.73/M2
GLOBULIN SER-MCNC: 3.1 GM/DL (ref 2.4–3.5)
GLUCOSE SERPL-MCNC: 124 MG/DL (ref 82–115)
HBA1C MFR BLD: 6.9 %
HDLC SERPL-MCNC: 32 MG/DL (ref 35–60)
LDLC SERPL CALC-MCNC: 65 MG/DL (ref 50–140)
POTASSIUM SERPL-SCNC: 4.5 MMOL/L (ref 3.5–5.1)
PROT SERPL-MCNC: 6.6 GM/DL (ref 5.8–7.6)
SODIUM SERPL-SCNC: 144 MMOL/L (ref 136–145)
TRIGL SERPL-MCNC: 127 MG/DL (ref 37–140)
VLDLC SERPL CALC-MCNC: 25 MG/DL

## 2024-10-04 PROCEDURE — 36415 COLL VENOUS BLD VENIPUNCTURE: CPT

## 2024-10-04 PROCEDURE — 83036 HEMOGLOBIN GLYCOSYLATED A1C: CPT

## 2024-10-04 PROCEDURE — 80053 COMPREHEN METABOLIC PANEL: CPT

## 2024-10-04 PROCEDURE — 80061 LIPID PANEL: CPT

## 2024-10-04 PROCEDURE — 99213 OFFICE O/P EST LOW 20 MIN: CPT | Mod: PBBFAC

## 2024-10-04 RX ORDER — DICLOFENAC SODIUM 10 MG/G
2 GEL TOPICAL 4 TIMES DAILY
Qty: 100 G | Refills: 3 | Status: SHIPPED | OUTPATIENT
Start: 2024-10-04

## 2024-10-15 ENCOUNTER — LAB VISIT (OUTPATIENT)
Dept: LAB | Facility: HOSPITAL | Age: 69
End: 2024-10-15
Attending: INTERNAL MEDICINE
Payer: MEDICARE

## 2024-10-15 DIAGNOSIS — I12.9 PARENCHYMAL RENAL HYPERTENSION: ICD-10-CM

## 2024-10-15 DIAGNOSIS — R31.29 MICROSCOPIC HEMATURIA: ICD-10-CM

## 2024-10-15 DIAGNOSIS — E66.811 OBESITY, CLASS I, BMI 30-34.9: ICD-10-CM

## 2024-10-15 DIAGNOSIS — N18.4 CHRONIC KIDNEY DISEASE, STAGE IV (SEVERE): Primary | ICD-10-CM

## 2024-10-15 DIAGNOSIS — E55.9 AVITAMINOSIS D: ICD-10-CM

## 2024-10-15 LAB
25(OH)D3+25(OH)D2 SERPL-MCNC: 46 NG/ML (ref 30–80)
ALBUMIN SERPL-MCNC: 3.8 G/DL (ref 3.4–4.8)
ALBUMIN/GLOB SERPL: 1.3 RATIO (ref 1.1–2)
ALP SERPL-CCNC: 82 UNIT/L (ref 40–150)
ALT SERPL-CCNC: 16 UNIT/L (ref 0–55)
ANION GAP SERPL CALC-SCNC: 9 MEQ/L
AST SERPL-CCNC: 11 UNIT/L (ref 5–34)
BASOPHILS # BLD AUTO: 0.02 X10(3)/MCL
BASOPHILS NFR BLD AUTO: 0.2 %
BILIRUB SERPL-MCNC: 0.2 MG/DL
BUN SERPL-MCNC: 42.4 MG/DL (ref 9.8–20.1)
CALCIUM SERPL-MCNC: 9.9 MG/DL (ref 8.4–10.2)
CHLORIDE SERPL-SCNC: 113 MMOL/L (ref 98–107)
CO2 SERPL-SCNC: 20 MMOL/L (ref 23–31)
CREAT SERPL-MCNC: 2.18 MG/DL (ref 0.55–1.02)
CREAT/UREA NIT SERPL: 19
EOSINOPHIL # BLD AUTO: 0.26 X10(3)/MCL (ref 0–0.9)
EOSINOPHIL NFR BLD AUTO: 2.8 %
ERYTHROCYTE [DISTWIDTH] IN BLOOD BY AUTOMATED COUNT: 14.4 % (ref 11.5–17)
GFR SERPLBLD CREATININE-BSD FMLA CKD-EPI: 24 ML/MIN/1.73/M2
GLOBULIN SER-MCNC: 2.9 GM/DL (ref 2.4–3.5)
GLUCOSE SERPL-MCNC: 208 MG/DL (ref 82–115)
HCT VFR BLD AUTO: 36.9 % (ref 37–47)
HGB BLD-MCNC: 11.2 G/DL (ref 12–16)
IMM GRANULOCYTES # BLD AUTO: 0.03 X10(3)/MCL (ref 0–0.04)
IMM GRANULOCYTES NFR BLD AUTO: 0.3 %
LYMPHOCYTES # BLD AUTO: 2.04 X10(3)/MCL (ref 0.6–4.6)
LYMPHOCYTES NFR BLD AUTO: 21.8 %
MAGNESIUM SERPL-MCNC: 2.2 MG/DL (ref 1.6–2.6)
MCH RBC QN AUTO: 25.6 PG (ref 27–31)
MCHC RBC AUTO-ENTMCNC: 30.4 G/DL (ref 33–36)
MCV RBC AUTO: 84.2 FL (ref 80–94)
MONOCYTES # BLD AUTO: 0.56 X10(3)/MCL (ref 0.1–1.3)
MONOCYTES NFR BLD AUTO: 6 %
NEUTROPHILS # BLD AUTO: 6.45 X10(3)/MCL (ref 2.1–9.2)
NEUTROPHILS NFR BLD AUTO: 68.9 %
PHOSPHATE SERPL-MCNC: 3.2 MG/DL (ref 2.3–4.7)
PLATELET # BLD AUTO: 285 X10(3)/MCL (ref 130–400)
PMV BLD AUTO: 11.3 FL (ref 7.4–10.4)
POTASSIUM SERPL-SCNC: 4.5 MMOL/L (ref 3.5–5.1)
PROT SERPL-MCNC: 6.7 GM/DL (ref 5.8–7.6)
PTH-INTACT SERPL-MCNC: 276.7 PG/ML (ref 8.7–77)
RBC # BLD AUTO: 4.38 X10(6)/MCL (ref 4.2–5.4)
SODIUM SERPL-SCNC: 142 MMOL/L (ref 136–145)
WBC # BLD AUTO: 9.36 X10(3)/MCL (ref 4.5–11.5)

## 2024-10-15 PROCEDURE — 82306 VITAMIN D 25 HYDROXY: CPT

## 2024-10-15 PROCEDURE — 36415 COLL VENOUS BLD VENIPUNCTURE: CPT

## 2024-10-15 PROCEDURE — 85025 COMPLETE CBC W/AUTO DIFF WBC: CPT

## 2024-10-15 PROCEDURE — 80053 COMPREHEN METABOLIC PANEL: CPT

## 2024-10-15 PROCEDURE — 84100 ASSAY OF PHOSPHORUS: CPT

## 2024-10-15 PROCEDURE — 83735 ASSAY OF MAGNESIUM: CPT

## 2024-10-15 PROCEDURE — 83970 ASSAY OF PARATHORMONE: CPT

## 2024-10-18 ENCOUNTER — TELEPHONE (OUTPATIENT)
Dept: ORTHOPEDICS | Facility: CLINIC | Age: 69
End: 2024-10-18

## 2024-10-18 NOTE — TELEPHONE ENCOUNTER
----- Message from Susan Morejon sent at 10/4/2024  9:06 AM CDT -----  Regarding: Visco PA  Please obtain prior authorization for viscosupplementation.  After obtaining, schedule a patient for procedure only appointment as appropriate with NP  (Euflexxa/Orthovisc/Hyalgan/Synvisc 1 visit per week for 3 weeks or Durolane 1 visit)     Laterality: bilateral  Estimated Return to Clinic: after PA approval

## 2024-11-04 DIAGNOSIS — J30.2 SEASONAL ALLERGIES: ICD-10-CM

## 2024-11-05 RX ORDER — FLUTICASONE PROPIONATE 50 MCG
1 SPRAY, SUSPENSION (ML) NASAL DAILY PRN
Qty: 48 ML | Refills: 1 | Status: SHIPPED | OUTPATIENT
Start: 2024-11-05

## 2024-11-18 DIAGNOSIS — I11.9 HYPERTENSIVE HEART DISEASE WITHOUT CONGESTIVE HEART FAILURE: ICD-10-CM

## 2024-11-18 RX ORDER — ASPIRIN 81 MG/1
81 TABLET ORAL DAILY
Qty: 90 TABLET | Refills: 1 | Status: SHIPPED | OUTPATIENT
Start: 2024-11-18

## 2024-11-21 NOTE — TELEPHONE ENCOUNTER
Please schedule patient for ETHAN EuflexxaPer Euflexxa with NP.  Per Euflexxa Benefits Team: Patient has met their yearly deductible and would be covered at 80% of medicare Part B, You are clear to schedule patient. Thanks.

## 2024-12-30 ENCOUNTER — LAB VISIT (OUTPATIENT)
Dept: LAB | Facility: HOSPITAL | Age: 69
End: 2024-12-30
Attending: INTERNAL MEDICINE
Payer: MEDICARE

## 2024-12-30 DIAGNOSIS — E66.811 OBESITY, CLASS I, BMI 30-34.9: ICD-10-CM

## 2024-12-30 DIAGNOSIS — D64.9 ANEMIA, UNSPECIFIED TYPE: ICD-10-CM

## 2024-12-30 DIAGNOSIS — E55.9 AVITAMINOSIS D: ICD-10-CM

## 2024-12-30 DIAGNOSIS — R31.29 MICROSCOPIC HEMATURIA: ICD-10-CM

## 2024-12-30 DIAGNOSIS — I12.9 PARENCHYMAL RENAL HYPERTENSION: ICD-10-CM

## 2024-12-30 DIAGNOSIS — N18.4 CHRONIC KIDNEY DISEASE, STAGE IV (SEVERE): Primary | ICD-10-CM

## 2024-12-30 LAB
25(OH)D3+25(OH)D2 SERPL-MCNC: 51 NG/ML (ref 30–80)
ALBUMIN SERPL-MCNC: 3.7 G/DL (ref 3.4–4.8)
ALBUMIN/GLOB SERPL: 1.1 RATIO (ref 1.1–2)
ALP SERPL-CCNC: 90 UNIT/L (ref 40–150)
ALT SERPL-CCNC: 13 UNIT/L (ref 0–55)
ANION GAP SERPL CALC-SCNC: 8 MEQ/L
AST SERPL-CCNC: 13 UNIT/L (ref 5–34)
BASOPHILS # BLD AUTO: 0.03 X10(3)/MCL
BASOPHILS NFR BLD AUTO: 0.4 %
BILIRUB SERPL-MCNC: 0.3 MG/DL
BUN SERPL-MCNC: 38.2 MG/DL (ref 9.8–20.1)
CALCIUM SERPL-MCNC: 9.8 MG/DL (ref 8.4–10.2)
CHLORIDE SERPL-SCNC: 109 MMOL/L (ref 98–107)
CO2 SERPL-SCNC: 24 MMOL/L (ref 23–31)
CREAT SERPL-MCNC: 1.84 MG/DL (ref 0.55–1.02)
CREAT/UREA NIT SERPL: 21
EOSINOPHIL # BLD AUTO: 0.28 X10(3)/MCL (ref 0–0.9)
EOSINOPHIL NFR BLD AUTO: 3.9 %
ERYTHROCYTE [DISTWIDTH] IN BLOOD BY AUTOMATED COUNT: 14.8 % (ref 11.5–17)
GFR SERPLBLD CREATININE-BSD FMLA CKD-EPI: 29 ML/MIN/1.73/M2
GLOBULIN SER-MCNC: 3.5 GM/DL (ref 2.4–3.5)
GLUCOSE SERPL-MCNC: 142 MG/DL (ref 82–115)
HCT VFR BLD AUTO: 38.8 % (ref 37–47)
HGB BLD-MCNC: 11.9 G/DL (ref 12–16)
IMM GRANULOCYTES # BLD AUTO: 0.01 X10(3)/MCL (ref 0–0.04)
IMM GRANULOCYTES NFR BLD AUTO: 0.1 %
LYMPHOCYTES # BLD AUTO: 1.65 X10(3)/MCL (ref 0.6–4.6)
LYMPHOCYTES NFR BLD AUTO: 23.2 %
MAGNESIUM SERPL-MCNC: 2.1 MG/DL (ref 1.6–2.6)
MCH RBC QN AUTO: 26.2 PG (ref 27–31)
MCHC RBC AUTO-ENTMCNC: 30.7 G/DL (ref 33–36)
MCV RBC AUTO: 85.5 FL (ref 80–94)
MONOCYTES # BLD AUTO: 0.51 X10(3)/MCL (ref 0.1–1.3)
MONOCYTES NFR BLD AUTO: 7.2 %
NEUTROPHILS # BLD AUTO: 4.63 X10(3)/MCL (ref 2.1–9.2)
NEUTROPHILS NFR BLD AUTO: 65.2 %
PHOSPHATE SERPL-MCNC: 3.4 MG/DL (ref 2.3–4.7)
PLATELET # BLD AUTO: 237 X10(3)/MCL (ref 130–400)
PMV BLD AUTO: 12 FL (ref 7.4–10.4)
POTASSIUM SERPL-SCNC: 5.3 MMOL/L (ref 3.5–5.1)
PROT SERPL-MCNC: 7.2 GM/DL (ref 5.8–7.6)
PTH-INTACT SERPL-MCNC: 316.5 PG/ML (ref 8.7–77)
RBC # BLD AUTO: 4.54 X10(6)/MCL (ref 4.2–5.4)
SODIUM SERPL-SCNC: 141 MMOL/L (ref 136–145)
WBC # BLD AUTO: 7.11 X10(3)/MCL (ref 4.5–11.5)

## 2024-12-30 PROCEDURE — 83970 ASSAY OF PARATHORMONE: CPT

## 2024-12-30 PROCEDURE — 84100 ASSAY OF PHOSPHORUS: CPT

## 2024-12-30 PROCEDURE — 36415 COLL VENOUS BLD VENIPUNCTURE: CPT

## 2024-12-30 PROCEDURE — 82306 VITAMIN D 25 HYDROXY: CPT

## 2024-12-30 PROCEDURE — 83735 ASSAY OF MAGNESIUM: CPT

## 2024-12-30 PROCEDURE — 85025 COMPLETE CBC W/AUTO DIFF WBC: CPT

## 2024-12-30 PROCEDURE — 80053 COMPREHEN METABOLIC PANEL: CPT

## 2025-01-02 ENCOUNTER — OFFICE VISIT (OUTPATIENT)
Dept: FAMILY MEDICINE | Facility: CLINIC | Age: 70
End: 2025-01-02
Payer: MEDICARE

## 2025-01-02 VITALS
WEIGHT: 260 LBS | HEIGHT: 68 IN | TEMPERATURE: 98 F | HEART RATE: 69 BPM | OXYGEN SATURATION: 100 % | SYSTOLIC BLOOD PRESSURE: 135 MMHG | BODY MASS INDEX: 39.4 KG/M2 | DIASTOLIC BLOOD PRESSURE: 79 MMHG

## 2025-01-02 DIAGNOSIS — E87.5 HYPERKALEMIA: Primary | ICD-10-CM

## 2025-01-02 DIAGNOSIS — Z78.0 ENCOUNTER FOR OSTEOPOROSIS SCREENING IN ASYMPTOMATIC POSTMENOPAUSAL PATIENT: ICD-10-CM

## 2025-01-02 DIAGNOSIS — N18.4 TYPE 2 DIABETES MELLITUS WITH STAGE 4 CHRONIC KIDNEY DISEASE, WITH LONG-TERM CURRENT USE OF INSULIN: ICD-10-CM

## 2025-01-02 DIAGNOSIS — Z13.820 ENCOUNTER FOR OSTEOPOROSIS SCREENING IN ASYMPTOMATIC POSTMENOPAUSAL PATIENT: ICD-10-CM

## 2025-01-02 DIAGNOSIS — Z79.4 TYPE 2 DIABETES MELLITUS WITH STAGE 4 CHRONIC KIDNEY DISEASE, WITH LONG-TERM CURRENT USE OF INSULIN: ICD-10-CM

## 2025-01-02 DIAGNOSIS — Z12.31 ENCOUNTER FOR SCREENING MAMMOGRAM FOR MALIGNANT NEOPLASM OF BREAST: ICD-10-CM

## 2025-01-02 DIAGNOSIS — E11.22 TYPE 2 DIABETES MELLITUS WITH STAGE 4 CHRONIC KIDNEY DISEASE, WITH LONG-TERM CURRENT USE OF INSULIN: ICD-10-CM

## 2025-01-02 LAB
ANION GAP SERPL CALC-SCNC: 9 MEQ/L
BUN SERPL-MCNC: 37.7 MG/DL (ref 9.8–20.1)
CALCIUM SERPL-MCNC: 10.1 MG/DL (ref 8.4–10.2)
CHLORIDE SERPL-SCNC: 112 MMOL/L (ref 98–107)
CO2 SERPL-SCNC: 17 MMOL/L (ref 23–31)
CREAT SERPL-MCNC: 2.05 MG/DL (ref 0.55–1.02)
CREAT UR-MCNC: 96.7 MG/DL (ref 45–106)
CREAT/UREA NIT SERPL: 18
GFR SERPLBLD CREATININE-BSD FMLA CKD-EPI: 26 ML/MIN/1.73/M2
GLUCOSE SERPL-MCNC: 163 MG/DL (ref 82–115)
MICROALBUMIN UR-MCNC: <5 UG/ML
MICROALBUMIN/CREAT RATIO PNL UR: NORMAL
POTASSIUM SERPL-SCNC: 5.4 MMOL/L (ref 3.5–5.1)
SODIUM SERPL-SCNC: 138 MMOL/L (ref 136–145)

## 2025-01-02 PROCEDURE — 80048 BASIC METABOLIC PNL TOTAL CA: CPT

## 2025-01-02 PROCEDURE — 82043 UR ALBUMIN QUANTITATIVE: CPT

## 2025-01-02 PROCEDURE — 99215 OFFICE O/P EST HI 40 MIN: CPT | Mod: PBBFAC

## 2025-01-02 PROCEDURE — 36415 COLL VENOUS BLD VENIPUNCTURE: CPT

## 2025-01-02 RX ORDER — FUROSEMIDE 80 MG/1
80 TABLET ORAL
COMMUNITY
Start: 2024-10-22 | End: 2025-10-22

## 2025-01-02 RX ORDER — PATIROMER 8.4 G/1
POWDER, FOR SUSPENSION ORAL
COMMUNITY
Start: 2024-08-06 | End: 2025-01-02 | Stop reason: ALTCHOICE

## 2025-01-02 RX ORDER — METOLAZONE 10 MG/1
10 TABLET ORAL
COMMUNITY

## 2025-01-02 RX ORDER — TIRZEPATIDE 7.5 MG/.5ML
7.5 INJECTION, SOLUTION SUBCUTANEOUS
Qty: 2 ML | Refills: 3 | Status: SHIPPED | OUTPATIENT
Start: 2025-01-02 | End: 2025-01-03 | Stop reason: SDUPTHER

## 2025-01-03 ENCOUNTER — TELEPHONE (OUTPATIENT)
Dept: FAMILY MEDICINE | Facility: CLINIC | Age: 70
End: 2025-01-03
Payer: MEDICARE

## 2025-01-03 DIAGNOSIS — K21.9 GASTROESOPHAGEAL REFLUX DISEASE, UNSPECIFIED WHETHER ESOPHAGITIS PRESENT: ICD-10-CM

## 2025-01-03 RX ORDER — TIRZEPATIDE 7.5 MG/.5ML
7.5 INJECTION, SOLUTION SUBCUTANEOUS
Qty: 2 ML | Refills: 3 | Status: SHIPPED | OUTPATIENT
Start: 2025-01-03

## 2025-01-03 RX ORDER — PANTOPRAZOLE SODIUM 40 MG/1
40 TABLET, DELAYED RELEASE ORAL DAILY
Qty: 90 TABLET | Refills: 1 | Status: SHIPPED | OUTPATIENT
Start: 2025-01-03

## 2025-01-03 RX ORDER — EMPAGLIFLOZIN 25 MG/1
25 TABLET, FILM COATED ORAL EVERY MORNING
Qty: 90 TABLET | Refills: 1 | Status: SHIPPED | OUTPATIENT
Start: 2025-01-03

## 2025-01-03 RX ORDER — TIRZEPATIDE 7.5 MG/.5ML
7.5 INJECTION, SOLUTION SUBCUTANEOUS
Qty: 2 ML | Refills: 3 | Status: CANCELLED | OUTPATIENT
Start: 2025-01-03

## 2025-01-03 NOTE — TELEPHONE ENCOUNTER
Pt insurance does not cover, Lokelma, will send Valtessa instead for 4 day course. Medications sent to [facility] pharmacy.

## 2025-01-03 NOTE — PROGRESS NOTES
Family Medicine Clinic Note     Subjective     Patient ID: Ivania Hatch is a 69 y.o. female.    Chief Complaint: Follow-up (3 month , diabetes)    HPI  Diabetes:  last A1c of 6.9, complaint with diet and medications. Does not check sugars at home. Denies medication side effects. Diabetic Eye exams with [facility] eye clinic     CKD/Hyperkalemia:  recent labs for nephrology with potassium of 5.3, not symptomatic, BUN/Cr improved from previous. States she has been eating more food rich in potassium I.e greens, tomatoes, sweet potatoes. Has appointment with nephrology next week.    PMHx:  Active Problem List with Overview Notes    Diagnosis Date Noted    Stage 4 chronic kidney disease 07/31/2024    Other disorders of intestinal carbohydrate absorption 11/27/2023    Microscopic hematuria 08/23/2023    Anemia 08/23/2023    Type 2 diabetes mellitus 09/02/2022    Low back pain 09/02/2022    Gastroesophageal reflux disease 09/02/2022    Coronary arteriosclerosis 08/05/2022    Obstructive sleep apnea of adult 08/05/2022    Hypertensive heart disease without congestive heart failure 08/05/2022    Renal artery stenosis 08/05/2022    Morbid obesity with BMI of 45.0-49.9, adult 12/31/2019    Mixed hyperlipidemia 12/31/2019    Gout 12/31/2019    Essential hypertension 12/31/2019    SJS-TEN overlap syndrome 12/31/2019       Current Outpatient Medications   Medication Instructions    amLODIPine (NORVASC) 10 mg, Oral, Daily    aspirin (ECOTRIN) 81 mg, Oral, Daily    blood sugar diagnostic Strp 1 each, Misc.(Non-Drug; Combo Route), 3 times daily    carvediloL (COREG) 25 mg, Oral, 2 times daily    cetirizine (ZYRTEC) 10 mg, Oral, Nightly    chlorthalidone (HYGROTEN) 25 mg, Oral    cholecalciferol (vitamin D3) 5,000 Units, Oral    clotrimazole (LOTRIMIN) 1 % cream Topical (Top), 2 times daily    diclofenac sodium (VOLTAREN ARTHRITIS PAIN) 2 g, Topical (Top), 4 times daily, Do not exceed 32 grams/day: do not to exceed 8 grams/day/single  "joint of upper extremities; do not to exceed 16 grams/day/single joint of lower extremities.  Please request refill of this medication from your PCP.    diclofenac sodium (VOLTAREN) 1 % Gel   See Instructions, APPLY 1 THIN LAYER TOPICALLY 4 TIMES DAILY AS NEEDED FOR PAIN /  NOT  TO  EXCEED  16GRAMS/DAY  SINGLE  JOINT  OF  LOWER  EXTREMITIES, # 300 gm, 1 Refill(s), Pharmacy: [facility], 175, cm, Height/Length Dosing, 21 10:4...    docusate sodium 100 mg capsule Stool Softener 100 mg tablet   Take 1 tablet every day by oral route.    ferrous sulfate (FEOSOL) 325 mg (65 mg iron) Tab tablet   See Instructions, Take 1 tablet by mouth once daily for 90 days, # 90 tab(s), 3 Refill(s), Pharmacy: [facility], 175, cm, Height/Length Dosing, 21 10:41:00 CDT, 126, kg, Weight Dosing, 21 10:41:00 CDT    fluticasone propionate (FLONASE) 50 mcg, Each Nostril, Daily PRN    furosemide (LASIX) 80 mg    gabapentin (NEURONTIN) 300 mg, Oral, 2 times daily    garlic 1,000 mg, Oral    hydrALAZINE (APRESOLINE) 100 mg, Oral, 3 times daily    isosorbide mononitrate (IMDUR) 60 mg, Oral    JARDIANCE 25 mg, Oral, Every morning    LANTUS SOLOSTAR U-100 INSULIN 100 unit/mL (3 mL) InPn pen SMARTSI Unit(s) SUB-Q Every Evening    LUMIGAN 0.01 % Drop 1 drop, Both Eyes, Nightly    metOLazone (ZAROXOLYN) 10 mg, Oral    MICROLET LANCET [facility] USE 1 LANCET TO CHECK GLUCOSE THREE TIMES DAILY    MIRALAX 17 g, Oral    MOUNJARO 7.5 mg, Subcutaneous, Every 7 days    olmesartan (BENICAR) 40 mg, Oral    pantoprazole (PROTONIX) 40 mg, Oral, Daily    pen needle, diabetic 32 gauge x /32" Ndle USE AS DIRECTED ONCE DAILY    rosuvastatin (CRESTOR) 20 mg, Oral, Daily    sodium zirconium cyclosilicate (LOKELMA) 10 g, Oral, Daily, Mix entire contents of packet(s) into drinking glass containing 3 tablespoons of water; stir well and drink immediately. Add water and repeat until no powder remains to receive entire dose.    " "spironolactone (ALDACTONE) 25 mg, Oral    timolol maleate 0.5% (TIMOPTIC) 0.5 % Drop 1 drop, Both Eyes, 2 times daily       Review of Systems   Constitutional:  Negative for chills and fever.   Respiratory:  Negative for shortness of breath and wheezing.    Cardiovascular:  Negative for chest pain, palpitations and leg swelling.   Gastrointestinal:  Negative for abdominal pain, nausea and vomiting.   Musculoskeletal:  Negative for myalgias.   Neurological:  Negative for dizziness, weakness and headaches.        Objective     Vitals:    01/02/25 1024   BP: 135/79   Pulse: 69   Temp: 98.4 °F (36.9 °C)   TempSrc: Oral   SpO2: 100%   Weight: 117.9 kg (260 lb)   Height: 5' 8" (1.727 m)        Physical Exam  Vitals reviewed.   Constitutional:       General: She is not in acute distress.     Appearance: Normal appearance.   HENT:      Head: Normocephalic and atraumatic.   Eyes:      Extraocular Movements: Extraocular movements intact.      Conjunctiva/sclera: Conjunctivae normal.   Cardiovascular:      Rate and Rhythm: Normal rate and regular rhythm.      Heart sounds: Normal heart sounds. No murmur heard.  Pulmonary:      Effort: Pulmonary effort is normal. No respiratory distress.      Breath sounds: Normal breath sounds. No wheezing, rhonchi or rales.   Abdominal:      General: Abdomen is flat. There is no distension.      Palpations: Abdomen is soft.      Tenderness: There is no abdominal tenderness.   Musculoskeletal:      Right lower leg: Edema (trace) present.      Left lower leg: Edema (trace) present.   Skin:     General: Skin is warm and dry.      Capillary Refill: Capillary refill takes less than 2 seconds.      Comments: Vitiligo presents to face, hands and arms    Neurological:      General: No focal deficit present.      Mental Status: She is alert.   Psychiatric:         Mood and Affect: Mood normal.          Assessment and Plan    Hyperkalemia (Primary)  Elevate on labs collected 12/3, pt states she had not " received any medication for treatment yet.  Repeat today with continued hyperkalemia at 5.4. asymptomatic  Lokelma ordered once daily for 7 days, pt previous took Veltassa in the past, but states it was very expensive   Repeat BMP in 1 week  Keep appointment with Dr. Hill for next week as well    Type 2 diabetes mellitus with stage 4 chronic kidney disease, with long-term current use of insulin  At goal range with current regimen  Increase Mounjaro to 7.5  Continue other medication as directed  Once max tolerated Mounjaro is research can begin weaning insulin as able  Microalbumin/Creatinine Ratio, Urine wnl    Encounter for breast cancer screening  Encounter for osteoporosis screening  Pt missed call for scheduling  Orders placed again for screening mammogram and dexa scan     Follow up in about 3 months (around 4/2/2025) for diabetes f/u.        Juancho Ng DO  [facility] Family Medicine Resident, [facility]

## 2025-01-03 NOTE — PROGRESS NOTES
Discussed with resident at time of encounter 01-02-25.  Pt. seen.  DM, CKD, hyperkalemia.  Followed by community nephrologist.    PE  Gen: NAD, pleasant.  HEENT: no carotid bruits.  Chest: lungs clear.  CV: reg. rhythm, no murmurs.  Ext: trace bilateral pretibal edema.    Resident's note reviewed 01-02-25.  Agree with assessment; plan of care appropriate.  Professional services provided in an outpatient primary care center affiliated with a teaching institution.

## 2025-01-04 NOTE — TELEPHONE ENCOUNTER
Pt states Vatessa is too expense. Lokelma sent . Will call Pt and notify if unable to afford medication to visit nearest ED for treatment.

## 2025-01-06 ENCOUNTER — TELEPHONE (OUTPATIENT)
Dept: FAMILY MEDICINE | Facility: CLINIC | Age: 70
End: 2025-01-06
Payer: MEDICARE

## 2025-01-06 NOTE — TELEPHONE ENCOUNTER
Patient identity confirmed. States her insurance adjusted medication tiers and she is unable to afford mounjaro now. Will need to discuss with insurance for alternative.     States both Lokelma and Valtessa are too expensive. Denies symptoms of confusion, chest pain, palpitations, shortness of breath, or muscle pain. Hyperkalemia possibly linked to ARB and spironolactone use. Instructed patient to hold spironolactone until appointment with Nephrology and repeat blood work. Instructed patient to present to ED if she experiences any of the above symptoms.    Juancho Ng DO  [facility] Family Medicine Resident, [facility]

## 2025-01-13 ENCOUNTER — TELEPHONE (OUTPATIENT)
Dept: FAMILY MEDICINE | Facility: CLINIC | Age: 70
End: 2025-01-13
Payer: MEDICARE

## 2025-01-13 RX ORDER — EMPAGLIFLOZIN 25 MG/1
25 TABLET, FILM COATED ORAL EVERY MORNING
Qty: 90 TABLET | Refills: 1 | Status: SHIPPED | OUTPATIENT
Start: 2025-01-13

## 2025-01-14 ENCOUNTER — TELEPHONE (OUTPATIENT)
Dept: FAMILY MEDICINE | Facility: CLINIC | Age: 70
End: 2025-01-14

## 2025-01-14 ENCOUNTER — OFFICE VISIT (OUTPATIENT)
Dept: FAMILY MEDICINE | Facility: CLINIC | Age: 70
End: 2025-01-14
Payer: MEDICARE

## 2025-01-14 VITALS
DIASTOLIC BLOOD PRESSURE: 71 MMHG | WEIGHT: 264 LBS | BODY MASS INDEX: 40.01 KG/M2 | HEART RATE: 78 BPM | HEIGHT: 68 IN | SYSTOLIC BLOOD PRESSURE: 155 MMHG | RESPIRATION RATE: 18 BRPM | OXYGEN SATURATION: 100 %

## 2025-01-14 DIAGNOSIS — E87.5 HYPERKALEMIA: ICD-10-CM

## 2025-01-14 DIAGNOSIS — I10 ESSENTIAL HYPERTENSION: ICD-10-CM

## 2025-01-14 DIAGNOSIS — M17.11 PRIMARY OSTEOARTHRITIS OF RIGHT KNEE: Primary | ICD-10-CM

## 2025-01-14 LAB
ANION GAP SERPL CALC-SCNC: 8 MEQ/L
BUN SERPL-MCNC: 24.7 MG/DL (ref 9.8–20.1)
CALCIUM SERPL-MCNC: 10.5 MG/DL (ref 8.4–10.2)
CHLORIDE SERPL-SCNC: 110 MMOL/L (ref 98–107)
CO2 SERPL-SCNC: 22 MMOL/L (ref 23–31)
CREAT SERPL-MCNC: 1.6 MG/DL (ref 0.55–1.02)
CREAT/UREA NIT SERPL: 15
GFR SERPLBLD CREATININE-BSD FMLA CKD-EPI: 34 ML/MIN/1.73/M2
GLUCOSE SERPL-MCNC: 152 MG/DL (ref 82–115)
POTASSIUM SERPL-SCNC: 4.6 MMOL/L (ref 3.5–5.1)
SODIUM SERPL-SCNC: 140 MMOL/L (ref 136–145)

## 2025-01-14 PROCEDURE — 20610 DRAIN/INJ JOINT/BURSA W/O US: CPT | Mod: PBBFAC

## 2025-01-14 PROCEDURE — 80048 BASIC METABOLIC PNL TOTAL CA: CPT

## 2025-01-14 PROCEDURE — 99215 OFFICE O/P EST HI 40 MIN: CPT | Mod: PBBFAC

## 2025-01-14 PROCEDURE — 36415 COLL VENOUS BLD VENIPUNCTURE: CPT

## 2025-01-14 RX ORDER — LIDOCAINE HYDROCHLORIDE 10 MG/ML
4 INJECTION, SOLUTION EPIDURAL; INFILTRATION; INTRACAUDAL; PERINEURAL
Status: COMPLETED | OUTPATIENT
Start: 2025-01-14 | End: 2025-01-14

## 2025-01-14 RX ORDER — TRIAMCINOLONE ACETONIDE 40 MG/ML
40 INJECTION, SUSPENSION INTRA-ARTICULAR; INTRAMUSCULAR
Status: COMPLETED | OUTPATIENT
Start: 2025-01-14 | End: 2025-01-14

## 2025-01-14 RX ADMIN — TRIAMCINOLONE ACETONIDE 40 MG: 40 INJECTION, SUSPENSION INTRA-ARTICULAR; INTRAMUSCULAR at 04:01

## 2025-01-14 RX ADMIN — LIDOCAINE HYDROCHLORIDE 40 MG: 10 INJECTION, SOLUTION EPIDURAL; INFILTRATION; INTRACAUDAL; PERINEURAL at 04:01

## 2025-01-14 NOTE — PROGRESS NOTES
Family Medicine Clinic Note     Subjective     Patient ID: Ivania Hatch is a 69 y.o. female.    Chief Complaint: Knee Pain (Right knee pain )    HPI    Hyperkalemia: unable to  [facility] Evaluated by nephrology and pt states she was instructed to continue all medication as direct. States she has been avoiding potassium rich foods like leafy greens, tomatoes, bananas, and potatoes. Reports she has held spironolactone. Denies chest pain, palpitations, myalgia    Hypertension: elevated today. Patient states 2/2 to pain vs not taking spironolactone as usual. Denies headache, chest pain, or shortness of breath    Right knee pain: last night woke up and couldn't walk. Throbbing. Medal knee pain. Worse with weight bearing. Patient has had prior knee problems. Evaluation to date: plain films, PCP evaluation, and Ortho evaluation. Treatment to date: bracing, topical analgesics, oral analgesics, and ice/heat. Last joint injection: 5/29/2024.  Expectations for today's visit includes CS injection.  Occupation includes retired private sitter.     Knee Review of Systems:  Swelling?  yes  Instability?  no  Mechanical sx?  no  <30 min AM stiffness? yes  Limited ROM? yes  Fever/Chills? no     Current Choice of Exercise:  Walking and Cycling        Comorbid conditions: type 2 Dm: last A1c was 6.9 in 10/24 and CKD -avoid NSAIDS    Current Outpatient Medications   Medication Instructions    amLODIPine (NORVASC) 10 mg, Oral, Daily    aspirin (ECOTRIN) 81 mg, Oral, Daily    blood sugar diagnostic Strp 1 each, Misc.(Non-Drug; Combo Route), 3 times daily    carvediloL (COREG) 25 mg, Oral, 2 times daily    cetirizine (ZYRTEC) 10 mg, Oral, Nightly    chlorthalidone (HYGROTEN) 25 mg, Oral    cholecalciferol (vitamin D3) 5,000 Units, Oral    clotrimazole (LOTRIMIN) 1 % cream Topical (Top), 2 times daily    diclofenac sodium (VOLTAREN ARTHRITIS PAIN) 2 g, Topical (Top), 4 times daily, Do  "not exceed 32 grams/day: do not to exceed 8 grams/day/single joint of upper extremities; do not to exceed 16 grams/day/single joint of lower extremities.  Please request refill of this medication from your PCP.    diclofenac sodium (VOLTAREN) 1 % Gel   See Instructions, APPLY 1 THIN LAYER TOPICALLY 4 TIMES DAILY AS NEEDED FOR PAIN /  NOT  TO  EXCEED  16GRAMS/DAY  SINGLE  JOINT  OF  LOWER  EXTREMITIES, # 300 gm, 1 Refill(s), Pharmacy: [facility], 175, cm, Height/Length Dosing, 21 10:4...    docusate sodium 100 mg capsule Stool Softener 100 mg tablet   Take 1 tablet every day by oral route.    ferrous sulfate (FEOSOL) 325 mg (65 mg iron) Tab tablet   See Instructions, Take 1 tablet by mouth once daily for 90 days, # 90 tab(s), 3 Refill(s), Pharmacy: [facility], 175, cm, Height/Length Dosing, 21 10:41:00 CDT, 126, kg, Weight Dosing, 21 10:41:00 CDT    fluticasone propionate (FLONASE) 50 mcg, Each Nostril, Daily PRN    furosemide (LASIX) 80 mg    gabapentin (NEURONTIN) 300 mg, Oral, 2 times daily    garlic 1,000 mg, Oral    hydrALAZINE (APRESOLINE) 100 mg, Oral, 3 times daily    isosorbide mononitrate (IMDUR) 60 mg, Oral    JARDIANCE 25 mg, Oral, Every morning    LANTUS SOLOSTAR U-100 INSULIN 100 unit/mL (3 mL) InPn pen SMARTSI Unit(s) SUB-Q Every Evening    LUMIGAN 0.01 % Drop 1 drop, Both Eyes, Nightly    metOLazone (ZAROXOLYN) 10 mg, Oral    MICROLET LANCET Mis USE 1 LANCET TO CHECK GLUCOSE THREE TIMES DAILY    MIRALAX 17 g, Oral    MOUNJARO 7.5 mg, Subcutaneous, Every 7 days    olmesartan (BENICAR) 40 mg, Oral    pantoprazole (PROTONIX) 40 mg, Oral, Daily    pen needle, diabetic 32 gauge x 5/32" Ndle USE AS DIRECTED ONCE DAILY    rosuvastatin (CRESTOR) 20 mg, Oral, Daily    sodium zirconium cyclosilicate (LOKELMA) 10 g, Oral, Daily, Mix entire contents of packet(s) into drinking glass containing 3 tablespoons of water; stir well and drink immediately. Add water and repeat " "until no powder remains to receive entire dose.    spironolactone (ALDACTONE) 25 mg, Oral    timolol maleate 0.5% (TIMOPTIC) 0.5 % Drop 1 drop, Both Eyes, 2 times daily       ROS   As per hpi    Objective     Vitals:    01/14/25 1516 01/14/25 1614   BP: (!) 167/77 (!) 155/71   Pulse: 75 78   Resp: 18    SpO2: 100%    Weight: 119.7 kg (264 lb)    Height: 5' 8" (1.727 m)         Physical Exam  Vitals reviewed.   Constitutional:       General: She is not in acute distress.  Eyes:      Pupils: Pupils are equal, round, and reactive to light.   Cardiovascular:      Rate and Rhythm: Normal rate and regular rhythm.      Pulses: Normal pulses.   Musculoskeletal:         General: No signs of injury.      Right lower leg: No edema.      Left lower leg: No edema.      Comments: Right knee crepitus, tenderness to palpation along medial and laterl joint line. No effusion notes. Swelling to right knee present. ROM limited to 140 degrees extension and 100 degrees flexion due to pain.   Skin:     General: Skin is warm and dry.      Capillary Refill: Capillary refill takes less than 2 seconds.      Comments: Vitiligo to bilateral legs and hands.   Neurological:      Mental Status: She is alert.        Joint Injection Procedure Note  PRE-OP DIAGNOSIS: Right knee OA  POST-OP DIAGNOSIS: Same   PROCEDURE: right knee joint injection later   Performing Physician: Juancho Ng DO  Supervising Physician (if applicable): Maday Bustamante MD    Dose:        X  1%        _  2%   Lidocaine      40  mL      X Steroid 40mg/mL Triamcinolone acetonide          Procedure: The area was prepped in the usual sterile manner. The needle  was inserted into the affected area and the steroid was injected.   There were no complications during this procedure.    Followup: The patient tolerated the procedure well without  complications.  Standard post-procedure care is explained and return  precautions are given.     Assessment and Plan    1. Primary " osteoarthritis of right knee (Primary)  Injection performed and tolerated well by pt  7/2024 x ray reviewed  Pt to follow up with ortho clinic for gel injections to knee once prior auth obtained  Continue tylenol and topical medication as needed for pain  Continue home stretches    2. Hyperkalemia  BMP ordered today and potassium corrected (4.6)  Will discuss with nephrology if pt should continue to hold or resume spironolactone. Previous potassium levels with spironolactone use were within normal  Continue to avoid food rich in potassium.  Will monitor at future visits.      3. Essential hypertension  Improved on recheck  Monitor at home and call clinic with logs  Will discuss with nephrology for further recommendations         Follow up in about 3 months (around 4/14/2025) for hypertension.      Juancho Ng DO  [facility] Family Medicine Resident, -II

## 2025-01-16 NOTE — PROGRESS NOTES
I reviewed History, PE, A/P and medical record.  Services provided in outpatient department of a teaching hospital/facility, I was immediately available.  I agree with resident, care reasonable and necessary with any exceptions stated below.  I evaluated the patient with resident at time of visit, participated in key parts of H/P and management was discussed.    Present for procedure    Colonoscopy 11/2023 had hyperplastic polyp., please review need for 5 yr FU as called for    Caution with Jardiance if GFR drops < 20      Maday Bustamante MD  [facility] Family Medicine Residency - [place]  [facility]

## 2025-01-17 ENCOUNTER — HOSPITAL ENCOUNTER (OUTPATIENT)
Dept: RADIOLOGY | Facility: HOSPITAL | Age: 70
Discharge: HOME | End: 2025-01-17
Payer: MEDICARE

## 2025-01-17 DIAGNOSIS — Z13.820 ENCOUNTER FOR OSTEOPOROSIS SCREENING IN ASYMPTOMATIC POSTMENOPAUSAL PATIENT: ICD-10-CM

## 2025-01-17 DIAGNOSIS — Z12.31 ENCOUNTER FOR SCREENING MAMMOGRAM FOR MALIGNANT NEOPLASM OF BREAST: ICD-10-CM

## 2025-01-17 DIAGNOSIS — Z78.0 ENCOUNTER FOR OSTEOPOROSIS SCREENING IN ASYMPTOMATIC POSTMENOPAUSAL PATIENT: ICD-10-CM

## 2025-01-17 PROCEDURE — 77067 SCR MAMMO BI INCL CAD: CPT | Mod: 26,,, | Performed by: RADIOLOGY

## 2025-01-17 PROCEDURE — 77063 BREAST TOMOSYNTHESIS BI: CPT | Mod: TC

## 2025-01-17 PROCEDURE — 77080 DXA BONE DENSITY AXIAL: CPT | Mod: TC

## 2025-01-17 PROCEDURE — 77063 BREAST TOMOSYNTHESIS BI: CPT | Mod: 26,,, | Performed by: RADIOLOGY

## 2025-02-03 DIAGNOSIS — E11.9 TYPE 2 DIABETES MELLITUS WITHOUT COMPLICATION, WITH LONG-TERM CURRENT USE OF INSULIN: ICD-10-CM

## 2025-02-03 DIAGNOSIS — Z79.4 TYPE 2 DIABETES MELLITUS WITHOUT COMPLICATION, WITH LONG-TERM CURRENT USE OF INSULIN: ICD-10-CM

## 2025-02-03 RX ORDER — INSULIN GLARGINE 100 [IU]/ML
55 INJECTION, SOLUTION SUBCUTANEOUS NIGHTLY
Qty: 15 ML | Refills: 5 | Status: SHIPPED | OUTPATIENT
Start: 2025-02-03

## 2025-02-03 RX ORDER — INSULIN GLARGINE 100 [IU]/ML
INJECTION, SOLUTION SUBCUTANEOUS
Qty: 15 ML | Refills: 5 | Status: SHIPPED | OUTPATIENT
Start: 2025-02-03 | End: 2025-02-03

## 2025-02-04 DIAGNOSIS — D64.9 NORMOCYTIC ANEMIA: ICD-10-CM

## 2025-02-04 DIAGNOSIS — K21.9 GASTROESOPHAGEAL REFLUX DISEASE, UNSPECIFIED WHETHER ESOPHAGITIS PRESENT: ICD-10-CM

## 2025-02-04 RX ORDER — OLMESARTAN MEDOXOMIL 40 MG/1
40 TABLET ORAL DAILY
Qty: 30 TABLET | Refills: 2 | Status: SHIPPED | OUTPATIENT
Start: 2025-02-04

## 2025-02-04 RX ORDER — DICLOFENAC SODIUM 10 MG/G
2 GEL TOPICAL 4 TIMES DAILY
Qty: 100 G | Refills: 3 | Status: SHIPPED | OUTPATIENT
Start: 2025-02-04

## 2025-02-04 RX ORDER — VIT C/E/ZN/COPPR/LUTEIN/ZEAXAN 250MG-90MG
5000 CAPSULE ORAL DAILY
Qty: 30 CAPSULE | Refills: 2 | Status: SHIPPED | OUTPATIENT
Start: 2025-02-04

## 2025-02-04 RX ORDER — HYDRALAZINE HYDROCHLORIDE 100 MG/1
100 TABLET, FILM COATED ORAL 3 TIMES DAILY
Qty: 90 TABLET | Refills: 2 | Status: SHIPPED | OUTPATIENT
Start: 2025-02-04

## 2025-02-04 RX ORDER — FERROUS SULFATE 325(65) MG
TABLET ORAL
Qty: 90 TABLET | Refills: 1 | Status: SHIPPED | OUTPATIENT
Start: 2025-02-04

## 2025-02-04 RX ORDER — METOLAZONE 10 MG/1
10 TABLET ORAL DAILY
Qty: 30 TABLET | Refills: 2 | Status: SHIPPED | OUTPATIENT
Start: 2025-02-04

## 2025-02-04 RX ORDER — FUROSEMIDE 80 MG/1
80 TABLET ORAL DAILY
Qty: 30 TABLET | Refills: 11 | Status: SHIPPED | OUTPATIENT
Start: 2025-02-04 | End: 2026-02-04

## 2025-02-04 RX ORDER — ASPIRIN 81 MG
100 TABLET, DELAYED RELEASE (ENTERIC COATED) ORAL 2 TIMES DAILY PRN
Qty: 60 TABLET | Refills: 2 | Status: SHIPPED | OUTPATIENT
Start: 2025-02-04

## 2025-02-04 RX ORDER — ISOSORBIDE MONONITRATE 60 MG/1
60 TABLET, EXTENDED RELEASE ORAL DAILY
Qty: 30 TABLET | Refills: 2 | Status: SHIPPED | OUTPATIENT
Start: 2025-02-04

## 2025-02-04 RX ORDER — SPIRONOLACTONE 25 MG/1
25 TABLET ORAL DAILY
Qty: 30 TABLET | Refills: 1 | Status: SHIPPED | OUTPATIENT
Start: 2025-02-04

## 2025-02-04 RX ORDER — CHLORTHALIDONE 25 MG/1
25 TABLET ORAL DAILY
Qty: 30 TABLET | Refills: 1 | Status: SHIPPED | OUTPATIENT
Start: 2025-02-04

## 2025-02-04 RX ORDER — CARVEDILOL 25 MG/1
25 TABLET ORAL 2 TIMES DAILY
Qty: 60 TABLET | Refills: 0 | Status: SHIPPED | OUTPATIENT
Start: 2025-02-04

## 2025-02-04 RX ORDER — AMLODIPINE BESYLATE 10 MG/1
10 TABLET ORAL DAILY
Qty: 30 TABLET | Refills: 1 | Status: SHIPPED | OUTPATIENT
Start: 2025-02-04

## 2025-02-04 RX ORDER — PANTOPRAZOLE SODIUM 40 MG/1
40 TABLET, DELAYED RELEASE ORAL DAILY
Qty: 90 TABLET | Refills: 1 | Status: SHIPPED | OUTPATIENT
Start: 2025-02-04

## 2025-02-10 ENCOUNTER — LAB VISIT (OUTPATIENT)
Dept: LAB | Facility: HOSPITAL | Age: 70
End: 2025-02-10
Attending: INTERNAL MEDICINE
Payer: MEDICARE

## 2025-02-10 DIAGNOSIS — N18.4 CHRONIC KIDNEY DISEASE, STAGE IV (SEVERE): Primary | ICD-10-CM

## 2025-02-10 DIAGNOSIS — E55.9 AVITAMINOSIS D: ICD-10-CM

## 2025-02-10 DIAGNOSIS — I12.9 PARENCHYMAL RENAL HYPERTENSION: ICD-10-CM

## 2025-02-10 DIAGNOSIS — E11.43 DIABETIC AUTONOMIC NEUROPATHY: ICD-10-CM

## 2025-02-10 DIAGNOSIS — E66.9 OBESITY, UNSPECIFIED CLASS, UNSPECIFIED OBESITY TYPE, UNSPECIFIED WHETHER SERIOUS COMORBIDITY PRESENT: ICD-10-CM

## 2025-02-10 LAB
25(OH)D3+25(OH)D2 SERPL-MCNC: 54 NG/ML (ref 30–80)
ALBUMIN SERPL-MCNC: 3.6 G/DL (ref 3.4–4.8)
ALBUMIN/GLOB SERPL: 1 RATIO (ref 1.1–2)
ALP SERPL-CCNC: 83 UNIT/L (ref 40–150)
ALT SERPL-CCNC: 13 UNIT/L (ref 0–55)
ANION GAP SERPL CALC-SCNC: 4 MEQ/L
AST SERPL-CCNC: 16 UNIT/L (ref 5–34)
BASOPHILS # BLD AUTO: 0.02 X10(3)/MCL
BASOPHILS NFR BLD AUTO: 0.3 %
BILIRUB SERPL-MCNC: 0.3 MG/DL
BUN SERPL-MCNC: 43.9 MG/DL (ref 9.8–20.1)
CALCIUM SERPL-MCNC: 9.5 MG/DL (ref 8.4–10.2)
CHLORIDE SERPL-SCNC: 112 MMOL/L (ref 98–107)
CO2 SERPL-SCNC: 25 MMOL/L (ref 23–31)
CREAT SERPL-MCNC: 1.88 MG/DL (ref 0.55–1.02)
CREAT/UREA NIT SERPL: 23
EOSINOPHIL # BLD AUTO: 0.35 X10(3)/MCL (ref 0–0.9)
EOSINOPHIL NFR BLD AUTO: 5.1 %
ERYTHROCYTE [DISTWIDTH] IN BLOOD BY AUTOMATED COUNT: 14.5 % (ref 11.5–17)
GFR SERPLBLD CREATININE-BSD FMLA CKD-EPI: 28 ML/MIN/1.73/M2
GLOBULIN SER-MCNC: 3.7 GM/DL (ref 2.4–3.5)
GLUCOSE SERPL-MCNC: 61 MG/DL (ref 82–115)
HCT VFR BLD AUTO: 37.7 % (ref 37–47)
HGB BLD-MCNC: 11.5 G/DL (ref 12–16)
IMM GRANULOCYTES # BLD AUTO: 0 X10(3)/MCL (ref 0–0.04)
IMM GRANULOCYTES NFR BLD AUTO: 0 %
LYMPHOCYTES # BLD AUTO: 1.46 X10(3)/MCL (ref 0.6–4.6)
LYMPHOCYTES NFR BLD AUTO: 21.2 %
MAGNESIUM SERPL-MCNC: 2.2 MG/DL (ref 1.6–2.6)
MCH RBC QN AUTO: 25.9 PG (ref 27–31)
MCHC RBC AUTO-ENTMCNC: 30.5 G/DL (ref 33–36)
MCV RBC AUTO: 84.9 FL (ref 80–94)
MONOCYTES # BLD AUTO: 0.66 X10(3)/MCL (ref 0.1–1.3)
MONOCYTES NFR BLD AUTO: 9.6 %
NEUTROPHILS # BLD AUTO: 4.41 X10(3)/MCL (ref 2.1–9.2)
NEUTROPHILS NFR BLD AUTO: 63.8 %
PHOSPHATE SERPL-MCNC: 3 MG/DL (ref 2.3–4.7)
PLATELET # BLD AUTO: 216 X10(3)/MCL (ref 130–400)
PMV BLD AUTO: 11.7 FL (ref 7.4–10.4)
POTASSIUM SERPL-SCNC: 5.1 MMOL/L (ref 3.5–5.1)
PROT SERPL-MCNC: 7.3 GM/DL (ref 5.8–7.6)
PTH-INTACT SERPL-MCNC: 338.1 PG/ML (ref 8.7–77)
RBC # BLD AUTO: 4.44 X10(6)/MCL (ref 4.2–5.4)
SODIUM SERPL-SCNC: 141 MMOL/L (ref 136–145)
WBC # BLD AUTO: 6.9 X10(3)/MCL (ref 4.5–11.5)

## 2025-02-10 PROCEDURE — 80053 COMPREHEN METABOLIC PANEL: CPT

## 2025-02-10 PROCEDURE — 84100 ASSAY OF PHOSPHORUS: CPT

## 2025-02-10 PROCEDURE — 83735 ASSAY OF MAGNESIUM: CPT

## 2025-02-10 PROCEDURE — 85025 COMPLETE CBC W/AUTO DIFF WBC: CPT

## 2025-02-10 PROCEDURE — 82306 VITAMIN D 25 HYDROXY: CPT

## 2025-02-10 PROCEDURE — 83970 ASSAY OF PARATHORMONE: CPT

## 2025-02-10 PROCEDURE — 36415 COLL VENOUS BLD VENIPUNCTURE: CPT

## 2025-02-11 ENCOUNTER — HOSPITAL ENCOUNTER (OUTPATIENT)
Dept: RADIOLOGY | Facility: HOSPITAL | Age: 70
Discharge: HOME | End: 2025-02-11
Payer: MEDICARE

## 2025-02-11 DIAGNOSIS — R92.8 ABNORMAL MAMMOGRAM: ICD-10-CM

## 2025-02-11 PROCEDURE — 77061 BREAST TOMOSYNTHESIS UNI: CPT | Mod: 26,RT,, | Performed by: RADIOLOGY

## 2025-02-11 PROCEDURE — 77061 BREAST TOMOSYNTHESIS UNI: CPT | Mod: TC,RT

## 2025-02-11 PROCEDURE — 77065 DX MAMMO INCL CAD UNI: CPT | Mod: 26,RT,, | Performed by: RADIOLOGY

## 2025-02-12 DIAGNOSIS — Z79.4 TYPE 2 DIABETES MELLITUS WITHOUT COMPLICATION, WITH LONG-TERM CURRENT USE OF INSULIN: ICD-10-CM

## 2025-02-12 DIAGNOSIS — E11.9 TYPE 2 DIABETES MELLITUS WITHOUT COMPLICATION, WITH LONG-TERM CURRENT USE OF INSULIN: ICD-10-CM

## 2025-03-05 ENCOUNTER — TELEPHONE (OUTPATIENT)
Dept: ORTHOPEDICS | Facility: CLINIC | Age: 70
End: 2025-03-05
Payer: MEDICARE

## 2025-03-05 NOTE — TELEPHONE ENCOUNTER
----- Message from Med Assistant Lerner sent at 1/16/2025  2:33 PM CST -----  Regarding: Injection  PA to be done in March    Please obtain prior authorization for viscosupplementation.  After obtaining, schedule a patient for procedure only appointment as appropriate with [facility]  (Euflexxa/Orthovisc/Hyalgan/Synvisc 1 visit per week for 3 weeks or Durolane 1 visit)     Laterality:Right Knee  Estimated Return to Clinic:   6 weeks (established visit)

## 2025-03-05 NOTE — TELEPHONE ENCOUNTER
Per Medicare A&B guidelines patient is approved for right knee Euflexxa. Please schedule patient with SM. Thanks.

## 2025-03-27 RX ORDER — TIRZEPATIDE 7.5 MG/.5ML
7.5 INJECTION, SOLUTION SUBCUTANEOUS
Qty: 2 ML | Refills: 2 | Status: SHIPPED | OUTPATIENT
Start: 2025-03-27

## 2025-03-28 RX ORDER — PEN NEEDLE, DIABETIC 30 GX3/16"
NEEDLE, DISPOSABLE MISCELLANEOUS
Qty: 90 EACH | Refills: 3 | Status: SHIPPED | OUTPATIENT
Start: 2025-03-28

## 2025-03-28 NOTE — TELEPHONE ENCOUNTER
Will send 4 week supply and 2 refills for a 12 week supply total.     Juancho Ng DO  [facility] Family Medicine Resident, -II

## 2025-04-01 ENCOUNTER — OFFICE VISIT (OUTPATIENT)
Dept: ORTHOPEDICS | Facility: CLINIC | Age: 70
End: 2025-04-01
Payer: MEDICARE

## 2025-04-01 VITALS
HEIGHT: 68 IN | HEART RATE: 73 BPM | BODY MASS INDEX: 40.09 KG/M2 | WEIGHT: 264.56 LBS | TEMPERATURE: 98 F | DIASTOLIC BLOOD PRESSURE: 78 MMHG | OXYGEN SATURATION: 98 % | SYSTOLIC BLOOD PRESSURE: 120 MMHG | RESPIRATION RATE: 18 BRPM

## 2025-04-01 DIAGNOSIS — M17.11 PRIMARY OSTEOARTHRITIS OF RIGHT KNEE: Primary | ICD-10-CM

## 2025-04-01 PROCEDURE — 99215 OFFICE O/P EST HI 40 MIN: CPT | Mod: PBBFAC

## 2025-04-01 PROCEDURE — 20610 DRAIN/INJ JOINT/BURSA W/O US: CPT | Mod: PBBFAC,RT

## 2025-04-01 RX ADMIN — Medication 20 MG: at 01:04

## 2025-04-01 NOTE — PROGRESS NOTES
"Subjective:    Patient ID: Ivania Hatch is a 69 y.o. female who presented to [facility] for follow up.    Chief Complaint: Injections of the Right Knee     History of Present Illness:  Ivania Hatch presents to the clinic for right knee pain that has been increasing over the last 3 weeks.  Patient had 10/10 pain prior to seeing her PCP, patient had a landmark based CSI with her PCP on 1/14/25 with improvement of her pain.  Her pain is currently an 8/10 she describes it as a aching constant pain.  She is unable to take NSAIDs due to her kidney function therefore she has been taking Tylenol with little relief.  She has been compliant with a home exercise program, f she has not attended formal PT due to financial limitations.  Inciting event: No pain.  Pain is graded by any weight-bearing, going up and down stairs, rising after sitting, and walking.  Evaluation to date: Plain films and PT evaluation.  Treatment to date:  Avoidance of activity, CSI (Right knee CSI 1/14/25 with PCP), home exercise, and ice/heat.  Expectations for today's visit:  Via RSI.  Occupation: Retired, CNA previously.  PCP is Dr. Ng.      Knee Review of Systems:  Swelling?  no  Instability?  no  Mechanical sx?  yes  <30 min AM stiffness? yes  Limited ROM? yes  Fever/Chills? no      Objective:     Physical Exam:  /78   Pulse 73   Temp 97.9 °F (36.6 °C) (Oral)   Resp 18   Ht 5' 8" (1.727 m)   Wt 120 kg (264 lb 8.8 oz)   SpO2 98%   BMI 40.22 kg/m²     Appearance:  Normal gait/station - FWB  Alignment: Left: normal Right: normal   Soft tissue swelling: Left: no Right: no  Effusion: Left:  Negative Right: Negative  Erythema: Left no Right: no  Ecchymosis: Left: no Right: no  Atrophy: Left: no Right: no    Palpation:  Knee Tenderness: Left: Medial joint line and Lateral joint line Right: None    Range of motion:  Flexion (140): Left:  60 Right: 40  Extension (0): Left: 0 Right: " 0    Strength:  Extension: Left 5/5  Pain: yes     Right 5/5 Pain: yes  Flexion: Left 5/5 Pain: yes Right   5/5 Pain: yes    Special Tests:  Ballotable Effusion:Left: Negative Right: Negative   Fluid Wave: Left: Negative Right: Negative   Crepitus: Left: Positive Right: Positive   Patellar grind test: Left: Negative  Right: Negative  Apprehension test: Left: Negative Right: Negative   Varus: @ 0, Left Negative Right: Positive.  @ 30, Left Negative  Right Positive   Valgus: @ 0, Left Negative Right: Positive.  @ 30, Left Negative  Right Positive  Lachman: Left: Negative Right: Negative   Ant Drawer: Left: Negative Right: Negative   Posterior Drawer: Left: Negative Right: Negative   Dial Test: Left: Not performed Right: Not performed   Bala: Left: Positive Right: Positive   Apley's: Left: Not performed Right: Not performed  Thessaly's: Left: Not performed Right: Not performed   Noble Compression: Left: Not performed Right: Not performed   Katheryn: Left: Not performed Right: Not performed     General appearance: NAD  Peripheral pulses: normal bilaterally   Reflexes: Left: normal Right normal   Sensation: normal    Labs:  Last A1c: 6.9     Imaging:   Previous images reviewed.  X-rays ordered and performed today: no  Right he may XR on 07/22/2024:  Bilateral medial joint narrowing with the patella joint or articular space degeneration, KL grade 3    Assessment:     Encounter Diagnoses   Code Name Primary?    M17.11 Primary osteoarthritis of right knee Yes       Plan:      Dx: right Knee Osteoarthritis -  Acute moderate exacerbation  Treatment Plan: Discussed with patient diagnosis and treatment recommendations. Recommend conservative treatment to include: avoidance of aggravating activity, significant modification of daily activities, hot/cold therapies, topical and oral medications, braces, HEP/PT/OT, and injections.   Imaging: prior radiological studies independently reviewed; discussed with patient; agree with  radiologist interpretation.   Weight Management: is paramount. Recommend to discuss with PCP about medication and bariatric surgery options for weight loss if your BMI is >35 and applicable. A BMI of <24.9 may provide further relief.  Procedure: Discussed injections as treatment options; discussed injections as treatment options; since conservative measures did not improve symptoms patient consented for starting VS series today.  Activity: Activity as tolerated; HEP to include aerobic conditioning and strength training with non-painful activity. ROM/STG exercises. Proper footware; assistive devises to avoid limping.   Therapy: HEP  Medication: CONTINUE over-the-counter acetaminophen (Tylenol 1000 mg three times per day as needed)  CONTINUE Voltaren Gel 1% as prescribed. Please see your primary care physician for further refills.  RTC: 1 week and 2 weeks for VS series; 6 months for re-evaluation.       Large Joint Aspiration/Injection: R knee    Date/Time: 4/1/2025 7:30 AM    Performed by: Susan Morejon MD  Authorized by: Susan Morejon MD    Consent Done?:  Yes (Written)  Indications:  Arthritis and pain  Timeout: prior to procedure the correct patient, procedure, and site was verified    Prep: patient was prepped and draped in usual sterile fashion      Local anesthesia used?: Yes    Local anesthetic:  Topical anesthetic    Details:  Needle Size:  21 G  Ultrasonic Guidance for needle placement?: No    Approach:  Superior  Location:  Knee  Site:  R knee  Patient tolerance:  Patient tolerated the procedure well with no immediate complications    Staff Attending: Kamila Costa MD    Risks:  Possible complications with the injection include bleeding, infection (.01%), tendon rupture, steroid flare, fat pad or soft tissue atrophy, skin depigmentation, allergic reaction to medications and vasovagal response. (steroid flare treatment is rest, ice, NSAIDs and resolves in 24-36 hours.)    Consent:  No absolute  contraindications (cellulitis overlying joint, infection, lack of informed consent, allergy to injection medication, AVN protein or egg allergy for sodium hyaluronate, or history of steroid flare) or relative contraindications (uncontrolled DM2 A1c>10, coagulopathy, INR > 3.5, previous joint replacement or history of AVN).        Description:  The patient was prepped in normal sterile fashion use of chlorhexidine scrub and the appropriate and anatomic landmarks were identified without image guidance. Care was taken to ensure there was unrestricted flow of syringe contents (listed below) into the site of injection.      Body mass index is 40.22 kg/m².    Contents of syringe included: 2mL of 10mg/ml of Euflexxa Injected.     Post Procedure: Patient alert, and moving all extremities. ROM improved, pain decreased.  Good peripheral pulses, no signs of vascular compromise and range of motion intact.  Aftercare instructions were given to patient at time of discharge.  Relative rest for 3 days-avoiding excess activity.  Place ice on the area for 15 minutes every 4-6 hours. Patient may take Tylenol a 1000 mg b.i.d. or ibuprofen 600 mg t.i.d. for the next 3-4 days if not on medication already and safe to take pending co-morbidities.  Protect the area for the next 1-8 hours if anesthetic was used.  Avoid excessive activity for the next 3-4 weeks.  ER precautions given for fever, severe joint pain or allergic reaction or other new symptoms related to the joint injection.         This note is dictated using the M*Modal Fluency Direct word recognition program. There are word recognition mistakes that are occasionally missed on review.     Susan Morejon MD  Sports Medicine Fellow

## 2025-04-08 ENCOUNTER — OFFICE VISIT (OUTPATIENT)
Dept: ORTHOPEDICS | Facility: CLINIC | Age: 70
End: 2025-04-08
Payer: MEDICARE

## 2025-04-08 VITALS
WEIGHT: 265.63 LBS | HEIGHT: 68 IN | SYSTOLIC BLOOD PRESSURE: 144 MMHG | HEART RATE: 87 BPM | DIASTOLIC BLOOD PRESSURE: 75 MMHG | BODY MASS INDEX: 40.26 KG/M2 | TEMPERATURE: 98 F

## 2025-04-08 DIAGNOSIS — M17.11 PRIMARY OSTEOARTHRITIS OF RIGHT KNEE: Primary | ICD-10-CM

## 2025-04-08 PROCEDURE — 99215 OFFICE O/P EST HI 40 MIN: CPT | Mod: PBBFAC

## 2025-04-08 PROCEDURE — 20610 DRAIN/INJ JOINT/BURSA W/O US: CPT | Mod: PBBFAC,RT

## 2025-04-08 NOTE — PROGRESS NOTES
Large Joint Aspiration/Injection: R knee (Euflexxa Series #2 of 3)    Date/Time: 4/8/2025 7:30 AM    Performed by: Susan Morejon MD  Authorized by: Susan Morejon MD    Consent Done?:  Yes (Written)  Indications:  Arthritis and pain  Timeout: prior to procedure the correct patient, procedure, and site was verified    Prep: patient was prepped and draped in usual sterile fashion      Local anesthesia used?: Yes    Local anesthetic:  Topical anesthetic    Details:  Needle Size:  21 G  Ultrasonic Guidance for needle placement?: No    Approach:  Anterior  Location:  Knee  Site:  R knee  Patient tolerance:  Patient tolerated the procedure well with no immediate complications      Staff Attending: Kamila Costa MD    Risks:  Possible complications with the injection include bleeding, infection (.01%), tendon rupture, steroid flare, fat pad or soft tissue atrophy, skin depigmentation, allergic reaction to medications and vasovagal response. (steroid flare treatment is rest, ice, NSAIDs and resolves in 24-36 hours.)    Consent:  No absolute contraindications (cellulitis overlying joint, infection, lack of informed consent, allergy to injection medication, AVN protein or egg allergy for sodium hyaluronate, or history of steroid flare) or relative contraindications (uncontrolled DM2 A1c>10, coagulopathy, INR > 3.5, previous joint replacement or history of AVN).        Description:  The patient was prepped in normal sterile fashion use of chlorhexidine scrub and the appropriate and anatomic landmarks were identified without image guidance. Care was taken to ensure there was unrestricted flow of syringe contents (listed below) into the site of injection.       Body mass index is 40.39 kg/m².    Contents of syringe included: 2mL of 10mg/ml of Euflexxa Injected.     Post Procedure: Patient alert, and moving all extremities. ROM improved, pain decreased.  Good peripheral pulses, no signs of vascular compromise and range of  motion intact.  Aftercare instructions were given to patient at time of discharge.  Relative rest for 3 days-avoiding excess activity.  Place ice on the area for 15 minutes every 4-6 hours. Patient may take Tylenol a 1000 mg b.i.d. or ibuprofen 600 mg t.i.d. for the next 3-4 days if not on medication already and safe to take pending co-morbidities.  Protect the area for the next 1-8 hours if anesthetic was used.  Avoid excessive activity for the next 3-4 weeks.  ER precautions given for fever, severe joint pain or allergic reaction or other new symptoms related to the joint injection.

## 2025-04-08 NOTE — PROGRESS NOTES
Faculty Attestation: Ivania Hatch  was seen in Sports Medicine Clinic. Services were furnished in a primary care sports medicine center in the outpatient department at a [facility] hospital. I participated in the management of the patient and was immediately available throughout the encounter. MEDICARE: I saw and evaluated the patient and was present for the key portions of the history and exam.  History of Present Illness, Physical Exam, and Assessment and Plan reviewed from prior encounter. Treatment plan is reasonable and appropriate. Compliance with treatment recommendations is important.  Radiology images independently reviewed and agree with radiologist interpretation. MEDICARE PROCEDURE: I was present for the key potions of the procedure.     Kamila Costa MD  Sports Medicine        Insurance: Medicare

## 2025-04-15 ENCOUNTER — OFFICE VISIT (OUTPATIENT)
Dept: ORTHOPEDICS | Facility: CLINIC | Age: 70
End: 2025-04-15
Payer: MEDICARE

## 2025-04-15 VITALS
SYSTOLIC BLOOD PRESSURE: 147 MMHG | BODY MASS INDEX: 40.26 KG/M2 | HEIGHT: 68 IN | WEIGHT: 265.63 LBS | DIASTOLIC BLOOD PRESSURE: 80 MMHG | HEART RATE: 77 BPM

## 2025-04-15 DIAGNOSIS — M17.11 PRIMARY OSTEOARTHRITIS OF RIGHT KNEE: Primary | ICD-10-CM

## 2025-04-15 PROCEDURE — 20610 DRAIN/INJ JOINT/BURSA W/O US: CPT | Mod: PBBFAC,RT

## 2025-04-15 PROCEDURE — 99214 OFFICE O/P EST MOD 30 MIN: CPT | Mod: PBBFAC

## 2025-04-15 RX ADMIN — Medication 20 MG: at 07:04

## 2025-04-15 NOTE — PROGRESS NOTES
Large Joint Aspiration/Injection: R knee (Euflexxa Series #3 of 3)    Date/Time: 4/15/2025 7:30 AM    Performed by: Susan Morejon MD  Authorized by: Susan Morejon MD    Consent Done?:  Yes (Written)  Indications:  Arthritis and pain  Timeout: prior to procedure the correct patient, procedure, and site was verified    Prep: patient was prepped and draped in usual sterile fashion      Local anesthesia used?: Yes    Local anesthetic:  Topical anesthetic    Details:  Needle Size:  21 G  Ultrasonic Guidance for needle placement?: No    Approach:  Anterolateral  Location:  Knee  Site:  R knee  Patient tolerance:  Patient tolerated the procedure well with no immediate complications      Staff Attending: Kamila Costa MD    Risks:  Possible complications with the injection include bleeding, infection (.01%), tendon rupture, steroid flare, fat pad or soft tissue atrophy, skin depigmentation, allergic reaction to medications and vasovagal response. (steroid flare treatment is rest, ice, NSAIDs and resolves in 24-36 hours.)    Consent:  No absolute contraindications (cellulitis overlying joint, infection, lack of informed consent, allergy to injection medication, AVN protein or egg allergy for sodium hyaluronate, or history of steroid flare) or relative contraindications (uncontrolled DM2 A1c>10, coagulopathy, INR > 3.5, previous joint replacement or history of AVN).        Description:  The patient was prepped in normal sterile fashion use of chlorhexidine scrub and the appropriate and anatomic landmarks were identified without image guidance. Care was taken to ensure there was unrestricted flow of syringe contents (listed below) into the site of injection.       Body mass index is 40.39 kg/m².    Contents of syringe included: 2mL of 10mg/ml of Euflexxa Injected.     Post Procedure: Patient alert, and moving all extremities. ROM improved, pain decreased.  Good peripheral pulses, no signs of vascular compromise and  range of motion intact.  Aftercare instructions were given to patient at time of discharge.  Relative rest for 3 days-avoiding excess activity.  Place ice on the area for 15 minutes every 4-6 hours. Patient may take Tylenol a 1000 mg b.i.d. or ibuprofen 600 mg t.i.d. for the next 3-4 days if not on medication already and safe to take pending co-morbidities.  Protect the area for the next 1-8 hours if anesthetic was used.  Avoid excessive activity for the next 3-4 weeks.  ER precautions given for fever, severe joint pain or allergic reaction or other new symptoms related to the joint injection.       This note is dictated using the M*Modal Fluency Direct word recognition program. There are word recognition mistakes that are occasionally missed on review.     Susan Morejon MD  Sports Medicine Fellow

## 2025-04-15 NOTE — PROGRESS NOTES
Faculty Attestation: Ivania Hatch  was seen in Sports Medicine Clinic. Services were furnished in a primary care sports medicine center in the outpatient department at a [facility] hospital. I participated in the management of the patient and was immediately available throughout the encounter. MEDICARE: I saw and evaluated the patient and was present for the key portions of the history and exam.  History of Present Illness, Physical Exam, and Assessment and Plan reviewed from prior encounter. Treatment plan is reasonable and appropriate. Compliance with treatment recommendations is important.  Radiology images independently reviewed and agree with radiologist interpretation. MEDICARE PROCEDURE: I was present for the key potions of the procedure.     Kamila Costa MD  Sports Medicine        Insurance: Medicare

## 2025-04-22 ENCOUNTER — OFFICE VISIT (OUTPATIENT)
Dept: FAMILY MEDICINE | Facility: CLINIC | Age: 70
End: 2025-04-22
Payer: MEDICARE

## 2025-04-22 VITALS
HEIGHT: 68 IN | RESPIRATION RATE: 20 BRPM | WEIGHT: 261 LBS | OXYGEN SATURATION: 99 % | HEART RATE: 74 BPM | DIASTOLIC BLOOD PRESSURE: 79 MMHG | BODY MASS INDEX: 39.56 KG/M2 | TEMPERATURE: 98 F | SYSTOLIC BLOOD PRESSURE: 135 MMHG

## 2025-04-22 DIAGNOSIS — Z79.4 TYPE 2 DIABETES MELLITUS WITHOUT COMPLICATION, WITH LONG-TERM CURRENT USE OF INSULIN: ICD-10-CM

## 2025-04-22 DIAGNOSIS — S49.91XA INJURY OF RIGHT SHOULDER, INITIAL ENCOUNTER: Primary | ICD-10-CM

## 2025-04-22 DIAGNOSIS — E11.9 TYPE 2 DIABETES MELLITUS WITHOUT COMPLICATION, WITH LONG-TERM CURRENT USE OF INSULIN: ICD-10-CM

## 2025-04-22 DIAGNOSIS — I11.9 HYPERTENSIVE HEART DISEASE WITHOUT CONGESTIVE HEART FAILURE: ICD-10-CM

## 2025-04-22 LAB — HBA1C MFR BLD: 6.3 %

## 2025-04-22 PROCEDURE — 99214 OFFICE O/P EST MOD 30 MIN: CPT | Mod: PBBFAC

## 2025-04-22 RX ORDER — SPIRONOLACTONE 25 MG/1
25 TABLET ORAL DAILY
Qty: 30 TABLET | Refills: 1 | Status: SHIPPED | OUTPATIENT
Start: 2025-04-22

## 2025-04-22 RX ORDER — TIRZEPATIDE 7.5 MG/.5ML
7.5 INJECTION, SOLUTION SUBCUTANEOUS
Qty: 2 ML | Refills: 2 | Status: SHIPPED | OUTPATIENT
Start: 2025-04-22

## 2025-04-22 RX ORDER — OLMESARTAN MEDOXOMIL 40 MG/1
40 TABLET ORAL DAILY
Qty: 30 EACH | Refills: 2 | Status: SHIPPED | OUTPATIENT
Start: 2025-04-22

## 2025-04-22 RX ORDER — DICLOFENAC SODIUM 10 MG/G
2 GEL TOPICAL 4 TIMES DAILY
Qty: 100 G | Refills: 3 | Status: SHIPPED | OUTPATIENT
Start: 2025-04-22

## 2025-04-22 RX ORDER — CARVEDILOL 25 MG/1
25 TABLET ORAL 2 TIMES DAILY
Qty: 60 TABLET | Refills: 0 | Status: SHIPPED | OUTPATIENT
Start: 2025-04-22

## 2025-04-22 RX ORDER — PANTOPRAZOLE SODIUM 40 MG/1
40 TABLET, DELAYED RELEASE ORAL DAILY
Qty: 90 TABLET | Refills: 1 | Status: SHIPPED | OUTPATIENT
Start: 2025-04-22

## 2025-04-22 RX ORDER — FLUTICASONE PROPIONATE 50 MCG
1 SPRAY, SUSPENSION (ML) NASAL DAILY PRN
Qty: 48 ML | Refills: 1 | Status: SHIPPED | OUTPATIENT
Start: 2025-04-22

## 2025-04-22 RX ORDER — GABAPENTIN 300 MG/1
300 CAPSULE ORAL 2 TIMES DAILY
Qty: 180 EACH | Refills: 1 | Status: SHIPPED | OUTPATIENT
Start: 2025-04-22

## 2025-04-22 RX ORDER — ASPIRIN 81 MG
100 TABLET, DELAYED RELEASE (ENTERIC COATED) ORAL 2 TIMES DAILY PRN
Qty: 60 TABLET | Refills: 2 | Status: SHIPPED | OUTPATIENT
Start: 2025-04-22

## 2025-04-22 RX ORDER — HYDRALAZINE HYDROCHLORIDE 100 MG/1
100 TABLET, FILM COATED ORAL 3 TIMES DAILY
Qty: 90 TABLET | Refills: 2 | Status: SHIPPED | OUTPATIENT
Start: 2025-04-22

## 2025-04-22 RX ORDER — METOLAZONE 10 MG/1
10 TABLET ORAL DAILY
Qty: 30 TABLET | Refills: 2 | Status: SHIPPED | OUTPATIENT
Start: 2025-04-22

## 2025-04-22 RX ORDER — PEN NEEDLE, DIABETIC 30 GX3/16"
NEEDLE, DISPOSABLE MISCELLANEOUS
Qty: 90 EACH | Refills: 3 | Status: SHIPPED | OUTPATIENT
Start: 2025-04-22

## 2025-04-22 RX ORDER — FERROUS SULFATE 325(65) MG
TABLET ORAL
Qty: 90 TABLET | Refills: 1 | Status: SHIPPED | OUTPATIENT
Start: 2025-04-22

## 2025-04-22 RX ORDER — AMLODIPINE BESYLATE 10 MG/1
10 TABLET ORAL DAILY
Qty: 30 EACH | Refills: 1 | Status: SHIPPED | OUTPATIENT
Start: 2025-04-22

## 2025-04-22 RX ORDER — INSULIN GLARGINE 100 [IU]/ML
55 INJECTION, SOLUTION SUBCUTANEOUS NIGHTLY
Qty: 15 ML | Refills: 5 | Status: SHIPPED | OUTPATIENT
Start: 2025-04-22

## 2025-04-22 RX ORDER — ROSUVASTATIN CALCIUM 20 MG/1
20 TABLET, COATED ORAL DAILY
Qty: 90 TABLET | Refills: 1 | Status: SHIPPED | OUTPATIENT
Start: 2025-04-22

## 2025-04-22 RX ORDER — EMPAGLIFLOZIN 25 MG/1
25 TABLET, FILM COATED ORAL EVERY MORNING
Qty: 90 TABLET | Refills: 1 | Status: SHIPPED | OUTPATIENT
Start: 2025-04-22

## 2025-04-22 RX ORDER — VIT C/E/ZN/COPPR/LUTEIN/ZEAXAN 250MG-90MG
5000 CAPSULE ORAL DAILY
Qty: 30 CAPSULE | Refills: 2 | Status: SHIPPED | OUTPATIENT
Start: 2025-04-22

## 2025-04-22 RX ORDER — CHLORTHALIDONE 25 MG/1
25 TABLET ORAL DAILY
Qty: 30 TABLET | Refills: 1 | Status: SHIPPED | OUTPATIENT
Start: 2025-04-22

## 2025-04-22 NOTE — PROGRESS NOTES
Family Medicine Clinic Note     Subjective     Patient ID: Ivania Hatch is a 69 y.o. female.    Chief Complaint: Follow-up (3 month follow up for diabetes) and Medication Refill    HPI  Fall  -reports ground level fall last week while shopping  - Hit pavement onto right arm and shoulder  -evaluated at [facility] urgent care with x-ray reportedly negative for fracture  -prescribed ketorolac but pt never picked up medication  -still having aching pain and reduced range of motion  -heating pad and rest provides some relieve    Diabetes Mellitus Type II, Follow-up:  Adherent to Trulicity 3 mg, Lantus 53 qhs  Discontinued Glimepiride 4mg daily because of CKD  States FBG 120s at home   -A1c 6.9 10/2024. Due.  -hypoglycemia episodes: none  Microalbumin/cr: 1/02/2025 wnl  DM Foot exam: due  DM Eye exam: Matt, done 10/2024    PMHx:  HTN - adherent to medication No chest pain, SOB, vision changes, HA's.   HLD- rosuvastatin 20mg daily, LDL 65  Chronic Gout: Prednisone 20mg x 5 days works for acute flares. 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   CKD stage IV  GERD- protonix 40  Normocytic anemia- on PO Fe.    Care Team:  Dr. Foster (nephrology)  CIS    Current Outpatient Medications   Medication Instructions    amLODIPine (NORVASC) 10 mg, Oral, Daily    aspirin (ECOTRIN) 81 mg, Oral, Daily    blood sugar diagnostic Strp 1 each, Misc.(Non-Drug; Combo Route), 3 times daily    carvediloL (COREG) 25 mg, Oral, 2 times daily    cetirizine (ZYRTEC) 10 mg, Nightly    chlorthalidone (HYGROTEN) 25 mg, Oral, Daily    cholecalciferol (vitamin D3) 5,000 Units, Oral, Daily    diclofenac sodium (VOLTAREN ARTHRITIS PAIN) 2 g, Topical (Top), 4 times daily, Do not exceed 32 grams/day: do not to exceed 8 grams/day/single joint of upper extremities; do not to exceed 16 grams/day/single joint of lower extremities.  Please request refill of this medication from your PCP.    docusate sodium 100 mg, Oral, 2 times daily PRN    ferrous sulfate (FEOSOL) 325 mg (65  "mg iron) Tab tablet   See Instructions, Take 1 tablet by mouth once daily for 90 days, # 90 tab(s), 3 Refill(s), Pharmacy: [id number], 175, cm, Height/Length Dosing, 21 10:41:00 CDT, 126, kg, Weight Dosing, 21 10:41:00 CDT    fluticasone propionate (FLONASE) 50 mcg, Each Nostril, Daily PRN    furosemide (LASIX) 80 mg, Oral, Daily    gabapentin (NEURONTIN) 300 mg, Oral, 2 times daily    garlic 1,000 mg    hydrALAZINE (APRESOLINE) 100 mg, Oral, 3 times daily    isosorbide mononitrate (IMDUR) 60 mg, Oral, Daily    JARDIANCE 25 mg, Oral, Every morning    LANTUS SOLOSTAR U-100 INSULIN 55 Units, Subcutaneous, Nightly, SMARTSI Unit(s) SUB-Q Every Evening    LUMIGAN 0.01 % Drop 1 drop, Nightly    metOLazone (ZAROXOLYN) 10 mg, Oral, Daily    MICROLET LANCET Misc USE 1 LANCET TO CHECK GLUCOSE THREE TIMES DAILY    MIRALAX 17 g    MOUNJARO 7.5 mg, Subcutaneous, Every 7 days    olmesartan (BENICAR) 40 mg, Oral, Daily    pantoprazole (PROTONIX) 40 mg, Oral, Daily    pen needle, diabetic 32 gauge x 5/32" Ndle USE AS DIRECTED ONCE DAILY    rosuvastatin (CRESTOR) 20 mg, Oral, Daily    spironolactone (ALDACTONE) 25 mg, Oral, Daily    timolol maleate 0.5% (TIMOPTIC) 0.5 % Drop 1 drop, 2 times daily     Review of Systems   Respiratory:  Negative for shortness of breath.    Cardiovascular:  Negative for chest pain and leg swelling.   Musculoskeletal:  Positive for arthralgias.        Objective     Vitals:    25 1539   BP: 135/79   Pulse: 74   Resp: 20   Temp: 97.5 °F (36.4 °C)   TempSrc: Oral   SpO2: 99%   Weight: 118.4 kg (261 lb)   Height: 5' 8" (1.727 m)        Physical Exam  Vitals reviewed.   Constitutional:       General: She is not in acute distress.  HENT:      Mouth/Throat:      Mouth: Mucous membranes are moist.   Eyes:      Conjunctiva/sclera: Conjunctivae normal.      Pupils: Pupils are equal, round, and reactive to light.   Cardiovascular:      Rate and Rhythm: Normal rate and regular rhythm. "      Pulses:           Dorsalis pedis pulses are 2+ on the right side and 2+ on the left side.        Posterior tibial pulses are 2+ on the right side and 2+ on the left side.      Heart sounds: Normal heart sounds. No murmur heard.  Pulmonary:      Effort: Pulmonary effort is normal. No respiratory distress.      Breath sounds: Normal breath sounds. No wheezing, rhonchi or rales.   Abdominal:      General: There is no distension.      Palpations: Abdomen is soft.      Tenderness: There is no abdominal tenderness.   Musculoskeletal:         General: No tenderness.      Right lower leg: No edema.      Left lower leg: No edema.      Right foot: Normal range of motion. No deformity or Charcot foot.      Left foot: Normal range of motion. No deformity or Charcot foot.      Comments: Right shoulder with tenderness to palpation at anterior joint space, reduced range of motion due to pain. Negative Neer and empty can tests, Positive Wolfe-Basilio   Feet:      Right foot:      Protective Sensation: 10 sites tested.  10 sites sensed.      Skin integrity: Skin integrity normal. No ulcer or blister.      Toenail Condition: Right toenails are normal.      Left foot:      Protective Sensation: 10 sites tested.  10 sites sensed.      Skin integrity: Skin integrity normal. No ulcer or blister.      Toenail Condition: Left toenails are normal.   Skin:     General: Skin is warm and dry.      Capillary Refill: Capillary refill takes less than 2 seconds.      Comments: Vitiligo noted to bilateral arms, legs, and abdomen, sparing face, finger tips and toes.   Neurological:      General: No focal deficit present.      Mental Status: She is alert.          Assessment and Plan    1. Injury of right shoulder, initial encounter  Improving with current management  AVOID NSAIDS due to CKD  Can use Aspercreme with lidocaine, do not use at the same time as heating pad due to potential for burns  Home exercises provided  Pt will need pt  evaluation for a Rolator     2. Type 2 diabetes mellitus without complication, with long-term current use of insulin  A1c 6.3 today  Continue current medication  Discussed signs of hypoglycemia    3. Hypertensive heart disease without congestive heart failure   Stable  Continue current management  Medication refills sent  Follow up in about 6 weeks (around 6/3/2025) for shoulder pain.    Health Maintenance    CRC screening: Colonoscopy 11/2023 had hyperplastic polyp.5 yr f/u. has FH colon cancer (father)   LDCT: quit 30 years ago. About 5 pack years of smoking.   Cervical Cancer screening: NIL HRHPV negative 6/2024  Mammogram (age 40-74 years every 2 years):Mammogram BI-RADS: 2 Benign   DEXA ( age 65+ years): left femoral neck osteopenic  Occupation:    Immunization History   Administered Date(s) Administered    COVID-19, MRNA, LN-S, PF (MODERNA FULL 0.5 ML DOSE) 02/08/2021, 03/08/2021, 09/07/2021    Influenza - Quadrivalent 10/21/2016, 10/28/2021    Influenza - Trivalent - Fluarix, Flulaval, Fluzone, Afluria - PF 10/20/2010, 10/25/2011, 10/08/2014, 12/21/2015, 10/29/2018, 11/08/2019    Influenza - Trivalent - Fluzone High Dose - PF (65 years and older) 10/25/2017    Pneumococcal Conjugate - 13 Valent 03/09/2016, 11/08/2019    Pneumococcal Polysaccharide - 23 Valent 10/28/2021    Tdap 05/28/2014, 03/08/2017    Zoster 10/11/2015    Zoster Recombinant 04/29/2019, 09/11/2019     Juancho Ng DO  [facility] Family Medicine Resident, [facility]

## 2025-05-27 ENCOUNTER — LAB VISIT (OUTPATIENT)
Dept: LAB | Facility: HOSPITAL | Age: 70
End: 2025-05-27
Attending: INTERNAL MEDICINE
Payer: MEDICARE

## 2025-05-27 DIAGNOSIS — I12.9 PARENCHYMAL RENAL HYPERTENSION: ICD-10-CM

## 2025-05-27 DIAGNOSIS — E74.39 ACQUIRED MONOSACCHARIDE MALABSORPTION: ICD-10-CM

## 2025-05-27 DIAGNOSIS — E66.9 OBESITY, UNSPECIFIED CLASS, UNSPECIFIED OBESITY TYPE, UNSPECIFIED WHETHER SERIOUS COMORBIDITY PRESENT: ICD-10-CM

## 2025-05-27 DIAGNOSIS — E78.5 HYPERLIPIDEMIA, UNSPECIFIED HYPERLIPIDEMIA TYPE: ICD-10-CM

## 2025-05-27 DIAGNOSIS — N18.4 CHRONIC KIDNEY DISEASE, STAGE IV (SEVERE): Primary | ICD-10-CM

## 2025-05-27 DIAGNOSIS — E11.43 DIABETIC AUTONOMIC NEUROPATHY: ICD-10-CM

## 2025-05-27 DIAGNOSIS — E55.9 AVITAMINOSIS D: ICD-10-CM

## 2025-05-27 LAB
25(OH)D3+25(OH)D2 SERPL-MCNC: 60 NG/ML (ref 30–80)
ALBUMIN SERPL-MCNC: 3.6 G/DL (ref 3.4–4.8)
ALBUMIN/GLOB SERPL: 1.2 RATIO (ref 1.1–2)
ALP SERPL-CCNC: 82 UNIT/L (ref 40–150)
ALT SERPL-CCNC: 14 UNIT/L (ref 0–55)
ANION GAP SERPL CALC-SCNC: 8 MEQ/L
AST SERPL-CCNC: 13 UNIT/L (ref 11–45)
BASOPHILS # BLD AUTO: 0.02 X10(3)/MCL
BASOPHILS NFR BLD AUTO: 0.3 %
BILIRUB SERPL-MCNC: 0.4 MG/DL
BUN SERPL-MCNC: 30.1 MG/DL (ref 9.8–20.1)
CALCIUM SERPL-MCNC: 9.7 MG/DL (ref 8.4–10.2)
CHLORIDE SERPL-SCNC: 112 MMOL/L (ref 98–107)
CO2 SERPL-SCNC: 22 MMOL/L (ref 23–31)
CREAT SERPL-MCNC: 1.77 MG/DL (ref 0.55–1.02)
CREAT/UREA NIT SERPL: 17
EOSINOPHIL # BLD AUTO: 0.35 X10(3)/MCL (ref 0–0.9)
EOSINOPHIL NFR BLD AUTO: 5 %
ERYTHROCYTE [DISTWIDTH] IN BLOOD BY AUTOMATED COUNT: 14.4 % (ref 11.5–17)
GFR SERPLBLD CREATININE-BSD FMLA CKD-EPI: 30 ML/MIN/1.73/M2
GLOBULIN SER-MCNC: 3.1 GM/DL (ref 2.4–3.5)
GLUCOSE SERPL-MCNC: 135 MG/DL (ref 82–115)
HCT VFR BLD AUTO: 35.5 % (ref 37–47)
HGB BLD-MCNC: 11.5 G/DL (ref 12–16)
IMM GRANULOCYTES # BLD AUTO: 0.01 X10(3)/MCL (ref 0–0.04)
IMM GRANULOCYTES NFR BLD AUTO: 0.1 %
LYMPHOCYTES # BLD AUTO: 1.73 X10(3)/MCL (ref 0.6–4.6)
LYMPHOCYTES NFR BLD AUTO: 24.7 %
MAGNESIUM SERPL-MCNC: 2 MG/DL (ref 1.6–2.6)
MCH RBC QN AUTO: 27.3 PG (ref 27–31)
MCHC RBC AUTO-ENTMCNC: 32.4 G/DL (ref 33–36)
MCV RBC AUTO: 84.1 FL (ref 80–94)
MONOCYTES # BLD AUTO: 0.47 X10(3)/MCL (ref 0.1–1.3)
MONOCYTES NFR BLD AUTO: 6.7 %
NEUTROPHILS # BLD AUTO: 4.42 X10(3)/MCL (ref 2.1–9.2)
NEUTROPHILS NFR BLD AUTO: 63.2 %
PHOSPHATE SERPL-MCNC: 3.4 MG/DL (ref 2.3–4.7)
PLATELET # BLD AUTO: 241 X10(3)/MCL (ref 130–400)
PMV BLD AUTO: 11.9 FL (ref 7.4–10.4)
POTASSIUM SERPL-SCNC: 5.2 MMOL/L (ref 3.5–5.1)
PROT SERPL-MCNC: 6.7 GM/DL (ref 5.8–7.6)
PTH-INTACT SERPL-MCNC: 264.6 PG/ML (ref 8.7–77)
RBC # BLD AUTO: 4.22 X10(6)/MCL (ref 4.2–5.4)
SODIUM SERPL-SCNC: 142 MMOL/L (ref 136–145)
WBC # BLD AUTO: 7 X10(3)/MCL (ref 4.5–11.5)

## 2025-05-27 PROCEDURE — 83735 ASSAY OF MAGNESIUM: CPT

## 2025-05-27 PROCEDURE — 85025 COMPLETE CBC W/AUTO DIFF WBC: CPT

## 2025-05-27 PROCEDURE — 83970 ASSAY OF PARATHORMONE: CPT

## 2025-05-27 PROCEDURE — 84100 ASSAY OF PHOSPHORUS: CPT

## 2025-05-27 PROCEDURE — 82306 VITAMIN D 25 HYDROXY: CPT

## 2025-05-27 PROCEDURE — 80053 COMPREHEN METABOLIC PANEL: CPT

## 2025-05-27 PROCEDURE — 36415 COLL VENOUS BLD VENIPUNCTURE: CPT

## 2025-06-03 ENCOUNTER — OFFICE VISIT (OUTPATIENT)
Dept: FAMILY MEDICINE | Facility: CLINIC | Age: 70
End: 2025-06-03
Payer: MEDICARE

## 2025-06-03 VITALS
HEIGHT: 68 IN | DIASTOLIC BLOOD PRESSURE: 73 MMHG | SYSTOLIC BLOOD PRESSURE: 138 MMHG | BODY MASS INDEX: 38.95 KG/M2 | HEART RATE: 80 BPM | OXYGEN SATURATION: 98 % | RESPIRATION RATE: 18 BRPM | TEMPERATURE: 98 F | WEIGHT: 257 LBS

## 2025-06-03 DIAGNOSIS — S49.91XD INJURY OF RIGHT SHOULDER, SUBSEQUENT ENCOUNTER: Primary | ICD-10-CM

## 2025-06-03 PROCEDURE — 99215 OFFICE O/P EST HI 40 MIN: CPT | Mod: PBBFAC

## 2025-06-03 RX ORDER — GABAPENTIN 300 MG/1
300 CAPSULE ORAL NIGHTLY
Start: 2025-06-03

## 2025-07-21 DIAGNOSIS — Z79.4 TYPE 2 DIABETES MELLITUS WITHOUT COMPLICATION, WITH LONG-TERM CURRENT USE OF INSULIN: ICD-10-CM

## 2025-07-21 DIAGNOSIS — E11.9 TYPE 2 DIABETES MELLITUS WITHOUT COMPLICATION, WITH LONG-TERM CURRENT USE OF INSULIN: ICD-10-CM

## 2025-07-21 RX ORDER — AMLODIPINE BESYLATE 10 MG/1
10 TABLET ORAL DAILY
Qty: 90 TABLET | Refills: 0 | Status: SHIPPED | OUTPATIENT
Start: 2025-07-21

## 2025-07-22 DIAGNOSIS — E11.9 TYPE 2 DIABETES MELLITUS WITHOUT COMPLICATION, WITH LONG-TERM CURRENT USE OF INSULIN: ICD-10-CM

## 2025-07-22 DIAGNOSIS — Z79.4 TYPE 2 DIABETES MELLITUS WITHOUT COMPLICATION, WITH LONG-TERM CURRENT USE OF INSULIN: ICD-10-CM

## 2025-07-22 RX ORDER — VIT C/E/ZN/COPPR/LUTEIN/ZEAXAN 250MG-90MG
5000 CAPSULE ORAL DAILY
Qty: 30 CAPSULE | Refills: 2 | Status: SHIPPED | OUTPATIENT
Start: 2025-07-22

## 2025-07-22 RX ORDER — TIRZEPATIDE 7.5 MG/.5ML
7.5 INJECTION, SOLUTION SUBCUTANEOUS
Qty: 2 ML | Refills: 2 | Status: SHIPPED | OUTPATIENT
Start: 2025-07-22

## 2025-08-25 DIAGNOSIS — Z79.4 TYPE 2 DIABETES MELLITUS WITHOUT COMPLICATION, WITH LONG-TERM CURRENT USE OF INSULIN: ICD-10-CM

## 2025-08-25 DIAGNOSIS — E11.9 TYPE 2 DIABETES MELLITUS WITHOUT COMPLICATION, WITH LONG-TERM CURRENT USE OF INSULIN: ICD-10-CM

## 2025-08-25 RX ORDER — INSULIN GLARGINE 100 [IU]/ML
55 INJECTION, SOLUTION SUBCUTANEOUS NIGHTLY
Qty: 15 ML | Refills: 5 | Status: SHIPPED | OUTPATIENT
Start: 2025-08-25

## 2025-08-27 ENCOUNTER — LAB VISIT (OUTPATIENT)
Dept: LAB | Facility: HOSPITAL | Age: 70
End: 2025-08-27
Attending: INTERNAL MEDICINE
Payer: MEDICARE

## 2025-08-27 DIAGNOSIS — I12.9 PARENCHYMAL RENAL HYPERTENSION: ICD-10-CM

## 2025-08-27 DIAGNOSIS — N18.32 CHRONIC KIDNEY DISEASE (CKD) STAGE G3B/A1, MODERATELY DECREASED GLOMERULAR FILTRATION RATE (GFR) BETWEEN 30-44 ML/MIN/1.73 SQUARE METER AND ALBUMINURIA CREATININE RATIO LESS THAN 30 MG/G: Primary | ICD-10-CM

## 2025-08-27 DIAGNOSIS — L80 VITILIGO: ICD-10-CM

## 2025-08-27 DIAGNOSIS — M15.8 OTHER POLYOSTEOARTHRITIS: ICD-10-CM

## 2025-08-27 DIAGNOSIS — E78.79: ICD-10-CM

## 2025-08-27 DIAGNOSIS — E55.9 AVITAMINOSIS D: ICD-10-CM

## 2025-08-27 DIAGNOSIS — E66.9 OBESITY, UNSPECIFIED CLASS, UNSPECIFIED OBESITY TYPE, UNSPECIFIED WHETHER SERIOUS COMORBIDITY PRESENT: ICD-10-CM

## 2025-08-27 LAB
25(OH)D3+25(OH)D2 SERPL-MCNC: 56 NG/ML (ref 30–80)
ALBUMIN SERPL-MCNC: 3.5 G/DL (ref 3.4–4.8)
ALBUMIN/GLOB SERPL: 1 RATIO (ref 1.1–2)
ALP SERPL-CCNC: 77 UNIT/L (ref 40–150)
ALT SERPL-CCNC: 19 UNIT/L (ref 0–55)
ANION GAP SERPL CALC-SCNC: 6 MEQ/L
AST SERPL-CCNC: 16 UNIT/L (ref 11–45)
BASOPHILS # BLD AUTO: 0.02 X10(3)/MCL
BASOPHILS NFR BLD AUTO: 0.3 %
BILIRUB SERPL-MCNC: 0.3 MG/DL
BUN SERPL-MCNC: 40.8 MG/DL (ref 9.8–20.1)
CALCIUM SERPL-MCNC: 9.5 MG/DL (ref 8.4–10.2)
CHLORIDE SERPL-SCNC: 112 MMOL/L (ref 98–107)
CO2 SERPL-SCNC: 22 MMOL/L (ref 23–31)
CREAT SERPL-MCNC: 2.19 MG/DL (ref 0.55–1.02)
CREAT/UREA NIT SERPL: 19
EOSINOPHIL # BLD AUTO: 0.37 X10(3)/MCL (ref 0–0.9)
EOSINOPHIL NFR BLD AUTO: 4.9 %
ERYTHROCYTE [DISTWIDTH] IN BLOOD BY AUTOMATED COUNT: 14.4 % (ref 11.5–17)
GFR SERPLBLD CREATININE-BSD FMLA CKD-EPI: 24 ML/MIN/1.73/M2
GLOBULIN SER-MCNC: 3.4 GM/DL (ref 2.4–3.5)
GLUCOSE SERPL-MCNC: 117 MG/DL (ref 82–115)
HCT VFR BLD AUTO: 36.1 % (ref 37–47)
HGB BLD-MCNC: 11.1 G/DL (ref 12–16)
IMM GRANULOCYTES # BLD AUTO: 0.01 X10(3)/MCL (ref 0–0.04)
IMM GRANULOCYTES NFR BLD AUTO: 0.1 %
LYMPHOCYTES # BLD AUTO: 2 X10(3)/MCL (ref 0.6–4.6)
LYMPHOCYTES NFR BLD AUTO: 26.5 %
MAGNESIUM SERPL-MCNC: 2.1 MG/DL (ref 1.6–2.6)
MCH RBC QN AUTO: 26 PG (ref 27–31)
MCHC RBC AUTO-ENTMCNC: 30.7 G/DL (ref 33–36)
MCV RBC AUTO: 84.5 FL (ref 80–94)
MONOCYTES # BLD AUTO: 0.54 X10(3)/MCL (ref 0.1–1.3)
MONOCYTES NFR BLD AUTO: 7.2 %
NEUTROPHILS # BLD AUTO: 4.61 X10(3)/MCL (ref 2.1–9.2)
NEUTROPHILS NFR BLD AUTO: 61 %
PHOSPHATE SERPL-MCNC: 3.1 MG/DL (ref 2.3–4.7)
PLATELET # BLD AUTO: 231 X10(3)/MCL (ref 130–400)
PMV BLD AUTO: 11.7 FL (ref 7.4–10.4)
POTASSIUM SERPL-SCNC: 5.1 MMOL/L (ref 3.5–5.1)
PROT SERPL-MCNC: 6.9 GM/DL (ref 5.8–7.6)
PTH-INTACT SERPL-MCNC: 308.5 PG/ML (ref 8.7–77)
RBC # BLD AUTO: 4.27 X10(6)/MCL (ref 4.2–5.4)
SODIUM SERPL-SCNC: 140 MMOL/L (ref 136–145)
WBC # BLD AUTO: 7.55 X10(3)/MCL (ref 4.5–11.5)

## 2025-08-27 PROCEDURE — 80053 COMPREHEN METABOLIC PANEL: CPT

## 2025-08-27 PROCEDURE — 84100 ASSAY OF PHOSPHORUS: CPT

## 2025-08-27 PROCEDURE — 83970 ASSAY OF PARATHORMONE: CPT

## 2025-08-27 PROCEDURE — 83735 ASSAY OF MAGNESIUM: CPT

## 2025-08-27 PROCEDURE — 82306 VITAMIN D 25 HYDROXY: CPT

## 2025-08-27 PROCEDURE — 85025 COMPLETE CBC W/AUTO DIFF WBC: CPT

## 2025-08-27 PROCEDURE — 36415 COLL VENOUS BLD VENIPUNCTURE: CPT

## 2025-09-02 ENCOUNTER — OFFICE VISIT (OUTPATIENT)
Dept: FAMILY MEDICINE | Facility: CLINIC | Age: 70
End: 2025-09-02
Payer: MEDICARE

## 2025-09-02 VITALS
TEMPERATURE: 98 F | WEIGHT: 261.63 LBS | DIASTOLIC BLOOD PRESSURE: 71 MMHG | BODY MASS INDEX: 39.65 KG/M2 | HEART RATE: 78 BPM | OXYGEN SATURATION: 98 % | HEIGHT: 68 IN | SYSTOLIC BLOOD PRESSURE: 127 MMHG | RESPIRATION RATE: 18 BRPM

## 2025-09-02 DIAGNOSIS — N18.4 TYPE 2 DIABETES MELLITUS WITH STAGE 4 CHRONIC KIDNEY DISEASE, WITH LONG-TERM CURRENT USE OF INSULIN: Primary | ICD-10-CM

## 2025-09-02 DIAGNOSIS — E66.812 CLASS 2 SEVERE OBESITY DUE TO EXCESS CALORIES WITH SERIOUS COMORBIDITY AND BODY MASS INDEX (BMI) OF 39.0 TO 39.9 IN ADULT: ICD-10-CM

## 2025-09-02 DIAGNOSIS — E11.22 TYPE 2 DIABETES MELLITUS WITH STAGE 4 CHRONIC KIDNEY DISEASE, WITH LONG-TERM CURRENT USE OF INSULIN: Primary | ICD-10-CM

## 2025-09-02 DIAGNOSIS — Z79.4 TYPE 2 DIABETES MELLITUS WITH STAGE 4 CHRONIC KIDNEY DISEASE, WITH LONG-TERM CURRENT USE OF INSULIN: Primary | ICD-10-CM

## 2025-09-02 DIAGNOSIS — I10 ESSENTIAL HYPERTENSION: ICD-10-CM

## 2025-09-02 DIAGNOSIS — E66.01 CLASS 2 SEVERE OBESITY DUE TO EXCESS CALORIES WITH SERIOUS COMORBIDITY AND BODY MASS INDEX (BMI) OF 39.0 TO 39.9 IN ADULT: ICD-10-CM

## 2025-09-02 PROCEDURE — 99215 OFFICE O/P EST HI 40 MIN: CPT | Mod: PBBFAC

## 2025-09-02 RX ORDER — OLMESARTAN MEDOXOMIL 40 MG/1
40 TABLET ORAL DAILY
Qty: 30 TABLET | Refills: 5 | Status: SHIPPED | OUTPATIENT
Start: 2025-09-02

## 2025-09-02 RX ORDER — CARVEDILOL 25 MG/1
25 TABLET ORAL 2 TIMES DAILY
Qty: 60 TABLET | Refills: 5 | Status: SHIPPED | OUTPATIENT
Start: 2025-09-02

## 2025-09-02 RX ORDER — ISOSORBIDE MONONITRATE 60 MG/1
60 TABLET, EXTENDED RELEASE ORAL DAILY
Qty: 30 TABLET | Refills: 5 | Status: SHIPPED | OUTPATIENT
Start: 2025-09-02

## 2025-09-02 RX ORDER — HYDRALAZINE HYDROCHLORIDE 100 MG/1
100 TABLET, FILM COATED ORAL 3 TIMES DAILY
Qty: 90 TABLET | Refills: 5 | Status: SHIPPED | OUTPATIENT
Start: 2025-09-02

## 2025-09-02 RX ORDER — EMPAGLIFLOZIN 25 MG/1
25 TABLET, FILM COATED ORAL EVERY MORNING
Qty: 90 TABLET | Refills: 1 | Status: SHIPPED | OUTPATIENT
Start: 2025-09-02

## 2025-09-02 RX ORDER — TIRZEPATIDE 10 MG/.5ML
10 INJECTION, SOLUTION SUBCUTANEOUS
Qty: 2 ML | Refills: 2 | Status: SHIPPED | OUTPATIENT
Start: 2025-09-02

## 2025-09-02 RX ORDER — ROSUVASTATIN CALCIUM 20 MG/1
20 TABLET, COATED ORAL DAILY
Qty: 90 TABLET | Refills: 1 | Status: SHIPPED | OUTPATIENT
Start: 2025-09-02

## 2025-09-02 RX ORDER — PANTOPRAZOLE SODIUM 40 MG/1
40 TABLET, DELAYED RELEASE ORAL DAILY
Qty: 90 TABLET | Refills: 1 | Status: SHIPPED | OUTPATIENT
Start: 2025-09-02